# Patient Record
Sex: MALE | Race: WHITE | NOT HISPANIC OR LATINO | Employment: OTHER | URBAN - METROPOLITAN AREA
[De-identification: names, ages, dates, MRNs, and addresses within clinical notes are randomized per-mention and may not be internally consistent; named-entity substitution may affect disease eponyms.]

---

## 2018-05-14 ENCOUNTER — OFFICE VISIT (OUTPATIENT)
Dept: FAMILY MEDICINE | Facility: HOSPITAL | Age: 63
End: 2018-05-14
Attending: FAMILY MEDICINE
Payer: MEDICARE

## 2018-05-14 VITALS
BODY MASS INDEX: 27.34 KG/M2 | HEIGHT: 71 IN | WEIGHT: 195.31 LBS | HEART RATE: 80 BPM | DIASTOLIC BLOOD PRESSURE: 67 MMHG | SYSTOLIC BLOOD PRESSURE: 107 MMHG

## 2018-05-14 DIAGNOSIS — G47.33 OSA (OBSTRUCTIVE SLEEP APNEA): ICD-10-CM

## 2018-05-14 DIAGNOSIS — F32.A DEPRESSION, UNSPECIFIED DEPRESSION TYPE: Primary | Chronic | ICD-10-CM

## 2018-05-14 DIAGNOSIS — Z00.00 HEALTHCARE MAINTENANCE: ICD-10-CM

## 2018-05-14 PROCEDURE — 99214 OFFICE O/P EST MOD 30 MIN: CPT

## 2018-05-14 NOTE — PROGRESS NOTES
Subjective:       Patient ID: Nacho Gusman is a 62 y.o. male.    Chief Complaint: Snoring    HPI   61 yo M hx of HTN, depression, bipolar comes in for snoring. Reports seeing on TV that snoring can cause CVA and MI. Patient became concerned an came in for evaluation. Per charts, [facility]. Patient reports he snores by his mother, with episodes of stopping occasionally. Patient wants to have a sleep study for further evaluation. Also c/o having depression in the past and stopped taking lexapro on his own. He was scheduled to see Dr. Louise however never saw her. Would like to see our psychologist here in clinic.    Review of Systems   Constitutional: Negative for chills, fatigue, fever and unexpected weight change.   HENT: Negative for sore throat.    Eyes: Negative for visual disturbance.   Respiratory: Negative for cough, chest tightness and shortness of breath.    Cardiovascular: Negative for chest pain.   Gastrointestinal: Negative for abdominal pain, constipation, diarrhea, nausea and vomiting.   Endocrine: Negative for polyuria.   Genitourinary: Negative for dysuria and hematuria.   Neurological: Negative for headaches.       Objective:      Vitals:    05/14/18 1609   BP: 107/67   Pulse: 80     Physical Exam   Constitutional: He is oriented to person, place, and time. He appears well-developed and well-nourished. No distress.   HENT:   Head: Normocephalic and atraumatic.   Right Ear: External ear normal.   Left Ear: External ear normal.   Eyes: Conjunctivae and EOM are normal. Right eye exhibits no discharge. Left eye exhibits no discharge. No scleral icterus.   Neck: Normal range of motion.   Cardiovascular: Normal rate, regular rhythm and normal heart sounds.  Exam reveals no gallop and no friction rub.    No murmur heard.  Pulmonary/Chest: Effort normal and breath sounds normal. No respiratory distress.  He has no wheezes. He has no rales. He exhibits no tenderness.   Musculoskeletal: He exhibits no edema or tenderness.   Neurological: He is alert and oriented to person, place, and time.   Skin: Skin is warm. He is not diaphoretic.   Psychiatric: He has a normal mood and affect. His behavior is normal. Judgment and thought content normal.   Nursing note and vitals reviewed.      Assessment:       1. Depression, unspecified depression type    2. INDIA (obstructive sleep apnea)    3. Healthcare maintenance        Plan:       Depression, unspecified depression type  -     Ambulatory referral to Psychology    INDIA (obstructive sleep apnea)  -     Ambulatory consult to Sleep Disorders    Healthcare maintenance  -     CBC auto differential; Future; Expected date: 05/14/2018  -     Comprehensive metabolic panel; Future; Expected date: 05/14/2018  -     Vitamin D; Future; Expected date: 05/14/2018  -     Lipid panel; Future; Expected date: 05/14/2018  -     Urinalysis; Future; Expected date: 05/14/2018  -     TSH; Future; Expected date: 05/14/2018  -     HIV-1 and HIV-2 antibodies; Future; Expected date: 05/14/2018  -     RPR; Future; Expected date: 05/14/2018    - STOP BANG - high risk. Referral to Dr. Lundberg  - PHQ9 = 15. Denies SI/HI. GAD7 = 3. Does not want pharm tx at this point. Desires to see Dr. Bolanos for depression. [facility]  - Denies hx of seizures  - Hx of HTN previously on lisinopril however stopped on his own. BP controlled off all medications.  - [facility]  - Will need to discuss low dose chest CT, AAA screening, and colonoscopy next visit    Follow-up in about 1 month (around 6/14/2018), or if symptoms worsen or fail to improve.

## 2018-05-15 NOTE — PROGRESS NOTES
Case discussed with resident at time of visit.  Patient seen and evaluated by me.  I have reviewed and concur with the resident's history, physical exam, assessment, and plan.

## 2018-05-31 ENCOUNTER — OFFICE VISIT (OUTPATIENT)
Dept: FAMILY MEDICINE | Facility: HOSPITAL | Age: 63
End: 2018-05-31
Attending: FAMILY MEDICINE
Payer: MEDICARE

## 2018-05-31 DIAGNOSIS — F32.A DEPRESSION, UNSPECIFIED DEPRESSION TYPE: Chronic | ICD-10-CM

## 2018-05-31 PROCEDURE — 99211 OFF/OP EST MAY X REQ PHY/QHP: CPT | Performed by: PSYCHOLOGIST

## 2018-05-31 NOTE — PROGRESS NOTES
"[facility]   [address]  [place] [address]    [facility]   Initial Note    Provider introduced herself to Patient and explained her role in the clinic (e.g., behavioral health services).  Descriptions of the assessment process, the initial 50-55 minute diagnostic appointment, and 20-30 minute follow-up appointments were given. Patient was fully cooperative throughout the interview and was an adequate historian. Patient willingly signed a consent form for treatment and limits of confidentiality were explained in this context.    Note: All information described in this report has been directly reported by the patient in the appointment (unless specifically noted) except for Mental Status, Diagnosis, and Conclusions/Recommendations/Initial Treatment Goals.      Date of Service: 2018   Client Name:  Kyler Gusman  Preferred Name: KYLER  MRN: [id number]  : 1955  Age:  63 y.o.  CPT Code: 13476  Length of Session: 30 Minute  Present in Session: Patient  Provider: Hina Bolanos, PhD,     Referral Source:  Patient was referred by his PCP (Dwaine Varela MD) for an assessment of depression.    Chief Complaint(s): "I have emptiness."    Assessment of Chief Complaint(s):  Patient presented with concerns related to a depressed mood. He reported that he first noticed a change in his mood when his father  six years ago. Patient stated he was living with his father at that time, and he witnessed him have a heart attack. Patient reported he brothers moved him into a residential home after their father . Patient stated he was unsure as to why his brothers put him into a nursing home. Patient stated he is able to care for himself and live on his own. Patients endorsed depressive symptoms include the following: no interest in doing things, feeling depressed, feeling tired, feeling bad about self, and thoughts of death. Patient shared he often questions, "What's the " "point?" He denied having any current suicidal plans, means, or intent.     Past treatment for referral problem: He reported he has worked with a number of mental health providers (Angella Sood and [facility]) to address his mood in the past. He stated he did not find these providers to be helpful.    Psychotropic Medications: Denied.    Other Problems:  Patient also took time expressing his frustration with being single and lonely. He stated he is unable to find a woman to date because "all dating is done online now." He also stated he has had trouble approaching women in the past. For instance, he shared he was once "expelled" from a book store after talking to a woman. He didn't provide any additional details about this incident. In addition, at one point he asked if the clinic's referral coordinator (Danielle) was single.     Current Mental Health Symptoms:   Psychotic symptoms: Denied.   Suicidal Ideation: Patient denied any current suicidal ideation, plan, means, or intent.   Homicidal Ideation: Patient denied any current homicidal ideation, plan, means, or intent.    Self-harming behaviors: Client denied engaging in any current self-harming behaviors (e.g., cutting or burning).     Mental Health History:  Psychiatric History:  Psychiatric Hospitalizations: Endorsed.   Suicidal ideation/attempts: Endorsed. He reported he once thought about jumping off of a bridge in the 1980s. He reported he was taken to the hospital for one night.  Psychiatric Medications:Endorsed. Patient reported his mother experienced depression and anxiety.   Family Psychiatric History: Endorsed.    Functional Assessment/Typical Day:  Appetite: Patient denied changes in appetite.  Sleep: Patient endorsed averaging a total of 7-8 hours of sleep each night. Patient denied problems related to falling asleep, staying asleep, waking too early and sleeping too much.  Work:  Patient stated he is currently on disability for ulcer he had when he was 42 " "years old. Patient reported he was "forced" to retire at the age of 42 because this ulcer. He reported he worked as a  for a local [employer]. In addition, he reported he served in the [employer] for three years after the [place] war.   Physical Activity: Patient endorsed engaging in current physical activity.  He stated he works out at a gym five days a week.  Recreation Activities:  Patient endorsed engaging in enjoyable and meaningful activities. For instance, he stated he enjoys reading at the library.  Relationship    Status: Patient denied being in a partnered relationship.   Family: Patient denied changes in his family relationships. Patient denied having any children. He shared both of his parents are . He also shared he has one brother and one half brother.   Friendships: He shared he ended his friendship with a close friend because of "betrayal."  Sexual Health/Functioning: Patient endorsed concerns related to sexual health and functioning.  He reported difficulty gaining and maintaining erections.    Current Substance Use  Caffeine: Patient stated he currently drinks "four or five" cups of coffee a day.  Tobacco:  Patient denied any current tobacco use.   Alcohol:  Patient denied any current alcohol use.   Street Drugs:  Patient denied any current street drug use.     Intervention & Response:  Rapport building, assessment, and psychoeducation were utilized.  Patient was open to receiving psychoeducation about depression. Discussed psychotropic medications as a treatment option to talk to his PCP about at his next appointment. He reported he was not interested in any psychotropic medications at this time because he didn't want them to interact with his vitamins. Discussed behavioral activation and increasing enjoyable activities.     Handouts given:  Coping with Depression  Distract Yourself with Pleasurable Activities    Measures:  PHQ-9: 13: Moderate depression (10-14).  VILMA-7: 6: Mild anxiety " "(6-10).    Interactive Complexity:  None.    Mental Status Exam:  Appearance: Client wore dirty clothes and smelled like unwashed clothing.  Expressed Mood: Empty."  Affect: Flat Affect consistent with expressed mood.  Attitude during Interview: Candid and cooperative.  Movement and Behavior: No unusual movements or psychomotor changes.  Speech: Normal rate/tone/volume, without pressure  Eye Contact: Makes eye contact  Thought processes: Clear, coherent, and organized.  Thought Content: No indication of suicidal ideation, homicidal ideation, hallucinations, delusions, obsessions, ideas of reference, or symptoms of dissociation.  Orientation: Oriented x 4 (person, place, time, and situation)  Memory/concentration: WNL   Fair.    Diagnostic Impressions:  (296.31)(F33.0) Major Depressive Disorder, Recurrent, Mild      Treatment Recommendations:  On the basis of the above information, the following recommendations are made with respect to treatment:    1. Cognitive and behavioral interventions involving behavioral activation, cognitive disputation, and problem solving may be beneficial for addressing Patient's depressed mood.   2. Discuss psychotropic medication(s) with PCP in order to address symptoms of depression.    Need for Future Services:  Patient will return to the clinic in 4 weeks for an appointment with Provider.         Signature,     Hina Bolanos, PhD,   Licensed Psychologist          "

## 2018-06-18 ENCOUNTER — OFFICE VISIT (OUTPATIENT)
Dept: FAMILY MEDICINE | Facility: HOSPITAL | Age: 63
End: 2018-06-18
Attending: FAMILY MEDICINE
Payer: MEDICARE

## 2018-06-18 VITALS
SYSTOLIC BLOOD PRESSURE: 124 MMHG | WEIGHT: 195.56 LBS | DIASTOLIC BLOOD PRESSURE: 69 MMHG | HEART RATE: 86 BPM | BODY MASS INDEX: 27.38 KG/M2 | HEIGHT: 71 IN

## 2018-06-18 DIAGNOSIS — Z00.00 HEALTHCARE MAINTENANCE: ICD-10-CM

## 2018-06-18 DIAGNOSIS — G47.00 INSOMNIA, UNSPECIFIED TYPE: ICD-10-CM

## 2018-06-18 DIAGNOSIS — G47.33 OSA (OBSTRUCTIVE SLEEP APNEA): Primary | ICD-10-CM

## 2018-06-18 DIAGNOSIS — F32.A DEPRESSION, UNSPECIFIED DEPRESSION TYPE: ICD-10-CM

## 2018-06-18 PROCEDURE — 99213 OFFICE O/P EST LOW 20 MIN: CPT | Performed by: FAMILY MEDICINE

## 2018-06-18 NOTE — PROGRESS NOTES
"Subjective:       Patient ID: Nacho Gusman is a 63 y.o. male.    Chief Complaint: Snoring    HPI     64yo M presents with cc of snoring, stating "I heard that snoring increases your risk for having a stroke, and I've been snoring my whole life. Is that true?" Pt has had roommates who complain about his snoring. He also endorses being fatigued during the day. He denies feeling sleepy while driving or feeling as if he will spontaneously fall asleep throughout the day. He endorses that he wakes up feeling "well rested" and sleeps 7-8 hours. On further questioning, he describes getting into bed before sleep between 3-4am and waking up at noon, and experiencing "deep, restorative sleep around 9am."     Pt endorses insomnia, stating it takes 4-5 hours for him to fall asleep. His sleep routine includes eating a meal of oatmeal, a banana, and milk, taking vitamin C, melatonin, and thiamine supplements, and watching TV. Pt is interested in exploring "medicine that will cure" his snoring and insomnia.    Pt later stated "the real reason I'm here is for my depression." Pt denies SI/HI. Pt states "I have emptiness. Life feels trivial, unimportant, flat, boring. What is the point?" He explains his parents recently  and he does not know how to move on, that he feels like his "life is going no where," specifying that his "love life is going no where." Pt repeated these phrases several times. Pt stated that he is not sure if he wants to continue with Dr. Bolanos as his psychologist and may be interested in being referred to someone else. He says he will see her again to make a determination. Pt is not sure if he is interested in pharmacotherapy at this time because "I've been down that road before with Wellbutrin and Prozac."    Review of Systems   Constitutional: Positive for fatigue. Negative for activity change, appetite change, chills, diaphoresis, fever and unexpected weight change.   HENT: Negative for congestion, " rhinorrhea and sore throat.    Eyes: Negative for photophobia, pain and visual disturbance.   Respiratory: Negative for apnea, choking, chest tightness and shortness of breath.    Cardiovascular: Negative for chest pain, palpitations and leg swelling.   Gastrointestinal: Negative for abdominal pain, nausea and vomiting.   Genitourinary: Negative for dysuria, hematuria, penile pain, penile swelling, scrotal swelling and testicular pain.        Endorses erectile dysfunction   Musculoskeletal: Negative for arthralgias, back pain and myalgias.   Skin: Negative for color change, pallor, rash and wound.   Neurological: Negative for dizziness, tremors, seizures, syncope, facial asymmetry, speech difficulty, weakness, light-headedness, numbness and headaches.   Psychiatric/Behavioral: Positive for dysphoric mood and sleep disturbance (awakens several times to use the bathroom, or from feeling too hot or too cold). Negative for confusion, decreased concentration, hallucinations, self-injury and suicidal ideas.       Objective:      Vitals:    06/18/18 1535   BP: 124/69   Pulse: 86     Physical Exam   Constitutional: He is oriented to person, place, and time. He appears well-developed and well-nourished. No distress.   HENT:   Head: Normocephalic and atraumatic.   Eyes: Conjunctivae and EOM are normal. Pupils are equal, round, and reactive to light.   Neck: Normal range of motion. Neck supple.   Cardiovascular: Normal rate, regular rhythm and normal heart sounds.  Exam reveals no gallop and no friction rub.    No murmur heard.  Pulmonary/Chest: Effort normal and breath sounds normal. No respiratory distress.   Abdominal: Soft. Bowel sounds are normal. He exhibits no distension and no mass. There is no tenderness. There is no guarding.   Musculoskeletal: He exhibits no edema, tenderness or deformity.   Neurological: He is alert and oriented to person, place, and time. No cranial nerve deficit. Coordination normal.   Skin: Skin  is warm and dry. Capillary refill takes less than 2 seconds. No rash noted. He is not diaphoretic. No erythema. No pallor.   Psychiatric: His speech is normal and behavior is normal. Thought content normal. He is not actively hallucinating. Cognition and memory are normal. He is attentive.   Nursing note and vitals reviewed.      Assessment:       1. INDIA (obstructive sleep apnea)    2. Insomnia, unspecified type    3. Healthcare maintenance    4. Depression, unspecified depression type        Plan:       INDIA (obstructive sleep apnea)  -     Ambulatory consult to Sleep Disorders    Insomnia, unspecified type  -     Ambulatory consult to Sleep Disorders    Healthcare maintenance  -     CBC auto differential; Future; Expected date: 06/18/2018  -     Comprehensive metabolic panel; Future; Expected date: 06/18/2018        -     Hemaglobin A1c; Future; Expected date: 06/18/2018  -     Vitamin D; Future; Expected date: 06/18/2018  -     Lipid panel; Future; Expected date: 06/18/2018  -     Urinalysis; Future; Expected date: 06/18/2018  -     TSH; Future; Expected date: 06/18/2018  -     HIV-1 and HIV-2 antibodies; Future; Expected date: 06/18/2018  -     RPR; Future; Expected date: 06/18/2018    Depression, unspecified depression type        -     Continued psychology follow-up     - STOP BANG - moderate risk. Positive for snoring, daytime fatigue, age over 50yrs and male gender. Negative for witnessed choking/gasping by roommates, high blood pressure (PMH of HTN but now well controlled with diet and exercise), high BMI, or neck size of 17 in. Referral given to sleep specialist in [facility] network that accepts [employer] insurance.    As per last note, pt was asked about his interest in appropriate preventive screenings for AAA, low dose CT, and colonoscopy. Pt is interested in pursuing these. Upon resolution of snoring complaint, this should be revisited.    RV 1 month.

## 2018-06-28 ENCOUNTER — TELEPHONE (OUTPATIENT)
Dept: FAMILY MEDICINE | Facility: HOSPITAL | Age: 63
End: 2018-06-28

## 2018-07-19 ENCOUNTER — OFFICE VISIT (OUTPATIENT)
Dept: FAMILY MEDICINE | Facility: HOSPITAL | Age: 63
End: 2018-07-19
Attending: FAMILY MEDICINE
Payer: MEDICARE

## 2018-07-19 DIAGNOSIS — F32.A DEPRESSION, UNSPECIFIED DEPRESSION TYPE: Chronic | ICD-10-CM

## 2018-07-19 PROCEDURE — 99211 OFF/OP EST MAY X REQ PHY/QHP: CPT | Performed by: PSYCHOLOGIST

## 2018-07-19 NOTE — PROGRESS NOTES
"[facility]   [address]  [place] [address]     [facility]   Progress Note     Date of Service: 2018   Client Name:  Kyler Gusman  Preferred Name: KYLER  MRN: [id number]  : 1955  Age:  63 y.o.  CPT Code: 71815  Length of Session: 30 Minute  Present in Session: Patient  Provider: Hina Bolanos, PhD,      Referral Source/Chief Complaint:   Patient's PCP (Dwaine Varela MD) referred him for an assessment of depresson.      Subjective: "I have nothing in my life."     Objective:   Patient arrived on time to appointment. Patient was open to Providers feedback and interventions. In addition, Patient was engaged during the session. Patient's affect was flat. Patient denied any SI/HI. Patient again wore dirty clothes and smelled like unwashed clothing.     PHQ-9: 13 (item #9: 0) Patient left one item blank.  VILMA-7: 4     Handouts:  Thought Log  Thinking Errors    Interactive Complexity included:  The need to manage maladaptive communication (e.g., high anxiety, high reactivity, repeated questions, or disagreement) among participants that complicated the delivery of care.    Psychotropic Medication(s):   None.     Assessment:  Depression continuing. Patient started to increase his understanding of how thoughts, feeling, and behaviors relate to depression. Cognitive behavioral, psychoeducation, and supportive strategies were utilized.    Patient's mood was discussed. He continued to report feelings of emptiness and anhedonia. Patient's tendency to engage in negative thinking was acknowledged. Discussed benefits of cognitive disputation. Reviewed handouts on thinking errors and a thought log.     Discussed receiving [facility] mental health (e.g., [facility] group for depression[facility] Given Patient's  status, [facility] [place] [facility]. In addition, discussed increasing his social and peer support " "network. Patient expressed frustration with feeling [facility] Encouraged Patient to review a website for meeting new people (e.g., [employer])    Patient expressed dissatisfaction with his previous encounter with Provider. He stated he thought Provider was "inappropratie" when she asked about suicidal ideation. Provider explained standards of care and assessment of past as well as current suicidal ideation. Patient stated he understood why Provider assessed for suicide. Patient also stated he felt as though Provider was treating him like a "rapist" when he ask if the clinic's referral coordinator (Danielle) was single. In addition, he reported he felt Provider had "rushed" him out of the appointment. Explored and validated Patient's feelings. Patient reported he appreciated discussing his concerns, and he was interested in continuing to work with Provider.     Plan/Goals  1. [facility] about [facility]  2. Complete thought log  3. Research [employer]     Diagnoses:  (311) (F32.9) Unspecified Depressive Disorder    Future Services:  Return to clinic in 4 week(s).    Lucía,     Hina Bolanos, PhD,   Licensed Psychologist    "

## 2018-07-30 ENCOUNTER — OFFICE VISIT (OUTPATIENT)
Dept: FAMILY MEDICINE | Facility: HOSPITAL | Age: 63
End: 2018-07-30
Attending: FAMILY MEDICINE
Payer: MEDICARE

## 2018-07-30 VITALS
BODY MASS INDEX: 27.28 KG/M2 | SYSTOLIC BLOOD PRESSURE: 119 MMHG | WEIGHT: 194.88 LBS | DIASTOLIC BLOOD PRESSURE: 78 MMHG | HEIGHT: 71 IN | HEART RATE: 76 BPM

## 2018-07-30 DIAGNOSIS — I10 ESSENTIAL HYPERTENSION: Primary | ICD-10-CM

## 2018-07-30 PROCEDURE — 99213 OFFICE O/P EST LOW 20 MIN: CPT | Performed by: FAMILY MEDICINE

## 2018-08-02 ENCOUNTER — LAB VISIT (OUTPATIENT)
Dept: LAB | Facility: HOSPITAL | Age: 63
End: 2018-08-02
Payer: MEDICARE

## 2018-08-02 DIAGNOSIS — Z00.00 HEALTHCARE MAINTENANCE: ICD-10-CM

## 2018-08-02 LAB
25(OH)D3+25(OH)D2 SERPL-MCNC: 42 NG/ML
ALBUMIN SERPL BCP-MCNC: 3.6 G/DL
ALP SERPL-CCNC: 118 U/L
ALT SERPL W/O P-5'-P-CCNC: 13 U/L
ANION GAP SERPL CALC-SCNC: 6 MMOL/L
ANISOCYTOSIS BLD QL SMEAR: SLIGHT
AST SERPL-CCNC: 15 U/L
BASOPHILS # BLD AUTO: 0.02 K/UL
BASOPHILS NFR BLD: 0.3 %
BILIRUB SERPL-MCNC: 0.4 MG/DL
BUN SERPL-MCNC: 12 MG/DL
CALCIUM SERPL-MCNC: 9 MG/DL
CHLORIDE SERPL-SCNC: 107 MMOL/L
CHOLEST SERPL-MCNC: 142 MG/DL
CHOLEST/HDLC SERPL: 3 {RATIO}
CO2 SERPL-SCNC: 23 MMOL/L
CREAT SERPL-MCNC: 1.3 MG/DL
DIFFERENTIAL METHOD: ABNORMAL
EOSINOPHIL # BLD AUTO: 0.1 K/UL
EOSINOPHIL NFR BLD: 1.6 %
ERYTHROCYTE [DISTWIDTH] IN BLOOD BY AUTOMATED COUNT: 22 %
EST. GFR  (AFRICAN AMERICAN): >60 ML/MIN/1.73 M^2
EST. GFR  (NON AFRICAN AMERICAN): 59 ML/MIN/1.73 M^2
GLUCOSE SERPL-MCNC: 108 MG/DL
HCT VFR BLD AUTO: 34.5 %
HDLC SERPL-MCNC: 48 MG/DL
HDLC SERPL: 33.8 %
HGB BLD-MCNC: 10.2 G/DL
HYPOCHROMIA BLD QL SMEAR: ABNORMAL
LDLC SERPL CALC-MCNC: 72.2 MG/DL
LYMPHOCYTES # BLD AUTO: 1 K/UL
LYMPHOCYTES NFR BLD: 13.9 %
MCH RBC QN AUTO: 20.1 PG
MCHC RBC AUTO-ENTMCNC: 29.6 G/DL
MCV RBC AUTO: 68 FL
MONOCYTES # BLD AUTO: 0.8 K/UL
MONOCYTES NFR BLD: 11.4 %
NEUTROPHILS # BLD AUTO: 5 K/UL
NEUTROPHILS NFR BLD: 72.8 %
NONHDLC SERPL-MCNC: 94 MG/DL
OVALOCYTES BLD QL SMEAR: ABNORMAL
PLATELET # BLD AUTO: 303 K/UL
PLATELET BLD QL SMEAR: ABNORMAL
PMV BLD AUTO: 9.7 FL
POIKILOCYTOSIS BLD QL SMEAR: SLIGHT
POLYCHROMASIA BLD QL SMEAR: ABNORMAL
POTASSIUM SERPL-SCNC: 4.2 MMOL/L
PROT SERPL-MCNC: 6.4 G/DL
RBC # BLD AUTO: 5.07 M/UL
SODIUM SERPL-SCNC: 136 MMOL/L
TARGETS BLD QL SMEAR: ABNORMAL
TRIGL SERPL-MCNC: 109 MG/DL
TSH SERPL DL<=0.005 MIU/L-ACNC: 1.86 UIU/ML
WBC # BLD AUTO: 6.85 K/UL

## 2018-08-02 PROCEDURE — 80061 LIPID PANEL: CPT

## 2018-08-02 PROCEDURE — 36415 COLL VENOUS BLD VENIPUNCTURE: CPT

## 2018-08-02 PROCEDURE — 86592 SYPHILIS TEST NON-TREP QUAL: CPT

## 2018-08-02 PROCEDURE — 82306 VITAMIN D 25 HYDROXY: CPT

## 2018-08-02 PROCEDURE — 84443 ASSAY THYROID STIM HORMONE: CPT

## 2018-08-02 PROCEDURE — 85025 COMPLETE CBC W/AUTO DIFF WBC: CPT

## 2018-08-02 PROCEDURE — 86703 HIV-1/HIV-2 1 RESULT ANTBDY: CPT

## 2018-08-02 PROCEDURE — 80053 COMPREHEN METABOLIC PANEL: CPT

## 2018-08-03 LAB
HIV 1+2 AB+HIV1 P24 AG SERPL QL IA: NEGATIVE
RPR SER QL: NORMAL

## 2018-08-10 ENCOUNTER — OFFICE VISIT (OUTPATIENT)
Dept: SLEEP MEDICINE | Facility: CLINIC | Age: 63
End: 2018-08-10
Payer: MEDICARE

## 2018-08-10 VITALS
HEART RATE: 76 BPM | BODY MASS INDEX: 27.16 KG/M2 | DIASTOLIC BLOOD PRESSURE: 63 MMHG | HEIGHT: 71 IN | SYSTOLIC BLOOD PRESSURE: 115 MMHG | WEIGHT: 194 LBS

## 2018-08-10 DIAGNOSIS — G47.30 SLEEP APNEA, UNSPECIFIED TYPE: Primary | ICD-10-CM

## 2018-08-10 DIAGNOSIS — F51.09 SITUATIONAL INSOMNIA: ICD-10-CM

## 2018-08-10 PROCEDURE — 3078F DIAST BP <80 MM HG: CPT | Mod: CPTII,S$GLB,, | Performed by: NURSE PRACTITIONER

## 2018-08-10 PROCEDURE — 3074F SYST BP LT 130 MM HG: CPT | Mod: CPTII,S$GLB,, | Performed by: NURSE PRACTITIONER

## 2018-08-10 PROCEDURE — 99999 PR PBB SHADOW E&M-EST. PATIENT-LVL III: CPT | Mod: PBBFAC,,, | Performed by: NURSE PRACTITIONER

## 2018-08-10 PROCEDURE — 3008F BODY MASS INDEX DOCD: CPT | Mod: CPTII,S$GLB,, | Performed by: NURSE PRACTITIONER

## 2018-08-10 PROCEDURE — 99204 OFFICE O/P NEW MOD 45 MIN: CPT | Mod: S$GLB,,, | Performed by: NURSE PRACTITIONER

## 2018-08-10 RX ORDER — ZOLPIDEM TARTRATE 5 MG/1
5 TABLET ORAL NIGHTLY PRN
Qty: 2 TABLET | Refills: 0 | Status: SHIPPED | OUTPATIENT
Start: 2018-08-10 | End: 2023-05-15

## 2018-08-10 NOTE — PATIENT INSTRUCTIONS
Obstructive Sleep Apnea  Obstructive sleep apnea is a condition that causes your air passages to become narrowed or blocked during sleep. As a result, breathing stops for short periods. Your body wakes up enough for breathing to begin again, though you don't remember it. The cycle of stopped breathing and brief awakenings can repeat dozens of times a night. This prevents the body from getting to the deeper stages of sleep that are needed for good rest and may cause your body's oxygen level to fall.  Signs of sleep apnea include loud snoring, noisy breathing, and gasping sounds during sleep. Daytime symptoms include waking up tired after a full night's sleep, waking up with headaches, feeling very sleepy or falling asleep during the day, and having problems with memory or concentration.  Risk factors for sleep apnea include:  · Being overweight  · Being a man, or a woman in menopause  · Smoking  · Using alcohol or sedating medicines  · Having enlarged structures in the nose or throat  Home care  Lifestyle changes that can help treat snoring and sleep apnea include the following:  · If you are overweight, lose weight. Talk to your healthcare provider about a weight-loss plan for you.  · Avoid alcohol for 3 to 4 hours before bedtime. Avoid sedating medications. Ask your healthcare provider about the medicines you take.  · If you smoke, talk to your healthcare provider about ways to quit.  · Sleep on your side. This can help prevent gravity from pulling relaxed throat tissues into your breathing passages.  · If you have allergies or sinus problems that block your nose, ask your healthcare provider for help.  Follow-up care  Follow up with your healthcare provider, or as advised. A diagnosis of sleep apnea is made with a sleep study. Your healthcare provider can tell you more about this test.  When to seek medical advice  Sleep apnea can make you more likely to have certain health problems. These include high blood  pressure, heart attack, stroke, and sexual dysfunction. If you have sleep apnea, talk to your healthcare provider about the best treatments for you.  Date Last Reviewed: 4/1/2017  © 8543-3271 [email or web address]. [address] [place] [address], [place] [address]. All rights reserved. This information is not intended as a substitute for professional medical care. Always follow your healthcare professional's instructions.      Elaina or Manuel will contact you to schedule your sleep study. Their number is [phone] (ext 2). [facility] is located on 7th floor of the [facility].    We will call you when the sleep study results are ready - if you have not heard from us by 2 weeks from the date of the study, please call [phone] (ext 1).    You are advised to abstain from driving should you feel sleepy or drowsy.

## 2018-08-10 NOTE — PROGRESS NOTES
Nacho Gusman  was seen as a new patient, referred by Dr. Booth, for the evaluation of obstructive sleep apnea.     CHIEF COMPLAINT: Snoring    HISTORY OF PRESENT ILLNESS: Nacho Gusman a 63 y.o. male presents for the evaluation of obstructive sleep apnea. Remote witnessed apneic pauses. His mom is now . +snoring. Disrupted sleep 4-5x, easily returns to sleep. Denies am headaches or sinus congestion. Side sleeper mostly. Denies oral drying.     Denies symptoms of restless legs or kicking during sleep.   Melatonin 3mg     EPWORTH SLEEPINESS SCALE 8/10/2018   Sitting and reading 1   Watching TV 2   Sitting, inactive in a public place (e.g. a theatre or a meeting) 0   As a passenger in a car for an hour without a break 2   Lying down to rest in the afternoon when circumstances permit 3   Sitting and talking to someone 1   Sitting quietly after a lunch without alcohol 3   In a car, while stopped for a few minutes in traffic 0   Total score 12       Sleep Clinic New Patient 8/10/2018   What time do you go to bed on a week day? (Give a range) 1:30 to 4 am   What time do you go to bed on a day off? (Give a range) 1:30 to 4 am   How long does it take you to fall asleep? (Give a range) 30 minutes   On average, how many times per night do you wake up? 5   How long does it take you to fall back into sleep? (Give a range) A few minutes   What time do you wake up to start your day on a week day? (Give a range) 11:30 am   What time do you wake up to start your day on a day off? (Give a range) 11:30 am   What time do you get out of bed? (Give a range) 11:30 am   On average, how many hours do you sleep? 7   On average, how many naps do you take per day? 1 to 2   Rate your sleep quality from 0 to 5 (0-poor, 5-great). 4   Have you experienced:  N/a   How much weight have you lost or gained (in lbs.) in the last year? 0   On average, how many times per night do you go to the bathroom?  4 to 5   Have you ever had a sleep  "study/CPAP machine/surgery for sleep apnea? No   Have you ever had a CPAP machine for sleep apnea? No   Have you ever had surgery for sleep apnea? No     FAMILY HISTORY: No known sleep disorders.     SOCIAL HISTORY: Single. No tobacco or ETOH, retired    REVIEW OF SYSTEMS:  Sleep related symptoms as per HPI; + overweight  Sleep Clinic ROS  8/10/2018   Difficulty breathing through the nose?  No   Sore throat or dry mouth in the morning? No   Irregular or very fast heart beat?  No   Shortness of breath?  No   Acid reflux? No   Body aches and pains?  No   Morning headaches? No   Dizziness? No   Mood changes?  No   Do you exercise?  Yes   Do you feel like moving your legs a lot?  No     PHYSICAL EXAM:   /63   Pulse 76   Ht 5' 11" (1.803 m)   Wt 88 kg (194 lb)   BMI 27.06 kg/m²   GENERAL: W/D, W/N  body habitus, well groomed   HEENT: Conjunctivae are non-erythematous; Pupils equal, round, and reactive to light; Nose is symmetrical; Nasal mucosa is normal; Septum is midline; Inferior turbinates are normal; Nasal airflow is normal; Posterior pharynx is pink; Modified Mallampati: IV; Posterior palate is low; Tonsils not seen; Uvula is short;Tongue is high, broad; Dentition is fair; No TMJ tenderness; Jaw opening and protrusion without click and without discomfort.   NECK: Supple. Neck circumference is 16 inches. No thyromegaly. No palpable nodes.   SKIN: On face and neck: No abrasions, no rashes, no lesions. No subcutaneous nodules are palpable.   RESPIRATORY: Chest is clear to auscultation. Normal chest expansion and non-labored breathing at rest.   CARDIOVASCULAR: Normal S1, S2. No murmurs, gallops or rubs. No carotid bruits bilaterally.   EXTREMITIES: No edema. No clubbing. No cyanosis. Station normal. Gait normal.   NEURO/PSYCH: Oriented to time, place and person. Normal attention span and concentration. Affect is full. Mood is normal.       ASSESSMENT:     Unspecified Sleep Apnea, with symptoms of snoring, " remote witnessed apneic pauses, disrupted sleep and daytime sleepiness, with exam findings of a crowded oral airway.  Warrants further investigation for untreated sleep apnea.     PLAN:   1. Polysomnogram, discussed plan of care    2. Discussed etiology of INDIA and potential ramifications of untreated INDIA, including stroke, heart disease, HTN.  We discussed potential treatment options, which could include weight loss (10-15%), body positioning, continuous positive airway pressure (CPAP-definitive), mandibular advancement splint by dentist, or referral for surgical consideration.   3. The patient was advised to abstain from driving should he feel sleepy or drowsy.   4. I will see the patient back after the study has been completed to review test results and plan of care.     Thank you for allowing me the opportunity to participate in the care of your patient

## 2018-08-10 NOTE — LETTER
August 10, 2018      Henry Noble MD  [address]  [address]  [place] [address]           [facility]  [address] [address]  Phone: [phone]  Fax: [phone]          Patient: Nacho Gusman   MR Number: [id number]   YOB: 1955   Date of Visit: 8/10/2018       Dear Dr. Henry Noble:    Thank you for referring Nacho Gusman to me for evaluation. Attached you will find relevant portions of my assessment and plan of care.    If you have questions, please do not hesitate to call me. I look forward to following Nacho Gusman along with you.    Sincerely,    Sienna Dai, NP    Enclosure  CC:  No Recipients    If you would like to receive this communication electronically, please contact [email or web address] or [phone] to request more information on [employer] access.    For providers and/or their staff who would like to refer a patient to [facility], please contact us through our one-stop-shop provider referral line, [place] [place], at [phone].    If you feel you have received this communication in error or would no longer like to receive these types of communications, please e-mail [email or web address]

## 2018-08-16 ENCOUNTER — OFFICE VISIT (OUTPATIENT)
Dept: FAMILY MEDICINE | Facility: HOSPITAL | Age: 63
End: 2018-08-16
Attending: FAMILY MEDICINE
Payer: MEDICARE

## 2018-08-16 DIAGNOSIS — F32.A DEPRESSION, UNSPECIFIED DEPRESSION TYPE: Chronic | ICD-10-CM

## 2018-08-16 PROCEDURE — 99211 OFF/OP EST MAY X REQ PHY/QHP: CPT | Performed by: PSYCHOLOGIST

## 2018-08-16 NOTE — PROGRESS NOTES
"[facility]   [address]  [place] [address]     [facility]   Progress Note     Date of Service: 2018   Client Name:  Kyler Gusman  Preferred Name: KYLER  MRN: [id number]  : 1955  Age:  63 y.o.  CPT Code: 02140  Length of Session: 30 Minute  Present in Session: Patient  Provider: Hina Bolanos, PhD, LP     Referral Source/Chief Complaint:   Patient's PCP (Dwaine Varela MD) referred him for an assessment of depression.      Subjective: "I still have nothing to do."     Objective:   Patient arrived on time to appointment. Patient was open to ProviderS feedback and interventions. In addition, Patient was engaged during the session. Patient's affect was full range. Patient denied any SI/HI. Patient again wore dirty clothes and smelled like unwashed clothing.     PH-9: 10 (item #9: 0)  VILMA-7: 1     Handouts:  Disputing or Challenging Thoughts and Beliefs    Interactive Complexity included:  None.    Psychotropic Medication(S):   None.     Assessment:  Depression continuing. Patient worked to increase his understanding of how thoughts, feeling, and behaviors relate to depression. Cognitive behavioral, psycho education, and supportive strategies were utilized.    Patient's mood was discussed. He continued to report feeling he has nothing to look forward to in his life. Patient's tendency to engage in negative thinking was acknowledged again. Patient did not return with his homework of completing a thought log. Discussed benefits of cognitive disputation again. Reviewed a handout on disputing or challenging thoughts and beliefs.    Provider addressed Patient's lack of personal hygiene. Patient shared he showers once every two weeks, and he will wear his clothes two or three days in a row before changing them. Provided education on personal hygiene. Patient stated he didn't know he should shower and change his clothes more often (e.G., daily). Also discussed the " relationship between having good hygiene and increasing his social network.    Discussed increasing his engagement in enjoyable or meaningful activities. Encouraged Patient to look up local events and volunteering opportunities.     Plan/Goals  1. Take daily showers  2. Change clothing daily  3. Wash clothing more frequently  4. Complete thought log  5. Look into volunteering and going to free events     Diagnoses:  (311) (F32.9) Unspecified Depressive Disorder    Future Services:  Return to clinic in 4 week(S).    Signature,     Hina Bolanos, PhD, LP  Licensed Psychologist

## 2018-09-06 ENCOUNTER — OFFICE VISIT (OUTPATIENT)
Dept: FAMILY MEDICINE | Facility: HOSPITAL | Age: 63
End: 2018-09-06
Attending: FAMILY MEDICINE
Payer: MEDICARE

## 2018-09-06 DIAGNOSIS — F32.A DEPRESSION, UNSPECIFIED DEPRESSION TYPE: Chronic | ICD-10-CM

## 2018-09-06 PROCEDURE — 99211 OFF/OP EST MAY X REQ PHY/QHP: CPT | Performed by: PSYCHOLOGIST

## 2018-09-06 NOTE — PROGRESS NOTES
"[facility]   [address]  [place] [address]     [facility]   Progress Note     Date of Service: 2018   Client Name:  Kyler Gusman  Preferred Name: KYLER  MRN: [id number]  : 1955  Age:  63 y.o.  CPT Code: 33821  Length of Session: 30 Minute  Present in Session: Patient  Provider: Hina Bolanos, PhD, LP     Referral Source/Chief Complaint:   Patient's PCP (Dwaine Varela MD) referred him for an assessment of depression.      Subjective: "The world is passing me by."     Objective:   Patient arrived on time to appointment. Patient was open to Providers feedback and interventions. In addition, Patient was engaged during the session. Patient's affect was full range. Patient denied any SI/HI. Patient again wore dirty clothes and smelled like unwashed clothing.     PH-9: 7 (item #9: 0)  VILMA-7: 2     Handouts:  None.    Interactive Complexity included:  None.    Psychotropic Medication(S):   None.     Assessment:  Depression continuing. Patient worked to increase his understanding of how thoughts, feeling, and behaviors relate to depression. Cognitive behavioral, psycho education, and supportive strategies were utilized.    Patient's mood was discussed. He continued to report feeling he is "stuck" and has nothing in his life. He expressed frustration with not having a partner and online dating. Discussed focusing on himself before dating. Challenged Patient's negative thinking. Discussed his writing negative thoughts and feelings in a journal. Encouraged Patient to bring his journal to next appointment.     Discussed the possibility of beginning an antidepressant to manage Patient's mood. Patient was encouraged to further discuss this option with his PCP.     Provider addressed Patient's lack of personal hygiene again. Patient reported he has been making an effort to change and wash his clothes for often. Patient, however, arrived to his appointment with dirty clothes " and an odor.     Discussed increasing his engagement in enjoyable or meaningful activities again. Patient reported he would like to visit the [employer] in [place]. He expressed concern about missing a meal of his snf home. Discussed problem solving and asking to have a meal set a side for him.    Plan/Goals  1. Continue to take daily showers  2. Continue to change clothing daily  3. Continue to wash clothing more frequently  4. Journal and bring journal to next session  5. Schedule an appointment with PCP to discuss antidepressant medications  6. Continue to look for activities to participate in (e.g., [employer])     Diagnoses:  (311) (F32.9) Unspecified Depressive Disorder    Future Services:  Return to clinic in 4 week(S).    Lucía,     Hina Bolanos, PhD,   Licensed Psychologist

## 2018-09-14 ENCOUNTER — TELEPHONE (OUTPATIENT)
Dept: SLEEP MEDICINE | Facility: OTHER | Age: 63
End: 2018-09-14

## 2018-09-17 ENCOUNTER — OFFICE VISIT (OUTPATIENT)
Dept: FAMILY MEDICINE | Facility: HOSPITAL | Age: 63
End: 2018-09-17
Attending: FAMILY MEDICINE
Payer: MEDICARE

## 2018-09-17 VITALS
HEART RATE: 77 BPM | DIASTOLIC BLOOD PRESSURE: 74 MMHG | BODY MASS INDEX: 27.19 KG/M2 | WEIGHT: 194.25 LBS | HEIGHT: 71 IN | SYSTOLIC BLOOD PRESSURE: 121 MMHG

## 2018-09-17 DIAGNOSIS — F33.0 MILD EPISODE OF RECURRENT MAJOR DEPRESSIVE DISORDER: ICD-10-CM

## 2018-09-17 DIAGNOSIS — Z23 NEED FOR PROPHYLACTIC VACCINATION AND INOCULATION AGAINST INFLUENZA: Primary | ICD-10-CM

## 2018-09-17 PROCEDURE — 90686 IIV4 VACC NO PRSV 0.5 ML IM: CPT

## 2018-09-17 PROCEDURE — 99213 OFFICE O/P EST LOW 20 MIN: CPT | Mod: 25 | Performed by: FAMILY MEDICINE

## 2018-09-17 RX ORDER — SERTRALINE HYDROCHLORIDE 100 MG/1
TABLET, FILM COATED ORAL
Qty: 15 TABLET | Refills: 11 | Status: SHIPPED | OUTPATIENT
Start: 2018-09-17 | End: 2023-05-15

## 2018-09-17 NOTE — MEDICAL/APP STUDENT
CC: 'I want to talk about starting an antidepressant'    HPI: Mr. Gusman is a 64 YO male with a PMH remarkable for lifestyle controlled HTN who wants to discuss starting an antidepressant as per his psychologist.     Pt reports feeling depressed for 6 years since he witnessed his father's death from an MI. Pt has been seeing a psychologist routinely and was diagnosed with recurrent MDD, previously controlled with lifestyle changes, but would like to try a trial of pharmacotherapy. Pt also complains of decreased libido. The timing of this coincides with witnessing his father's death and the development of depression.      PMH: MDD    PSx: Pt had a major surgery to repair a perforated ulcer in  which prompted ETOH and smoking cessation.    Med/Alg: NKDA, Pt does not currently take any pharmacotherapy but takes many vitaminal supplements including: Vitamin C, Garlic, Inositol, Iron, Vit E, B12, Magnesium citrate, Fish Oil, CoQ10.     Fam hx: Father  of MI at 87, Mother  of unspecified cancer in late 80s.    Soc: Pt does 5 days of resistance training at a gym weekly, pt denies ELMIRA use but smoked 1-2 PPD for 23 years (34.5 pack yr hx).    ROS: Pt reports some difficulty sleeping but attributes this to 'an intense urge to drink V8 juice' Pt reports waking up 1x night to drink this, and then awakening to urinate later on. Pt denies polydypsia or polyurea during the day.    Pt also denies: fever, headache, chills, visual changes, auditory changes, suicidal ideation, homicidal ideation, dysphagia, shortness of breath, chest pain, abdominal pain, dysuria, difficulty defecating, numbness or tingling in extremities, heat or cold intolerance.    VS: /74 P 77    PE: Gen: AAOx4, pt was slightly unkempt with crumbs on his shirt and stains on his pants. NAD  HEENT: NCAT, EOMI, PERRLA, mmm  CV: NRR, no murmurs, rubs or gallops  Pulm: CTAB  Abd: +BS, NT, ND  Extr: nonedematous, nontender, +2 pulses x4, cap refill  <2.  Psych: Pt reports feeling down and willing to start antidepressant as per psychologist, mood congruent with affect but poor personal hygiene although after speaking with psychologist pt has been taking steps to improve personal care.    Lab/IMG: no new labs or images to report.    Assessment: Mr. Gusman is a 62 YO M who's MDD warrants a trial of pharmacotherapy per suggestion of his psychologist. His bizarre sleep habits are going to be investigated via sleep study scheduled in October.     P: 1: Depression: Will start course of 50 mg sertraline QD, f/u in clinic in 2 weeks.  2:Low libido: Likely 2/2 depression but will f/u when depression is under control to remove confounding factor.   3: HTN: previous note has HTN on problem list but pt reports being normotensive, continue lifestyle control and BP monitoring.   4: Difficulty sleeping: Sleep study scheduled for October at [facility] f/u in clinic afterwards.

## 2018-10-08 NOTE — TELEPHONE ENCOUNTER
Dr. Varela won't be in clinic this afternoon. Contacted patient to reschedule appointment he has scheduled with Dr. Varela. Appt. Rescheduled for 10/22/18. Patient also requesting refill of sertraline 100mg tablets. Informed him he has 11 refills left on that rx and he can call his pharmacy when he needs another refill. Verbalized understanding.

## 2018-10-25 ENCOUNTER — OFFICE VISIT (OUTPATIENT)
Dept: FAMILY MEDICINE | Facility: HOSPITAL | Age: 63
End: 2018-10-25
Attending: FAMILY MEDICINE
Payer: MEDICARE

## 2018-10-25 DIAGNOSIS — F32.A DEPRESSION, UNSPECIFIED DEPRESSION TYPE: Chronic | ICD-10-CM

## 2018-10-25 PROCEDURE — 99211 OFF/OP EST MAY X REQ PHY/QHP: CPT | Performed by: PSYCHOLOGIST

## 2018-10-25 NOTE — PROGRESS NOTES
"[facility]   [address]  [place] [address]     [facility]   Progress Note     Date of Service: 10/25/2018   Client Name:  Kyler Gusman  Preferred Name: KYLER  MRN: [id number]  : 1955  Age:  63 y.o.  CPT Code: 70244  Length of Session: 30 Minute  Present in Session: Patient  Provider: Hina Bolanos, PhD, LP     Referral Source/Chief Complaint:   Patient's PCP (Dwaine Varela MD) referred him for an assessment of depression.      Subjective: "I feel less empty."     Objective:   Patient arrived on time to appointment. Patient was open to Providers feedback and interventions. In addition, Patient was engaged during the session. Patient's affect was full range. Patient did not endorse any SI/HI. Patient again wore dirty clothes and smelled like unwashed clothing.     PHQ-9: 0 Patient accidentally took this form with him.   VILMA-7: 0 Patient accidentally took this form with him.      Handouts:  None.    Interactive Complexity included:  None.    Psychotropic Medication(s):  Patient reported his PCP prescribed him Lexapro on 18.      Assessment:  Depression improving. Cognitive behavioral, psycho education, and supportive strategies were utilized.    Patient's mood was assessed. He reported that, since he started taking the Lexapro the previous month, he has noticed an improvement in his mood, and he no longer feels empty. Patient, however, expressed concern about "moving onto a new life." He further explained that he will be moving into a condo by himself. Patient stated he's moving because the facility that he's currently living in is raising the rent. Patient was unable to answer questions about when he will be moving or how much his rent will be in his new location. Patient reported his half-brother (Dangelo) is his guardian and takes care of his finances. Patient arrived to appointment with a journal entry of his thoughts. Encouraged Patient to continue to " journal and challenge unhelpful thinking. Reviewed other behavioral health techniques to address depressed mood (e.g., physical activity).He shared he did not meet his goal of going to the [employer]. Again encouraged Patient to increase meaningful and enjoyable activities.     Patient again arrived to his appointment with soiled clothes and an odor. Provider addressed Patient's lack of personal hygiene again. Patient reported he showers daily and washes his own clothes.     Plan/Goals  1. Continue to take daily showers  2. Continue to change clothing daily  3. Continue to wash clothing more frequently  4. Continue to journal   6. Continue to look for activities to participate in (e.g., [employer])     Diagnoses:  (311) (F32.9) Unspecified Depressive Disorder    Future Services:  Return to clinic in 4 week(S).    Hina Castrejon, PhD,   Licensed Psychologist

## 2018-10-29 ENCOUNTER — OFFICE VISIT (OUTPATIENT)
Dept: FAMILY MEDICINE | Facility: HOSPITAL | Age: 63
End: 2018-10-29
Attending: FAMILY MEDICINE
Payer: MEDICARE

## 2018-10-29 VITALS
WEIGHT: 190.94 LBS | DIASTOLIC BLOOD PRESSURE: 76 MMHG | SYSTOLIC BLOOD PRESSURE: 120 MMHG | HEART RATE: 72 BPM | BODY MASS INDEX: 26.73 KG/M2 | HEIGHT: 71 IN

## 2018-10-29 DIAGNOSIS — I10 ESSENTIAL HYPERTENSION: Primary | ICD-10-CM

## 2018-10-29 PROCEDURE — 99213 OFFICE O/P EST LOW 20 MIN: CPT | Performed by: FAMILY MEDICINE

## 2018-10-29 NOTE — MEDICAL/APP STUDENT
"Subjective:       Patient ID: Nacho Gusman is a 63 y.o. male.    Chief Complaint: depression follow-up  Nacho Gusman is a 64 yo male with past medical history of depression who presents to the clinic for follow up of depression. He was seen in clinic 3 weeks ago and complained of "feeling empty" and depressed. He was prescribed sertraline 100 mg. He states he feels less anxious and less irritable with the medicine. Endorses sleeping better and having a good appetite. States that he still feels "empty" but contributes it to his current social situation. Denies crying spells, SI, and HI.       Depression Patient is not experiencing: nervousness/anxiety, palpitations, shortness of breath and suicidal ideas.      Review of Systems   Constitutional: Negative for appetite change, chills and fever.   HENT: Negative for congestion, rhinorrhea and sore throat.    Eyes: Negative for visual disturbance.   Respiratory: Negative for cough and shortness of breath.    Cardiovascular: Negative for chest pain, palpitations and leg swelling.   Gastrointestinal: Negative for abdominal distention, constipation, diarrhea and vomiting.   Genitourinary: Negative for difficulty urinating, dysuria and hematuria.   Musculoskeletal: Negative for back pain.   Skin: Negative for color change.   Neurological: Negative for dizziness and light-headedness.   Psychiatric/Behavioral: Positive for depression. Negative for dysphoric mood, hallucinations, self-injury, sleep disturbance and suicidal ideas. The patient is not nervous/anxious.        Objective:     /76   Pulse 72   Ht 5' 11" (1.803 m)   Wt 86.6 kg (190 lb 14.7 oz)   BMI 26.63 kg/m²     Physical Exam   Constitutional: He is oriented to person, place, and time. He appears well-developed and well-nourished. No distress.   HENT:   Head: Normocephalic and atraumatic.   Eyes: Conjunctivae and EOM are normal. Pupils are equal, round, and reactive to light.   Neck: Normal range of " motion. Neck supple. No thyromegaly present.   Cardiovascular: Normal rate, regular rhythm, normal heart sounds and intact distal pulses.   No murmur heard.  Pulmonary/Chest: Effort normal and breath sounds normal. No respiratory distress. He has no wheezes. He has no rales. He exhibits no tenderness.   Abdominal: Soft. Bowel sounds are normal. He exhibits no distension. There is no tenderness. There is no guarding.   Musculoskeletal: Normal range of motion. He exhibits no edema, tenderness or deformity.   Neurological: He is alert and oriented to person, place, and time.   Skin: Skin is warm and dry. He is not diaphoretic.   Psychiatric: He has a normal mood and affect. His speech is normal and behavior is normal. He expresses no homicidal and no suicidal ideation.   Nursing note and vitals reviewed.      Assessment:       No diagnosis found.    Plan:       Depression, recurrent   - continue sertraline 100 mg, states he takes half a pill per day. Continue current regimen.   - return to clinic in 3 weeks for reevaluation

## 2021-10-21 ENCOUNTER — TELEPHONE (OUTPATIENT)
Dept: OPTOMETRY | Facility: CLINIC | Age: 66
End: 2021-10-21

## 2022-04-24 ENCOUNTER — HOSPITAL ENCOUNTER (EMERGENCY)
Facility: HOSPITAL | Age: 67
Discharge: HOME | End: 2022-04-24
Attending: EMERGENCY MEDICINE
Payer: MEDICARE

## 2022-04-24 VITALS
RESPIRATION RATE: 18 BRPM | OXYGEN SATURATION: 100 % | DIASTOLIC BLOOD PRESSURE: 66 MMHG | TEMPERATURE: 99 F | SYSTOLIC BLOOD PRESSURE: 142 MMHG | HEART RATE: 62 BPM

## 2022-04-24 DIAGNOSIS — Z71.9 ENCOUNTER FOR EDUCATION: Primary | ICD-10-CM

## 2022-04-24 PROCEDURE — 99282 EMERGENCY DEPT VISIT SF MDM: CPT

## 2022-04-24 NOTE — ED PROVIDER NOTES
Encounter Date: 2022    SCRIBE #1 NOTE: I, Sergio Locke, am scribing for, and in the presence of, Patience Arriaga NP.       History     Chief Complaint   Patient presents with    Eye Problem     Pt states he needs assistance with putting drop in eyes for sx on eye on the , pt states he lives alone and needs someone to place eye drops in eyes several times a day      Patient is a 66-year-old male who has a past medical history of Depression, Gastric ulcer (), Hypertension, and Seizure disorder, presents to the ER with an evaluation of an eye problem. Patient states he has eye surgery at [facility] on Thursday (22), however the eye drops of ciprofloxacin and prolensa should be taken the day before surgery. He reports his other medications need to be taken after surgery. He states he needs assistance on how to put the eye drops in his eyes, so that he may do so on Wednesday (22). Patient denies any fever, rash, neck stiffness, chest pain, or shortness of breath. No other concerning symptoms at this time.       The history is provided by the patient and medical records.     Review of patient's allergies indicates:  No Known Allergies  Past Medical History:   Diagnosis Date    [facility]    Gastric ulcer     surgery in     Hypertension     Seizure disorder      Past Surgical History:   Procedure Laterality Date    stomach surgery      for perforated gastric ulcer     Family History   Problem Relation Age of Onset    Hypertension Father     Hypertension Brother      Social History     Tobacco Use    Smoking status: Former Smoker     Packs/day: 1.50     Years: 20.00     Pack years: 30.00     Quit date: 3/2/1997     Years since quittin.1   Substance Use Topics    Alcohol use: No    Drug use: No     Review of Systems   Constitutional: Negative for fever.   HENT: Negative for sore throat.    Respiratory: Negative for shortness of  breath.    Cardiovascular: Negative for chest pain.   Gastrointestinal: Negative for nausea.   Genitourinary: Negative for dysuria.   Musculoskeletal: Negative for back pain and neck stiffness.   Skin: Negative for rash.   Neurological: Negative for weakness.   Hematological: Does not bruise/bleed easily.   All other systems reviewed and are negative.      Physical Exam     Initial Vitals [04/24/22 1406]   BP Pulse Resp Temp SpO2   (!) 142/66 62 18 98.6 °F (37 °C) 100 %      MAP       --         Physical Exam    Nursing note and vitals reviewed.  Constitutional: He appears well-developed and well-nourished. He is not diaphoretic. No distress.   HENT:   Head: Normocephalic and atraumatic.   Mouth/Throat: Oropharynx is clear and moist.   Eyes: Conjunctivae are normal.   Neck: Neck supple.   Normal range of motion.  Cardiovascular: Normal rate and regular rhythm.   Pulmonary/Chest: No respiratory distress.   Musculoskeletal:      Cervical back: Normal range of motion and neck supple. No edema, erythema or rigidity. Normal range of motion.     Neurological: He is alert and oriented to person, place, and time.   Skin: Skin is warm and dry. Capillary refill takes less than 2 seconds. No rash noted. No pallor.   Psychiatric: He has a normal mood and affect.         ED Course   Procedures  Labs Reviewed - No data to display       Imaging Results    None          Medications - No data to display  Medical Decision Making:   Differential Diagnosis:   Acute glaucoma, open globe, ocular foreign body, retinal detachment, vitreous hemorrhage, endophthalmitis, traumatic injury, orbital fracture, corneal abrasion/ulcer, retinal vascular occlusion, optic neuritis, periorbital/orbital cellulitis, hyphema, hypopion, iritis, UV keratitis, subconjunctival hemorrhage, conjunctivitis, blepharitis, chalazion/hordeolum, benign vitreous floaters.            Scribe Attestation:   Scribe #1: I performed the above scribed service and the  documentation accurately describes the services I performed. I attest to the accuracy of the note.        ED Course as of 04/24/22 1453   Sun Apr 24, 2022   3508 Pt was educations per myself and nurse on how to instill eye drops. Pt verbalized understanding and is stable at this time for dc.  [DT]      ED Course User Index  [DT] Patience Arriaga NP           I, Patience Arriaga NP, personally performed the services described in this documentation.All medical record entries made by the scribe were at my direction and in my presence.I have reviewed the chart and agree that the record reflects my personal performance and is accurate and complete.   Clinical Impression:   Final diagnoses:  [Z71.9] Encounter for education - Concerning eye drops and how to use them. (Primary)          ED Disposition Condition    Discharge Stable        ED Prescriptions     None        Follow-up Information     Follow up With Specialties Details Why Contact Info        [facility]. Call the number on your paperwork if needed.           Patience Arriaga NP  04/24/22 1754

## 2022-04-24 NOTE — DISCHARGE INSTRUCTIONS

## 2022-04-24 NOTE — ED NOTES
Pt came in for issues regarding application of eye drops prior to scheduled procedure later this week. Per Pt eye clinic did not educate pt appropriately. RN at bedside with saline did demonstration and had pt preform return demonstration. All questions were answered to pt satisfaction and pt expressed understanding for all teaching that took place.

## 2023-05-15 ENCOUNTER — HOSPITAL ENCOUNTER (INPATIENT)
Facility: HOSPITAL | Age: 68
LOS: 3 days | Discharge: HOME | DRG: 812 | End: 2023-05-19
Attending: EMERGENCY MEDICINE | Admitting: INTERNAL MEDICINE
Payer: MEDICARE

## 2023-05-15 DIAGNOSIS — R19.5 OCCULT GI BLEEDING: ICD-10-CM

## 2023-05-15 DIAGNOSIS — D64.9 SYMPTOMATIC ANEMIA: Primary | ICD-10-CM

## 2023-05-15 DIAGNOSIS — R06.02 SHORTNESS OF BREATH: ICD-10-CM

## 2023-05-15 DIAGNOSIS — I50.9 CHF (CONGESTIVE HEART FAILURE): ICD-10-CM

## 2023-05-15 DIAGNOSIS — D64.9 ANEMIA: ICD-10-CM

## 2023-05-15 DIAGNOSIS — R07.9 CHEST PAIN: ICD-10-CM

## 2023-05-15 LAB
ABO + RH BLD: NORMAL
ALBUMIN SERPL BCP-MCNC: 3.4 G/DL (ref 3.5–5.2)
ALP SERPL-CCNC: 115 U/L (ref 55–135)
ALT SERPL W/O P-5'-P-CCNC: 9 U/L (ref 10–44)
ANION GAP SERPL CALC-SCNC: 10 MMOL/L (ref 8–16)
ANISOCYTOSIS BLD QL SMEAR: SLIGHT
AST SERPL-CCNC: 16 U/L (ref 10–40)
BASOPHILS # BLD AUTO: 0 K/UL (ref 0–0.2)
BASOPHILS NFR BLD: 0 % (ref 0–1.9)
BILIRUB SERPL-MCNC: 0.4 MG/DL (ref 0.1–1)
BILIRUB UR QL STRIP: NEGATIVE
BLD GP AB SCN CELLS X3 SERPL QL: NORMAL
BUN SERPL-MCNC: 18 MG/DL (ref 8–23)
CALCIUM SERPL-MCNC: 8.4 MG/DL (ref 8.7–10.5)
CHLORIDE SERPL-SCNC: 107 MMOL/L (ref 95–110)
CLARITY UR: CLEAR
CO2 SERPL-SCNC: 17 MMOL/L (ref 23–29)
COLOR UR: YELLOW
CREAT SERPL-MCNC: 1.2 MG/DL (ref 0.5–1.4)
DIFFERENTIAL METHOD: ABNORMAL
EOSINOPHIL # BLD AUTO: 0.3 K/UL (ref 0–0.5)
EOSINOPHIL NFR BLD: 5.4 % (ref 0–8)
ERYTHROCYTE [DISTWIDTH] IN BLOOD BY AUTOMATED COUNT: 22.4 % (ref 11.5–14.5)
EST. GFR  (NO RACE VARIABLE): >60 ML/MIN/1.73 M^2
GLUCOSE SERPL-MCNC: 92 MG/DL (ref 70–110)
GLUCOSE UR QL STRIP: NEGATIVE
HCT VFR BLD AUTO: 12.3 % (ref 40–54)
HGB BLD-MCNC: 3.2 G/DL (ref 14–18)
HGB UR QL STRIP: NEGATIVE
HYPOCHROMIA BLD QL SMEAR: ABNORMAL
IMM GRANULOCYTES # BLD AUTO: 0.02 K/UL (ref 0–0.04)
IMM GRANULOCYTES NFR BLD AUTO: 0.4 % (ref 0–0.5)
KETONES UR QL STRIP: NEGATIVE
LEUKOCYTE ESTERASE UR QL STRIP: NEGATIVE
LYMPHOCYTES # BLD AUTO: 0.9 K/UL (ref 1–4.8)
LYMPHOCYTES NFR BLD: 16.8 % (ref 18–48)
MCH RBC QN AUTO: 16.7 PG (ref 27–31)
MCHC RBC AUTO-ENTMCNC: 26 G/DL (ref 32–36)
MCV RBC AUTO: 64 FL (ref 82–98)
MONOCYTES # BLD AUTO: 0.6 K/UL (ref 0.3–1)
MONOCYTES NFR BLD: 10.9 % (ref 4–15)
NEUTROPHILS # BLD AUTO: 3.6 K/UL (ref 1.8–7.7)
NEUTROPHILS NFR BLD: 66.5 % (ref 38–73)
NITRITE UR QL STRIP: NEGATIVE
NRBC BLD-RTO: 0 /100 WBC
OVALOCYTES BLD QL SMEAR: ABNORMAL
PH UR STRIP: 6 [PH] (ref 5–8)
PLATELET # BLD AUTO: 188 K/UL (ref 150–450)
PLATELET BLD QL SMEAR: ABNORMAL
PMV BLD AUTO: 10.7 FL (ref 9.2–12.9)
POCT GLUCOSE: 117 MG/DL (ref 70–110)
POIKILOCYTOSIS BLD QL SMEAR: ABNORMAL
POTASSIUM SERPL-SCNC: 4.4 MMOL/L (ref 3.5–5.1)
PROT SERPL-MCNC: 5.9 G/DL (ref 6–8.4)
PROT UR QL STRIP: NEGATIVE
RBC # BLD AUTO: 1.92 M/UL (ref 4.6–6.2)
SODIUM SERPL-SCNC: 134 MMOL/L (ref 136–145)
SP GR UR STRIP: 1.01 (ref 1–1.03)
SPECIMEN OUTDATE: NORMAL
TARGETS BLD QL SMEAR: ABNORMAL
TROPONIN I SERPL DL<=0.01 NG/ML-MCNC: <0.006 NG/ML (ref 0–0.03)
URN SPEC COLLECT METH UR: NORMAL
UROBILINOGEN UR STRIP-ACNC: NEGATIVE EU/DL
WBC # BLD AUTO: 5.41 K/UL (ref 3.9–12.7)

## 2023-05-15 PROCEDURE — 84484 ASSAY OF TROPONIN QUANT: CPT | Performed by: EMERGENCY MEDICINE

## 2023-05-15 PROCEDURE — 81003 URINALYSIS AUTO W/O SCOPE: CPT | Performed by: NURSE PRACTITIONER

## 2023-05-15 PROCEDURE — 80053 COMPREHEN METABOLIC PANEL: CPT | Performed by: NURSE PRACTITIONER

## 2023-05-15 PROCEDURE — 82962 GLUCOSE BLOOD TEST: CPT

## 2023-05-15 PROCEDURE — P9016 RBC LEUKOCYTES REDUCED: HCPCS | Performed by: EMERGENCY MEDICINE

## 2023-05-15 PROCEDURE — 93010 ELECTROCARDIOGRAM REPORT: CPT | Mod: ,,, | Performed by: INTERNAL MEDICINE

## 2023-05-15 PROCEDURE — 85025 COMPLETE CBC W/AUTO DIFF WBC: CPT | Performed by: NURSE PRACTITIONER

## 2023-05-15 PROCEDURE — 36430 TRANSFUSION BLD/BLD COMPNT: CPT

## 2023-05-15 PROCEDURE — 86900 BLOOD TYPING SEROLOGIC ABO: CPT | Performed by: EMERGENCY MEDICINE

## 2023-05-15 PROCEDURE — 86920 COMPATIBILITY TEST SPIN: CPT | Performed by: EMERGENCY MEDICINE

## 2023-05-15 PROCEDURE — 99285 EMERGENCY DEPT VISIT HI MDM: CPT | Mod: 25

## 2023-05-15 PROCEDURE — 93005 ELECTROCARDIOGRAM TRACING: CPT

## 2023-05-15 PROCEDURE — 93010 EKG 12-LEAD: ICD-10-PCS | Mod: ,,, | Performed by: INTERNAL MEDICINE

## 2023-05-15 RX ORDER — VITAMIN E 268 MG
400 CAPSULE ORAL DAILY
COMMUNITY

## 2023-05-15 RX ORDER — UBIDECARENONE 30 MG
30 CAPSULE ORAL DAILY
COMMUNITY

## 2023-05-15 RX ORDER — ASCORBIC ACID 500 MG
500 TABLET ORAL DAILY
COMMUNITY

## 2023-05-15 RX ORDER — FOLIC ACID 0.4 MG
400 TABLET ORAL DAILY
COMMUNITY

## 2023-05-15 RX ORDER — LANOLIN ALCOHOL/MO/W.PET/CERES
400 CREAM (GRAM) TOPICAL DAILY
COMMUNITY

## 2023-05-15 RX ORDER — HYDROCODONE BITARTRATE AND ACETAMINOPHEN 500; 5 MG/1; MG/1
TABLET ORAL
Status: DISCONTINUED | OUTPATIENT
Start: 2023-05-15 | End: 2023-05-19 | Stop reason: HOSPADM

## 2023-05-15 NOTE — ED PROVIDER NOTES
Emergency Department Encounter  Provider Note  Encounter Date: 5/15/2023    Patient Name: Nacho Gusman  MRN: [id number]    History of Present Illness   67-year-old male presenting from eye Clinic for evaluation of uncontrolled diabetes.  Patient states that he has never been told he has diabetes before, went to see the eye doctor today who did an exam and told him that he had signs and symptoms of his blood sugar being out of control from his eye exam and was told to show up to the emergency department.  Patient states that he has been tired and short of breath recently, but attributed that to working out 4 days a week.      History of Present Illness:    Chief Complaint:   Chief Complaint   Patient presents with    General Illness     Pt referred to ED after went to eye MD and states he had DM damage to bilateral eyes, pt states he  does not  was dx with DM, CBG in triage- 100   Pt appears pale in color, denies any black stools, abd pain           The following PMH/PSH/SocHx/FamHx has been reviewed by myself:    Past Medical History:   Diagnosis Date    Depression     [facility]    Gastric ulcer     surgery in     Hypertension     Seizure disorder      Past Surgical History:   Procedure Laterality Date    stomach surgery      for perforated gastric ulcer     Social History     Tobacco Use    Smoking status: Former     Packs/day: 1.50     Years: 20.00     Pack years: 30.00     Types: Cigarettes     Quit date: 3/2/1997     Years since quittin.2   Substance Use Topics    Alcohol use: No    Drug use: No     Family History   Problem Relation Age of Onset    Hypertension Father     Hypertension Brother        Allergies reviewed:  Review of patient's allergies indicates:  No Known Allergies    Medications reviewed:  Medication List with Changes/Refills   Current Medications    ASCORBIC ACID, VITAMIN C, (VITAMIN C) 500 MG TABLET    Take 500 mg by mouth once daily.    CO-ENZYME Q-10 30 MG CAPSULE     Take 30 mg by mouth once daily.    FOLIC ACID (FOLVITE) 400 MCG TABLET    Take 400 mcg by mouth once daily.    MAGNESIUM OXIDE (MAG-OX) 400 MG (241.3 MG MAGNESIUM) TABLET    Take 400 mg by mouth once daily.    OMEGA 3-DHA-EPA-FISH OIL 1,200 (144-216) MG CAP    Take 1,200 mg by mouth 2 (two) times daily.    VITAMIN E 400 UNIT CAPSULE    Take 400 Units by mouth once daily.   Discontinued Medications    SERTRALINE (ZOLOFT) 100 MG TABLET    Take one half tablet po daily    ZOLPIDEM (AMBIEN) 5 MG TAB    Take 1 tablet (5 mg total) by mouth nightly as needed. Night of sleep study prn       ROS  Review of Systems:    Constitutional:  Negative for fever.   HENT:  Negative for sore throat.    Eyes:  Negative for redness.   Respiratory:  Positive for shortness of breath.    Cardiovascular:  Negative for chest pain.   Gastrointestinal:  Negative for nausea.   Genitourinary:  Negative for dysuria.   Musculoskeletal:  Negative for back pain.   Skin:  Negative for rash.   Neurological:  Negative for weakness.   Hematological:  Does not bruise/bleed easily.   Psychiatric/Behavioral:  The patient is not nervous/anxious.      Physical Exam   Physical Exam    Initial Vitals [05/15/23 1657]   BP Pulse Resp Temp SpO2   128/60 66 18 98.3 °F (36.8 °C) 100 %      MAP       --           Triage vital signs reviewed.    Constitutional: Well-nourished, well-developed. Not in acute distress.  Very pale.  HENT: Normocephalic, atraumatic. Moist mucous membranes.  Eyes: No conjunctival injection.  Very pale conjunctiva.  Resp: Normal respiratory effort, breathing unlabored.  Cardio: Regular rate and rhythm.  GI: Abdomen non-distended.   MSK: Extremities without any deformities noted. No lower extremity edema.  Skin: Warm and dry. No rashes or lesions noted.  Neuro: Awake and alert. Moves all extremities.    ED Course   Procedures    Medical Decision Making    History Acquisition     The history is provided by the patient.     Review of prior  external/non ED notes:  Family medicine note 2018 for depression.    Differential Diagnoses   Based on available information and initial assessment, the working Differential Diagnosis includes, but is not limited to:  PE, MI/ACS, pneumothorax, pericardial effusion/tamonade, pneumonia, lung abscess, pericarditis/myocarditis, pleural effusion, lung mass, CHF exacerbation, asthma exacerbation, COPD exacerbation, aspirated/ingested foreign body, airway obstruction, CO poisoning, anemia, metabolic derangement, allergy/atopy, influenza, viral URI, viral syndrome.  .    EKG   EKG ordered and independently reviewed by me:   Sinus bradycardia, rate 52, first-degree AV block, normal axis, no STEMI    Labs   Lab tests ordered and independently reviewed by me:    Labs Reviewed   CBC W/ AUTO DIFFERENTIAL - Abnormal; Notable for the following components:       Result Value    RBC 1.92 (*)     Hemoglobin 3.2 (*)     Hematocrit 12.3 (*)     MCV 64 (*)     MCH 16.7 (*)     MCHC 26.0 (*)     RDW 22.4 (*)     Lymph # 0.9 (*)     Lymph % 16.8 (*)     All other components within normal limits    Narrative:     H & H   critical result(s) called and verbal readback obtained from   Barb Bonilla RN by RAFY 05/15/2023 17:41   COMPREHENSIVE METABOLIC PANEL - Abnormal; Notable for the following components:    Sodium 134 (*)     CO2 17 (*)     Calcium 8.4 (*)     Total Protein 5.9 (*)     Albumin 3.4 (*)     ALT 9 (*)     All other components within normal limits   POCT GLUCOSE - Abnormal; Notable for the following components:    POCT Glucose 117 (*)     All other components within normal limits   URINALYSIS, REFLEX TO URINE CULTURE    Narrative:     Specimen Source->Urine   TROPONIN I   TYPE & SCREEN   POCT GLUCOSE MONITORING CONTINUOUS   PREPARE RBC SOFT         Imaging   Imaging ordered and independently reviewed by me:   Imaging Results              X-Ray Chest AP Portable (Final result)  Result time 05/15/23 19:20:04      Final result  by Gallo Gill MD (05/15/23 19:20:04)                   Impression:      No acute process.      Electronically signed by: Gallo Gill MD  Date:    05/15/2023  Time:    19:20               Narrative:    EXAMINATION:  XR CHEST AP PORTABLE    CLINICAL HISTORY:  Shortness of breath    TECHNIQUE:  Single frontal view of the chest was performed.    COMPARISON:  None    FINDINGS:  Monitoring EKG leads are present.  The trachea is unremarkable.  The cardiomediastinal silhouette is within normal limits.  The hemidiaphragms are unremarkable.  There is no evidence of free air beneath the hemidiaphragms.  There are no pleural effusions.  There is no evidence of a pneumothorax.  There is no evidence of pneumomediastinum.  No airspace opacity is present.  There are degenerative changes in the osseous structures.                                           Additional Consideration   Nacho Gusman  presents to the emergency Department today with elevation for diabetes.  Patient states that he is short of breath and fatigued easily.  Plan for labs, EKG.    Additional testing considered but not indicated during the course of this workup: further imaging and labwork, not indicated  Co-morbidities/chronic illness/exacerbation of chronic illness considered which impacted clinical decision making:  None  Procedures done in the ED or plan for the OR: No  Social determinants of care considered during development of treatment plan include: Decreased medical literacy  Discussion of management or test interpretation with external provider: Yes, describe:  Admitting hospitalist  DNR discussion: No    The patient's list of active medications and allergies as documented per RN staff has been reviewed.  Medications given in the ED and/or prescribed:   Medications   0.9%  NaCl infusion (for blood administration) (has no administration in time range)             ED Course as of 05/15/23 2027   Mon May 15, 2023   2004 CBC auto  differential(!!)  Marked anemia [CS]   2004 Troponin I [CS]   2004 Comprehensive metabolic panel(!) [CS]   2004 Urinalysis, Reflex to Urine Culture Urine, Clean Catch [CS]   2004 X-Ray Chest AP Portable  Patient with marked anemia, which could explain his shortness of breath, pallor, you.  Patient does not have any evidence of diabetes.  Consented for 4 units of blood.  Is not tachycardic nor hypotensive, suspect that this anemia is chronic.  Denies any dark stools.  Has never received a blood transfusion before.  Will admit the patient for symptomatic anemia. [CS]      ED Course User Index  [CS] Radha Henry MD       Explanation of disposition:  Admission  Critical Care:    I have personally provided 20 minutes of critical care time exclusive of time spent on separately billable procedures. Time includes review of laboratory data, radiology results, discussion with consultants, and monitoring for potential decompensation. Interventions were performed as documented above.      Clinical Impression:     1. Symptomatic anemia    2. Anemia    3. Shortness of breath       ED Disposition Condition    Observation Stable               Radha Henry MD  05/15/23 2005       Radha Henry MD  05/15/23 2027

## 2023-05-15 NOTE — FIRST PROVIDER EVALUATION
Emergency Department TeleTriage Encounter Note      CHIEF COMPLAINT    Chief Complaint   Patient presents with    General Illness     Pt referred to ED after went to eye MD and states he had DM damage to bilateral eyes, pt states he  does not  was dx with DM, CBG in triage- 100   Pt appears pale in color, denies any black stools, abd pain     VITAL SIGNS   Initial Vitals [05/15/23 1657]   BP Pulse Resp Temp SpO2   128/60 66 18 98.3 °F (36.8 °C) 100 %      MAP       --          ALLERGIES    Review of patient's allergies indicates:  No Known Allergies    PROVIDER TRIAGE NOTE  This is a teletriage evaluation of a 67 y.o. male presenting to the ED with c/o being sent to the ED by Optometry for Diabetic retinopathy.  Patient has no DM diagnosis and is taking no meds for diabetes.  Denies any symptoms.  Limited physical exam via telehealth: The patient is awake, alert, answering questions appropriately and is not in respiratory distress.  As the Teletriage provider, I performed an initial assessment and ordered appropriate labs and imaging studies, if any, to facilitate the patient's care once placed in the ED. Once a room is available, care and a full evaluation will be completed by an alternate ED provider.  Any additional orders and the final disposition will be determined by that provider.  All imaging and labs will not be followed-up by the Teletriage Team, including myself.      Patient pale appearing in triage.  Basic labs ordered.    ORDERS  Labs Reviewed - No data to display    ED Orders (720h ago, onward)      None              Virtual Visit Note: The provider triage portion of this emergency department evaluation and documentation was performed via [employer], a HIPAA-compliant telemedicine application, in concert with a tele-presenter in the room. A face to face patient evaluation with one of my colleagues will occur once the patient is placed in an emergency department room.      DISCLAIMER: This note was  prepared with [employer] voice recognition transcription software. Garbled syntax, mangled pronouns, and other bizarre constructions may be attributed to that software system.

## 2023-05-16 LAB
ALBUMIN SERPL BCP-MCNC: 3 G/DL (ref 3.5–5.2)
ALP SERPL-CCNC: 110 U/L (ref 55–135)
ALT SERPL W/O P-5'-P-CCNC: 10 U/L (ref 10–44)
ANION GAP SERPL CALC-SCNC: 7 MMOL/L (ref 8–16)
AORTIC ROOT ANNULUS: 2.59 CM
AORTIC VALVE CUSP SEPERATION: 2.02 CM
AST SERPL-CCNC: 25 U/L (ref 10–40)
AV INDEX (PROSTH): 0.89
AV MEAN GRADIENT: 5 MMHG
AV PEAK GRADIENT: 10 MMHG
AV VALVE AREA: 2.78 CM2
AV VELOCITY RATIO: 0.87
BASOPHILS # BLD AUTO: 0.02 K/UL (ref 0–0.2)
BASOPHILS # BLD AUTO: 0.05 K/UL (ref 0–0.2)
BASOPHILS NFR BLD: 0.6 % (ref 0–1.9)
BASOPHILS NFR BLD: 0.8 % (ref 0–1.9)
BILIRUB SERPL-MCNC: 1.1 MG/DL (ref 0.1–1)
BLD PROD TYP BPU: NORMAL
BLOOD UNIT EXPIRATION DATE: NORMAL
BLOOD UNIT TYPE CODE: 5100
BLOOD UNIT TYPE: NORMAL
BNP SERPL-MCNC: 1636 PG/ML (ref 0–99)
BUN SERPL-MCNC: 15 MG/DL (ref 8–23)
CALCIUM SERPL-MCNC: 8.1 MG/DL (ref 8.7–10.5)
CHLORIDE SERPL-SCNC: 112 MMOL/L (ref 95–110)
CO2 SERPL-SCNC: 18 MMOL/L (ref 23–29)
CODING SYSTEM: NORMAL
CREAT SERPL-MCNC: 1.1 MG/DL (ref 0.5–1.4)
CROSSMATCH INTERPRETATION: NORMAL
CV ECHO LV RWT: 0.33 CM
DIFFERENTIAL METHOD: ABNORMAL
DIFFERENTIAL METHOD: ABNORMAL
DISPENSE STATUS: NORMAL
DOP CALC AO PEAK VEL: 1.62 M/S
DOP CALC AO VTI: 40.9 CM
DOP CALC LVOT AREA: 3.1 CM2
DOP CALC LVOT DIAMETER: 2 CM
DOP CALC LVOT PEAK VEL: 1.41 M/S
DOP CALC LVOT STROKE VOLUME: 113.67 CM3
DOP CALC MV VTI: 51.8 CM
DOP CALCLVOT PEAK VEL VTI: 36.2 CM
E WAVE DECELERATION TIME: 208.83 MSEC
E/A RATIO: 1.47
E/E' RATIO: 7.83 M/S
ECHO LV POSTERIOR WALL: 0.87 CM (ref 0.6–1.1)
EJECTION FRACTION: 65 %
EOSINOPHIL # BLD AUTO: 0.3 K/UL (ref 0–0.5)
EOSINOPHIL # BLD AUTO: 0.5 K/UL (ref 0–0.5)
EOSINOPHIL NFR BLD: 8.6 % (ref 0–8)
EOSINOPHIL NFR BLD: 9.9 % (ref 0–8)
ERYTHROCYTE [DISTWIDTH] IN BLOOD BY AUTOMATED COUNT: 23.4 % (ref 11.5–14.5)
ERYTHROCYTE [DISTWIDTH] IN BLOOD BY AUTOMATED COUNT: 23.7 % (ref 11.5–14.5)
EST. GFR  (NO RACE VARIABLE): >60 ML/MIN/1.73 M^2
FERRITIN SERPL-MCNC: 5 NG/ML (ref 20–300)
FRACTIONAL SHORTENING: 36 % (ref 28–44)
GLUCOSE SERPL-MCNC: 98 MG/DL (ref 70–110)
HCT VFR BLD AUTO: 16.6 % (ref 40–54)
HCT VFR BLD AUTO: 19.8 % (ref 40–54)
HCT VFR BLD AUTO: 29.5 % (ref 40–54)
HGB BLD-MCNC: 4.6 G/DL (ref 14–18)
HGB BLD-MCNC: 5.9 G/DL (ref 14–18)
HGB BLD-MCNC: 8.8 G/DL (ref 14–18)
IMM GRANULOCYTES # BLD AUTO: 0.02 K/UL (ref 0–0.04)
IMM GRANULOCYTES # BLD AUTO: 0.02 K/UL (ref 0–0.04)
IMM GRANULOCYTES NFR BLD AUTO: 0.3 % (ref 0–0.5)
IMM GRANULOCYTES NFR BLD AUTO: 0.6 % (ref 0–0.5)
INTERVENTRICULAR SEPTUM: 0.9 CM (ref 0.6–1.1)
IRON SERPL-MCNC: 116 UG/DL (ref 45–160)
IVRT: 79.92 MSEC
LA MAJOR: 5.89 CM
LA MINOR: 6.51 CM
LA WIDTH: 4.2 CM
LEFT ATRIUM SIZE: 4.15 CM
LEFT ATRIUM VOLUME MOD: 70.31 CM3
LEFT ATRIUM VOLUME: 91.63 CM3
LEFT INTERNAL DIMENSION IN SYSTOLE: 3.41 CM (ref 2.1–4)
LEFT VENTRICLE DIASTOLIC VOLUME: 138.07 ML
LEFT VENTRICLE SYSTOLIC VOLUME: 47.67 ML
LEFT VENTRICULAR INTERNAL DIMENSION IN DIASTOLE: 5.35 CM (ref 3.5–6)
LEFT VENTRICULAR MASS: 173.5 G
LV LATERAL E/E' RATIO: 7.83 M/S
LV SEPTAL E/E' RATIO: 7.83 M/S
LVOT MG: 3.9 MMHG
LVOT MV: 0.91 CM/S
LYMPHOCYTES # BLD AUTO: 0.5 K/UL (ref 1–4.8)
LYMPHOCYTES # BLD AUTO: 1 K/UL (ref 1–4.8)
LYMPHOCYTES NFR BLD: 14.2 % (ref 18–48)
LYMPHOCYTES NFR BLD: 15.9 % (ref 18–48)
MCH RBC QN AUTO: 21.5 PG (ref 27–31)
MCH RBC QN AUTO: 22 PG (ref 27–31)
MCHC RBC AUTO-ENTMCNC: 29.8 G/DL (ref 32–36)
MCHC RBC AUTO-ENTMCNC: 29.8 G/DL (ref 32–36)
MCV RBC AUTO: 72 FL (ref 82–98)
MCV RBC AUTO: 74 FL (ref 82–98)
MONOCYTES # BLD AUTO: 0.3 K/UL (ref 0.3–1)
MONOCYTES # BLD AUTO: 0.6 K/UL (ref 0.3–1)
MONOCYTES NFR BLD: 10.6 % (ref 4–15)
MONOCYTES NFR BLD: 8.4 % (ref 4–15)
MV MEAN GRADIENT: 1 MMHG
MV PEAK A VEL: 0.64 M/S
MV PEAK E VEL: 0.94 M/S
MV PEAK GRADIENT: 4 MMHG
MV STENOSIS PRESSURE HALF TIME: 60.56 MS
MV VALVE AREA BY CONTINUITY EQUATION: 2.19 CM2
MV VALVE AREA P 1/2 METHOD: 3.63 CM2
NEUTROPHILS # BLD AUTO: 2.3 K/UL (ref 1.8–7.7)
NEUTROPHILS # BLD AUTO: 3.8 K/UL (ref 1.8–7.7)
NEUTROPHILS NFR BLD: 63.8 % (ref 38–73)
NEUTROPHILS NFR BLD: 66.3 % (ref 38–73)
NRBC BLD-RTO: 0 /100 WBC
NRBC BLD-RTO: 1 /100 WBC
NUM UNITS TRANS PACKED RBC: NORMAL
OB PNL STL: NEGATIVE
OHS LV EJECTION FRACTION SIMPSONS BIPLANE MOD: 6 %
PISA TR MAX VEL: 3.22 M/S
PLATELET # BLD AUTO: 136 K/UL (ref 150–450)
PLATELET # BLD AUTO: 163 K/UL (ref 150–450)
PMV BLD AUTO: 9.7 FL (ref 9.2–12.9)
PMV BLD AUTO: ABNORMAL FL (ref 9.2–12.9)
POTASSIUM SERPL-SCNC: 4.6 MMOL/L (ref 3.5–5.1)
PROT SERPL-MCNC: 5.4 G/DL (ref 6–8.4)
PULM VEIN S/D RATIO: 1.29
PV MV: 0.69 M/S
PV PEAK D VEL: 0.51 M/S
PV PEAK S VEL: 0.66 M/S
PV PEAK VELOCITY: 1.04 CM/S
RA MAJOR: 6.4 CM
RA PRESSURE: 3 MMHG
RBC # BLD AUTO: 2.74 M/UL (ref 4.6–6.2)
RBC # BLD AUTO: 4 M/UL (ref 4.6–6.2)
RIGHT VENTRICULAR END-DIASTOLIC DIMENSION: 2.81 CM
SATURATED IRON: 24 % (ref 20–50)
SODIUM SERPL-SCNC: 137 MMOL/L (ref 136–145)
TDI LATERAL: 0.12 M/S
TDI SEPTAL: 0.12 M/S
TDI: 0.12 M/S
TOTAL IRON BINDING CAPACITY: 485 UG/DL (ref 250–450)
TR MAX PG: 41 MMHG
TRANSFERRIN SERPL-MCNC: 328 MG/DL (ref 200–375)
TV REST PULMONARY ARTERY PRESSURE: 44 MMHG
WBC # BLD AUTO: 3.45 K/UL (ref 3.9–12.7)
WBC # BLD AUTO: 5.96 K/UL (ref 3.9–12.7)

## 2023-05-16 PROCEDURE — 25000003 PHARM REV CODE 250: Performed by: INTERNAL MEDICINE

## 2023-05-16 PROCEDURE — C9113 INJ PANTOPRAZOLE SODIUM, VIA: HCPCS | Performed by: HOSPITALIST

## 2023-05-16 PROCEDURE — 36415 COLL VENOUS BLD VENIPUNCTURE: CPT | Performed by: HOSPITALIST

## 2023-05-16 PROCEDURE — 83880 ASSAY OF NATRIURETIC PEPTIDE: CPT | Performed by: HOSPITALIST

## 2023-05-16 PROCEDURE — 63600175 PHARM REV CODE 636 W HCPCS: Performed by: HOSPITALIST

## 2023-05-16 PROCEDURE — 85025 COMPLETE CBC W/AUTO DIFF WBC: CPT | Performed by: HOSPITALIST

## 2023-05-16 PROCEDURE — 25000003 PHARM REV CODE 250: Performed by: HOSPITALIST

## 2023-05-16 PROCEDURE — 82272 OCCULT BLD FECES 1-3 TESTS: CPT | Performed by: INTERNAL MEDICINE

## 2023-05-16 PROCEDURE — 85018 HEMOGLOBIN: CPT | Performed by: INTERNAL MEDICINE

## 2023-05-16 PROCEDURE — 84466 ASSAY OF TRANSFERRIN: CPT | Performed by: INTERNAL MEDICINE

## 2023-05-16 PROCEDURE — 85014 HEMATOCRIT: CPT | Performed by: INTERNAL MEDICINE

## 2023-05-16 PROCEDURE — 80053 COMPREHEN METABOLIC PANEL: CPT | Performed by: INTERNAL MEDICINE

## 2023-05-16 PROCEDURE — 99223 1ST HOSP IP/OBS HIGH 75: CPT | Mod: GC,,, | Performed by: INTERNAL MEDICINE

## 2023-05-16 PROCEDURE — 99223 PR INITIAL HOSPITAL CARE,LEVL III: ICD-10-PCS | Mod: GC,,, | Performed by: INTERNAL MEDICINE

## 2023-05-16 PROCEDURE — 11000001 HC ACUTE MED/SURG PRIVATE ROOM

## 2023-05-16 PROCEDURE — P9016 RBC LEUKOCYTES REDUCED: HCPCS | Performed by: EMERGENCY MEDICINE

## 2023-05-16 PROCEDURE — 82728 ASSAY OF FERRITIN: CPT | Performed by: INTERNAL MEDICINE

## 2023-05-16 PROCEDURE — 25000003 PHARM REV CODE 250: Performed by: STUDENT IN AN ORGANIZED HEALTH CARE EDUCATION/TRAINING PROGRAM

## 2023-05-16 RX ORDER — SODIUM CHLORIDE 0.9 % (FLUSH) 0.9 %
10 SYRINGE (ML) INJECTION EVERY 12 HOURS PRN
Status: DISCONTINUED | OUTPATIENT
Start: 2023-05-16 | End: 2023-05-19 | Stop reason: HOSPADM

## 2023-05-16 RX ORDER — NALOXONE HCL 0.4 MG/ML
0.02 VIAL (ML) INJECTION
Status: DISCONTINUED | OUTPATIENT
Start: 2023-05-16 | End: 2023-05-19 | Stop reason: HOSPADM

## 2023-05-16 RX ORDER — POLYETHYLENE GLYCOL 3350, SODIUM SULFATE ANHYDROUS, SODIUM BICARBONATE, SODIUM CHLORIDE, POTASSIUM CHLORIDE 236; 22.74; 6.74; 5.86; 2.97 G/4L; G/4L; G/4L; G/4L; G/4L
4000 POWDER, FOR SOLUTION ORAL ONCE
Status: COMPLETED | OUTPATIENT
Start: 2023-05-16 | End: 2023-05-16

## 2023-05-16 RX ORDER — FUROSEMIDE 10 MG/ML
40 INJECTION INTRAMUSCULAR; INTRAVENOUS ONCE
Status: DISCONTINUED | OUTPATIENT
Start: 2023-05-16 | End: 2023-05-16

## 2023-05-16 RX ORDER — GLUCAGON 1 MG
1 KIT INJECTION
Status: DISCONTINUED | OUTPATIENT
Start: 2023-05-16 | End: 2023-05-19 | Stop reason: HOSPADM

## 2023-05-16 RX ORDER — DEXTROSE 40 %
15 GEL (GRAM) ORAL
Status: DISCONTINUED | OUTPATIENT
Start: 2023-05-16 | End: 2023-05-19 | Stop reason: HOSPADM

## 2023-05-16 RX ORDER — PANTOPRAZOLE SODIUM 40 MG/10ML
40 INJECTION, POWDER, LYOPHILIZED, FOR SOLUTION INTRAVENOUS 2 TIMES DAILY
Status: DISCONTINUED | OUTPATIENT
Start: 2023-05-16 | End: 2023-05-19 | Stop reason: HOSPADM

## 2023-05-16 RX ORDER — GUAIFENESIN/DEXTROMETHORPHAN 100-10MG/5
5 SYRUP ORAL EVERY 4 HOURS PRN
Status: DISCONTINUED | OUTPATIENT
Start: 2023-05-16 | End: 2023-05-19 | Stop reason: HOSPADM

## 2023-05-16 RX ORDER — DEXTROSE 40 %
30 GEL (GRAM) ORAL
Status: DISCONTINUED | OUTPATIENT
Start: 2023-05-16 | End: 2023-05-19 | Stop reason: HOSPADM

## 2023-05-16 RX ORDER — FUROSEMIDE 10 MG/ML
40 INJECTION INTRAMUSCULAR; INTRAVENOUS ONCE
Status: COMPLETED | OUTPATIENT
Start: 2023-05-16 | End: 2023-05-16

## 2023-05-16 RX ADMIN — FUROSEMIDE 40 MG: 10 INJECTION, SOLUTION INTRAMUSCULAR; INTRAVENOUS at 05:05

## 2023-05-16 RX ADMIN — PANTOPRAZOLE SODIUM 40 MG: 40 INJECTION, POWDER, LYOPHILIZED, FOR SOLUTION INTRAVENOUS at 12:05

## 2023-05-16 RX ADMIN — GUAIFENESIN AND DEXTROMETHORPHAN 5 ML: 100; 10 SYRUP ORAL at 04:05

## 2023-05-16 RX ADMIN — IRON SUCROSE 200 MG: 20 INJECTION, SOLUTION INTRAVENOUS at 09:05

## 2023-05-16 RX ADMIN — PANTOPRAZOLE SODIUM 40 MG: 40 INJECTION, POWDER, LYOPHILIZED, FOR SOLUTION INTRAVENOUS at 08:05

## 2023-05-16 RX ADMIN — POLYETHYLENE GLYCOL 3350, SODIUM SULFATE ANHYDROUS, SODIUM BICARBONATE, SODIUM CHLORIDE, POTASSIUM CHLORIDE 4000 ML: 236; 22.74; 6.74; 5.86; 2.97 POWDER, FOR SOLUTION ORAL at 08:05

## 2023-05-16 NOTE — PLAN OF CARE
[facility] - Emergency Dept  Initial Discharge Assessment       Primary Care Provider: Esmer Licona MD    Admission Diagnosis: Symptomatic anemia [D64.9]    Admission Date: 5/15/2023  Expected Discharge Date: SW spoke with pt at bedside. Pt states he lives by himself his brother is the emergency contact now. SW changed info in chart. Pt drove himself here, prefers to drive himself home at discharge if possible. Pt has a new establish care PCP appointment. SW expressed importance of keeping the appointment with pt, he is agreeable.Pt lives independently, performing all ADLs with ease. No DME or HH needs noted. CM provided name and contact info on dry erase board and encouraged him to reach out for any needs.      Transition of Care Barriers: (P) None    Payor: [employer] MEDICARE / Plan: [employer] SECURE COMPLETE / Product Type: Medicare Advantage /     Extended Emergency Contact Information  Primary Emergency Contact: Dangelo Elder  Address: [address] [place], [place] [address]           [place] [address] [facility]  Home Phone: [phone]  Mobile Phone: [phone]  Relation: Brother    Discharge Plan A: (P) Home  Discharge Plan B: (P) Home with family      [facility]  [place] - [address]  [address]  [place] [address]  Phone: [phone] Fax: [phone]      Initial Assessment (most recent)       Adult Discharge Assessment - 05/16/23 1508          Discharge Assessment    Assessment Type Discharge Planning Assessment     Confirmed/corrected address, phone number and insurance Yes     Source of Information patient     People in Home alone (P)      Do you expect to return to your current living situation? Yes (P)      Do you have help at home or someone to help you manage your care at home? No (P)      Prior to hospitilization cognitive status: Alert/Oriented (P)      Current cognitive status: Alert/Oriented (P)      Home Layout Able to live on 1st floor (P)       Equipment Currently Used at Home none (P)      Patient currently being followed by outpatient case management? Unable to determine (comments) (P)      Do you currently have service(s) that help you manage your care at home? No (P)      Do you take prescription medications? Yes (P)      Do you have prescription coverage? Yes (P)      Coverage [employer] Medicare (P)      Do you have any problems affording any of your prescribed medications? TBD (P)      Is the patient taking medications as prescribed? yes (P)      Who is going to help you get home at discharge? Pt drives self (P)      How do you get to doctors appointments? car, drives self (P)      Discharge Plan A Home (P)      Discharge Plan B Home with family (P)      DME Needed Upon Discharge  none (P)      Discharge Plan discussed with: Patient (P)      Transition of Care Barriers None (P)                         Future Appointments   Date Time Provider Department Center   6/5/2023  1:30 PM Campos Saha DO [facility]  [place]  [phone]

## 2023-05-16 NOTE — H&P
[facility] Emergency [facility] Medicine  History & Physical    Patient Name: Nacho Gusman  MRN: [id number]  Patient Class: IP- Inpatient  Admission Date: 5/15/2023  Attending Physician: Mj Shields, *   Primary Care Provider: Esmer Licona MD         Patient information was obtained from patient and ER records.     Subjective:     Principal Problem:Microcytic anemia    Chief Complaint:   Chief Complaint   Patient presents with    General Illness     Pt referred to ED after went to eye MD and states he had DM damage to bilateral eyes, pt states he  does not  was dx with DM, CBG in triage- 100   Pt appears pale in color, denies any black stools, abd pain        HPI: Very pleasant 66 y/o M with no significant past medical hx, not on prescription medication presents due to lethargy , pallor, dyspnea and light headedness for 2 days. HB 3 mg/dl in ER. Patient denied chest pain bleeding from any orifice, not on anticoagulation no GI work up previously or hx of hematologic disorder      Past Medical History:   Diagnosis Date    [facility]    Gastric ulcer 1997    surgery in 1997    Hypertension     Seizure disorder        Past Surgical History:   Procedure Laterality Date    stomach surgery  1997    for perforated gastric ulcer       Review of patient's allergies indicates:  No Known Allergies    No current facility-administered medications on file prior to encounter.     Current Outpatient Medications on File Prior to Encounter   Medication Sig    ascorbic acid, vitamin C, (VITAMIN C) 500 MG tablet Take 500 mg by mouth once daily.    co-enzyme Q-10 30 mg capsule Take 30 mg by mouth once daily.    folic acid (FOLVITE) 400 MCG tablet Take 400 mcg by mouth once daily.    magnesium oxide (MAG-OX) 400 mg (241.3 mg magnesium) tablet Take 400 mg by mouth once daily.    omega 3-dha-epa-fish oil 1,200 (144-216) mg Cap Take 1,200 mg by mouth 2 (two) times daily.    vitamin E 400  UNIT capsule Take 400 Units by mouth once daily.     Family History       Problem Relation (Age of Onset)    Hypertension Father, Brother          Tobacco Use    Smoking status: Former     Packs/day: 1.50     Years: 20.00     Pack years: 30.00     Types: Cigarettes     Quit date: 3/2/1997     Years since quittin.2    Smokeless tobacco: Not on file   Substance and Sexual Activity    Alcohol use: No    Drug use: No    Sexual activity: Not on file     Review of Systems   Constitutional:  Positive for activity change, appetite change, chills, fatigue and unexpected weight change.   HENT: Negative.     Eyes: Negative.    Respiratory:  Positive for shortness of breath.    Cardiovascular: Negative.    Genitourinary: Negative.    Musculoskeletal: Negative.    Neurological: Negative.    Hematological: Negative.    Psychiatric/Behavioral: Negative.     Objective:     Vital Signs (Most Recent):  Temp: 98 °F (36.7 °C) (23 0010)  Pulse: (!) 48 (23 0010)  Resp: (!) 24 (23 0010)  BP: 121/60 (23 0010)  SpO2: 100 % (23 0010) Vital Signs (24h Range):  Temp:  [97.7 °F (36.5 °C)-98.4 °F (36.9 °C)] 98 °F (36.7 °C)  Pulse:  [48-66] 48  Resp:  [18-24] 24  SpO2:  [100 %] 100 %  BP: (121-151)/(55-65) 121/60        Body mass index is 27.39 kg/m².     Physical Exam  Constitutional:       Appearance: He is ill-appearing.   HENT:      Head: Normocephalic and atraumatic.      Nose: Nose normal.      Mouth/Throat:      Mouth: Mucous membranes are moist.   Eyes:      Pupils: Pupils are equal, round, and reactive to light.   Cardiovascular:      Rate and Rhythm: Normal rate.   Pulmonary:      Effort: Pulmonary effort is normal.   Abdominal:      General: Abdomen is flat. Bowel sounds are normal.   Musculoskeletal:         General: Normal range of motion.      Cervical back: Normal range of motion.   Skin:     Capillary Refill: Capillary refill takes less than 2 seconds.      Coloration: Skin is pale.    Neurological:      General: No focal deficit present.      Mental Status: He is alert and oriented to person, place, and time.            CRANIAL NERVES     CN III, IV, VI   Pupils are equal, round, and reactive to light.     Significant Labs: All pertinent labs within the past 24 hours have been reviewed.    Significant Imaging: I have reviewed all pertinent imaging results/findings within the past 24 hours.    Assessment/Plan:     * Microcytic anemia    Acute anemia severe  Inpatient admit meeting benchmarks  Fe studies  CT ADB P Chest if negative - consider hematologic malignancy , cause     Occult GI bleeding  Has a hx, however patient denied bleeding from any orifice   Stool occult      Bipolar disorder  Not on antipsychotics      Hypertension  Controlled presently off medications        VTE Risk Mitigation (From admission, onward)         Ordered     IP VTE LOW RISK PATIENT  Once         05/16/23 0123     Place sequential compression device  Until discontinued         05/16/23 0123                           Mandy Fritz MD  Department of Hospital Medicine  [place] - Emergency Dept

## 2023-05-16 NOTE — HPI
Very pleasant 68 y/o M with no significant past medical hx, not on prescription medication presents due to lethargy , pallor, dyspnea and light headedness for 2 days. HB 3 mg/dl in ER. Patient denied chest pain bleeding from any orifice, not on anticoagulation no GI work up previously or hx of hematologic disorder

## 2023-05-16 NOTE — CONSULTS
[place] - Emergency Dept  Gastroenterology  Consult Note    Patient Name: Nacho Gusman  MRN: [id number]  Admission Date: 5/15/2023  Hospital Length of Stay: 0 days  Code Status: Full Code   Attending Provider: Mj Shields, *   Consulting Provider: Mansi Dominguez MD  Primary Care Physician: Esmer Licona MD  Principal Problem:Microcytic anemia    Inpatient consult to Gastroenterology-[facility]  Consult performed by: Mansi Dominguez MD  Consult ordered by: Mj Shields MD        Subjective:     HPI:  Mr. Nacho Gusman is a 67 year old male who presented with chronic, symptomatic, iron deficient, transfusion dependent anemia with no overt blood loss. 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 He reports prior EGD/Colonoscopy/VCE with no findings (). Currently denies any nausea, emesis, abdominal pain, hematochezia, melena, or changes in bowel habits. Denies any use of anticoagulants or NSAIDs. No known family history of gastric or colon cancer.       Past Medical History:   Diagnosis Date    [facility]    Gastric ulcer     surgery in     Hypertension     Seizure disorder        Past Surgical History:   Procedure Laterality Date    stomach surgery      for perforated gastric ulcer       Review of patient's allergies indicates:  No Known Allergies  Family History       Problem Relation (Age of Onset)    Hypertension Father, Brother          Tobacco Use    Smoking status: Former     Packs/day: 1.50     Years: 20.00     Pack years: 30.00     Types: Cigarettes     Quit date: 3/2/1997     Years since quittin.2    Smokeless tobacco: Not on file   Substance and Sexual Activity    Alcohol use: No    Drug use: No    Sexual activity: Not on file     Review of Systems   Constitutional:  Positive for fatigue. Negative for appetite change, chills and unexpected weight change.   HENT:  Negative for sore throat and trouble swallowing.     Respiratory:  Negative for chest tightness and shortness of breath.    Cardiovascular:  Negative for chest pain.   Gastrointestinal:  Negative for abdominal distention, abdominal pain, blood in stool, constipation, diarrhea, nausea and vomiting.   Musculoskeletal:  Positive for arthralgias and back pain.   Skin:  Positive for pallor. Negative for rash.   Neurological:  Positive for weakness. Negative for syncope and light-headedness.   Psychiatric/Behavioral:  Negative for behavioral problems and confusion.    Objective:     Vital Signs (Most Recent):  Temp: 98.2 °F (36.8 °C) (05/16/23 0800)  Pulse: (!) 54 (05/16/23 0800)  Resp: 18 (05/16/23 0800)  BP: (!) 117/59 (05/16/23 0800)  SpO2: 100 % (05/16/23 0800) Vital Signs (24h Range):  Temp:  [97.7 °F (36.5 °C)-98.4 °F (36.9 °C)] 98.2 °F (36.8 °C)  Pulse:  [48-66] 54  Resp:  [15-24] 18  SpO2:  [99 %-100 %] 100 %  BP: (117-155)/(55-80) 117/59        Body mass index is 27.39 kg/m².      Intake/Output Summary (Last 24 hours) at 5/16/2023 0838  Last data filed at 5/16/2023 0230  Gross per 24 hour   Intake 677.5 ml   Output --   Net 677.5 ml       Lines/Drains/Airways       Peripheral Intravenous Line  Duration                  Peripheral IV - Single Lumen 05/15/23 1716 20 G Left Antecubital <1 day         Peripheral IV - Single Lumen 05/15/23 1955 18 G Right Antecubital <1 day                     Physical Exam  Constitutional:       Appearance: Normal appearance.   HENT:      Head: Normocephalic and atraumatic.      Mouth/Throat:      Mouth: Mucous membranes are moist.      Pharynx: Oropharynx is clear.   Eyes:      General: No scleral icterus.     Extraocular Movements: Extraocular movements intact.   Cardiovascular:      Rate and Rhythm: Normal rate.      Pulses: Normal pulses.   Pulmonary:      Effort: Pulmonary effort is normal. No respiratory distress.   Abdominal:      General: There is no distension.      Palpations: Abdomen is soft.      Tenderness: There is no  abdominal tenderness.   Skin:     Coloration: Skin is not jaundiced.      Findings: No bruising.   Neurological:      General: No focal deficit present.      Mental Status: He is alert. Mental status is at baseline.   Psychiatric:         Mood and Affect: Mood normal.         Thought Content: Thought content normal.        Significant Labs:  CBC:   Recent Labs   Lab 05/15/23  1717 05/16/23  0529   WBC 5.41  --    HGB 3.2* 4.6*   HCT 12.3* 16.6*     --      CMP:   Recent Labs   Lab 05/16/23  0529   GLU 98   CALCIUM 8.1*   ALBUMIN 3.0*   PROT 5.4*      K 4.6   CO2 18*   *   BUN 15   CREATININE 1.1   ALKPHOS 110   ALT 10   AST 25   BILITOT 1.1*       Significant Imaging:  Imaging results within the past 24 hours have been reviewed.    Assessment/Plan:     Oncology  * Microcytic anemia  Mr. Nacho Gusman is a 67 year old male with history of Billroth II and chronic, severe, iron deficient, transfusion dependent, symptomatic anemia without overt GI blood loss. Prior workup with EGD/Colonoscopy/VCE in 2015 was unrevealing.     - EGD/Colonoscopy tomorrow  - CLD today and NPO at midnight  - Bowel prep ordered  - Consider inpatient iron infusion      Thank you for your consult. I will follow-up with patient. Please contact us if you have any additional questions.    Mansi Dominguez MD  Gastroenterology  [place] - Emergency Dept

## 2023-05-16 NOTE — PHARMACY MED REC
"  Admission Medication History     The home medication history was taken by Aruna Suh CPhT.    Medication history obtained from, Patient Verified    You may go to "Admission" then "Reconcile Home Medications" tabs to review and/or act upon these items.     The home medication list has been updated by the Pharmacy department.   Please read ALL comments highlighted in yellow.   Please address this information as you see fit.    Feel free to contact us if you have any questions or require assistance.      The medications listed below were removed from the home medication list.  Please reorder if appropriate:  Patient reports no longer taking the following medication(s):  Sertraline 100 mg  Zolpidem 5 mg        Aruna Suh CPhT.  Ext [phone]             .        "

## 2023-05-16 NOTE — PLAN OF CARE
VN cued into pt's room for introduction with pt's permission.  VN role explained and informed pt that VN would be working with bedside nurse and the rest of the care team.  Fall risk and bed alarm protocol education provided.  Instructed pt to call for assistance and agreeable.  Allowed time for questions. NAD noted.  Will cont to be available as needed.      05/16/23 1659   Admission   Initial VN Admission Questions Complete   Communication Issues? None   Shift   Virtual Nurse - Patient Verbalized Approval Of Camera Use;VN Rounding   Safety/Activity   Patient Rounds call light in patient/parent reach;clutter free environment maintained;visualized patient   Safety Promotion/Fall Prevention Fall Risk reviewed with patient/family;instructed to call staff for mobility   Pain/Comfort/Sleep   Preferred Pain Scale number (Numeric Rating Pain Scale)   Pain Rating (0-10): Rest 0

## 2023-05-16 NOTE — ED NOTES
Per Dr. Thacekr, draw repeat CBC 2 hours following completion of 2nd unit of blood. It Hgb below 7, inform physician and administer 3rd unit.

## 2023-05-16 NOTE — ASSESSMENT & PLAN NOTE
Acute anemia severe  Inpatient admit meeting benchmarks  Fe studies  CT ADB P Chest if negative - consider hematologic malignancy , cause

## 2023-05-16 NOTE — SUBJECTIVE & OBJECTIVE
Past Medical History:   Diagnosis Date    Depression     [facility]    Gastric ulcer 1997    surgery in     Hypertension     Seizure disorder        Past Surgical History:   Procedure Laterality Date    stomach surgery      for perforated gastric ulcer       Review of patient's allergies indicates:  No Known Allergies    No current facility-administered medications on file prior to encounter.     Current Outpatient Medications on File Prior to Encounter   Medication Sig    ascorbic acid, vitamin C, (VITAMIN C) 500 MG tablet Take 500 mg by mouth once daily.    co-enzyme Q-10 30 mg capsule Take 30 mg by mouth once daily.    folic acid (FOLVITE) 400 MCG tablet Take 400 mcg by mouth once daily.    magnesium oxide (MAG-OX) 400 mg (241.3 mg magnesium) tablet Take 400 mg by mouth once daily.    omega 3-dha-epa-fish oil 1,200 (144-216) mg Cap Take 1,200 mg by mouth 2 (two) times daily.    vitamin E 400 UNIT capsule Take 400 Units by mouth once daily.     Family History       Problem Relation (Age of Onset)    Hypertension Father, Brother          Tobacco Use    Smoking status: Former     Packs/day: 1.50     Years: 20.00     Pack years: 30.00     Types: Cigarettes     Quit date: 3/2/1997     Years since quittin.2    Smokeless tobacco: Not on file   Substance and Sexual Activity    Alcohol use: No    Drug use: No    Sexual activity: Not on file     Review of Systems   Constitutional:  Positive for activity change, appetite change, chills, fatigue and unexpected weight change.   HENT: Negative.     Eyes: Negative.    Respiratory:  Positive for shortness of breath.    Cardiovascular: Negative.    Genitourinary: Negative.    Musculoskeletal: Negative.    Neurological: Negative.    Hematological: Negative.    Psychiatric/Behavioral: Negative.     Objective:     Vital Signs (Most Recent):  Temp: 98 °F (36.7 °C) (23)  Pulse: (!) 48 (23)  Resp: (!) 24 (23)  BP: 121/60  (05/16/23 0010)  SpO2: 100 % (05/16/23 0010) Vital Signs (24h Range):  Temp:  [97.7 °F (36.5 °C)-98.4 °F (36.9 °C)] 98 °F (36.7 °C)  Pulse:  [48-66] 48  Resp:  [18-24] 24  SpO2:  [100 %] 100 %  BP: (121-151)/(55-65) 121/60        Body mass index is 27.39 kg/m².     Physical Exam  Constitutional:       Appearance: He is ill-appearing.   HENT:      Head: Normocephalic and atraumatic.      Nose: Nose normal.      Mouth/Throat:      Mouth: Mucous membranes are moist.   Eyes:      Pupils: Pupils are equal, round, and reactive to light.   Cardiovascular:      Rate and Rhythm: Normal rate.   Pulmonary:      Effort: Pulmonary effort is normal.   Abdominal:      General: Abdomen is flat. Bowel sounds are normal.   Musculoskeletal:         General: Normal range of motion.      Cervical back: Normal range of motion.   Skin:     Capillary Refill: Capillary refill takes less than 2 seconds.      Coloration: Skin is pale.   Neurological:      General: No focal deficit present.      Mental Status: He is alert and oriented to person, place, and time.            CRANIAL NERVES     CN III, IV, VI   Pupils are equal, round, and reactive to light.     Significant Labs: All pertinent labs within the past 24 hours have been reviewed.    Significant Imaging: I have reviewed all pertinent imaging results/findings within the past 24 hours.

## 2023-05-16 NOTE — ED NOTES
MD notified of critical value, and son heart rate. Message sent to provider asking if 4th unit to be infused. pleural effusions were noted on CT and also he is still son at 40-48. Bilateral lower extremities are edematous and patient could benefit from an echo. Recommendations noted to Dr. Shields

## 2023-05-16 NOTE — SUBJECTIVE & OBJECTIVE
Past Medical History:   Diagnosis Date    Depression     [facility]    Gastric ulcer 1997    surgery in     Hypertension     Seizure disorder        Past Surgical History:   Procedure Laterality Date    stomach surgery      for perforated gastric ulcer       Review of patient's allergies indicates:  No Known Allergies  Family History       Problem Relation (Age of Onset)    Hypertension Father, Brother          Tobacco Use    Smoking status: Former     Packs/day: 1.50     Years: 20.00     Pack years: 30.00     Types: Cigarettes     Quit date: 3/2/1997     Years since quittin.2    Smokeless tobacco: Not on file   Substance and Sexual Activity    Alcohol use: No    Drug use: No    Sexual activity: Not on file     Review of Systems   Constitutional:  Positive for fatigue. Negative for appetite change, chills and unexpected weight change.   HENT:  Negative for sore throat and trouble swallowing.    Respiratory:  Negative for chest tightness and shortness of breath.    Cardiovascular:  Negative for chest pain.   Gastrointestinal:  Negative for abdominal distention, abdominal pain, blood in stool, constipation, diarrhea, nausea and vomiting.   Musculoskeletal:  Positive for arthralgias and back pain.   Skin:  Positive for pallor. Negative for rash.   Neurological:  Positive for weakness. Negative for syncope and light-headedness.   Psychiatric/Behavioral:  Negative for behavioral problems and confusion.    Objective:     Vital Signs (Most Recent):  Temp: 98.2 °F (36.8 °C) (23 0800)  Pulse: (!) 54 (23 0800)  Resp: 18 (23 0800)  BP: (!) 117/59 (23 0800)  SpO2: 100 % (23 0800) Vital Signs (24h Range):  Temp:  [97.7 °F (36.5 °C)-98.4 °F (36.9 °C)] 98.2 °F (36.8 °C)  Pulse:  [48-66] 54  Resp:  [15-24] 18  SpO2:  [99 %-100 %] 100 %  BP: (117-155)/(55-80) 117/59        Body mass index is 27.39 kg/m².      Intake/Output Summary (Last 24 hours) at 2023 0850  Last data  filed at 5/16/2023 0230  Gross per 24 hour   Intake 677.5 ml   Output --   Net 677.5 ml       Lines/Drains/Airways       Peripheral Intravenous Line  Duration                  Peripheral IV - Single Lumen 05/15/23 1716 20 G Left Antecubital <1 day         Peripheral IV - Single Lumen 05/15/23 1955 18 G Right Antecubital <1 day                     Physical Exam  Constitutional:       Appearance: Normal appearance.   HENT:      Head: Normocephalic and atraumatic.      Mouth/Throat:      Mouth: Mucous membranes are moist.      Pharynx: Oropharynx is clear.   Eyes:      General: No scleral icterus.     Extraocular Movements: Extraocular movements intact.   Cardiovascular:      Rate and Rhythm: Normal rate.      Pulses: Normal pulses.   Pulmonary:      Effort: Pulmonary effort is normal. No respiratory distress.   Abdominal:      General: There is no distension.      Palpations: Abdomen is soft.      Tenderness: There is no abdominal tenderness.   Skin:     Coloration: Skin is not jaundiced.      Findings: No bruising.   Neurological:      General: No focal deficit present.      Mental Status: He is alert. Mental status is at baseline.   Psychiatric:         Mood and Affect: Mood normal.         Thought Content: Thought content normal.        Significant Labs:  CBC:   Recent Labs   Lab 05/15/23  1717 05/16/23  0529   WBC 5.41  --    HGB 3.2* 4.6*   HCT 12.3* 16.6*     --      CMP:   Recent Labs   Lab 05/16/23  0529   GLU 98   CALCIUM 8.1*   ALBUMIN 3.0*   PROT 5.4*      K 4.6   CO2 18*   *   BUN 15   CREATININE 1.1   ALKPHOS 110   ALT 10   AST 25   BILITOT 1.1*       Significant Imaging:  Imaging results within the past 24 hours have been reviewed.

## 2023-05-16 NOTE — TREATMENT PLAN
Mr. Gusman presents with severe symptomatic anemia with hemoglobin 3.2 on arrival.  No overt GI blood loss but given significant history of prior peptic ulcer disease with perforation and iron deficiency, would suspect GI source strongly as etiology.  Will start on IV Protonix and give IV iron infusion.  Multiple blood transfusion have been ordered; will follow-up on repeat CBC.  GI consulted and recommending EGD/colonoscopy after patient has blood repleted and is able to undergo prep.  Clear liquid diet today and NPO at midnight.      Mj Shields MD  [facility]

## 2023-05-16 NOTE — HPI
Mr. Nacho Gusman is a 67 year old male who presented with chronic, symptomatic, iron deficient, transfusion dependent anemia with no overt blood loss. 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 He reports prior EGD/Colonoscopy/VCE with no findings (2015). Currently denies any nausea, emesis, abdominal pain, hematochezia, melena, or changes in bowel habits. Denies any use of anticoagulants or NSAIDs. No known family history of gastric or colon cancer.

## 2023-05-16 NOTE — ASSESSMENT & PLAN NOTE
Mr. Nacho Gusman is a 67 year old male with history of Billroth II and chronic, severe, iron deficient, transfusion dependent, symptomatic anemia without overt GI blood loss. Prior workup with EGD/Colonoscopy/VCE in 2015 was unrevealing.     - EGD/Colonoscopy tomorrow  - CLD today and NPO at midnight  - Bowel prep ordered  - Consider inpatient iron infusion

## 2023-05-17 ENCOUNTER — ANESTHESIA EVENT (OUTPATIENT)
Dept: ENDOSCOPY | Facility: HOSPITAL | Age: 68
DRG: 812 | End: 2023-05-17
Payer: MEDICARE

## 2023-05-17 ENCOUNTER — ANESTHESIA (OUTPATIENT)
Dept: ENDOSCOPY | Facility: HOSPITAL | Age: 68
DRG: 812 | End: 2023-05-17
Payer: MEDICARE

## 2023-05-17 PROBLEM — Z87.11 HISTORY OF PEPTIC ULCER DISEASE: Status: ACTIVE | Noted: 2023-05-17

## 2023-05-17 PROBLEM — E88.09 HYPOALBUMINEMIA: Status: ACTIVE | Noted: 2023-05-17

## 2023-05-17 PROBLEM — J90 PLEURAL EFFUSION: Status: ACTIVE | Noted: 2023-05-17

## 2023-05-17 LAB
ALBUMIN SERPL BCP-MCNC: 3 G/DL (ref 3.5–5.2)
ALP SERPL-CCNC: 114 U/L (ref 55–135)
ALT SERPL W/O P-5'-P-CCNC: 10 U/L (ref 10–44)
ANION GAP SERPL CALC-SCNC: 10 MMOL/L (ref 8–16)
AST SERPL-CCNC: 13 U/L (ref 10–40)
BASOPHILS # BLD AUTO: 0.03 K/UL (ref 0–0.2)
BASOPHILS # BLD AUTO: 0.03 K/UL (ref 0–0.2)
BASOPHILS NFR BLD: 0.7 % (ref 0–1.9)
BASOPHILS NFR BLD: 0.7 % (ref 0–1.9)
BILIRUB SERPL-MCNC: 1.5 MG/DL (ref 0.1–1)
BUN SERPL-MCNC: 11 MG/DL (ref 8–23)
CALCIUM SERPL-MCNC: 8.5 MG/DL (ref 8.7–10.5)
CHLORIDE SERPL-SCNC: 107 MMOL/L (ref 95–110)
CO2 SERPL-SCNC: 21 MMOL/L (ref 23–29)
CREAT SERPL-MCNC: 1 MG/DL (ref 0.5–1.4)
DIFFERENTIAL METHOD: ABNORMAL
DIFFERENTIAL METHOD: ABNORMAL
EOSINOPHIL # BLD AUTO: 0.5 K/UL (ref 0–0.5)
EOSINOPHIL # BLD AUTO: 0.5 K/UL (ref 0–0.5)
EOSINOPHIL NFR BLD: 10.2 % (ref 0–8)
EOSINOPHIL NFR BLD: 10.7 % (ref 0–8)
ERYTHROCYTE [DISTWIDTH] IN BLOOD BY AUTOMATED COUNT: 23.3 % (ref 11.5–14.5)
ERYTHROCYTE [DISTWIDTH] IN BLOOD BY AUTOMATED COUNT: 24.4 % (ref 11.5–14.5)
EST. GFR  (NO RACE VARIABLE): >60 ML/MIN/1.73 M^2
GLUCOSE SERPL-MCNC: 90 MG/DL (ref 70–110)
HCT VFR BLD AUTO: 24 % (ref 40–54)
HCT VFR BLD AUTO: 25.9 % (ref 40–54)
HGB BLD-MCNC: 7.2 G/DL (ref 14–18)
HGB BLD-MCNC: 7.6 G/DL (ref 14–18)
IMM GRANULOCYTES # BLD AUTO: 0.01 K/UL (ref 0–0.04)
IMM GRANULOCYTES # BLD AUTO: 0.03 K/UL (ref 0–0.04)
IMM GRANULOCYTES NFR BLD AUTO: 0.2 % (ref 0–0.5)
IMM GRANULOCYTES NFR BLD AUTO: 0.7 % (ref 0–0.5)
LYMPHOCYTES # BLD AUTO: 0.7 K/UL (ref 1–4.8)
LYMPHOCYTES # BLD AUTO: 0.7 K/UL (ref 1–4.8)
LYMPHOCYTES NFR BLD: 15.2 % (ref 18–48)
LYMPHOCYTES NFR BLD: 17 % (ref 18–48)
MCH RBC QN AUTO: 21.7 PG (ref 27–31)
MCH RBC QN AUTO: 22 PG (ref 27–31)
MCHC RBC AUTO-ENTMCNC: 29.3 G/DL (ref 32–36)
MCHC RBC AUTO-ENTMCNC: 30 G/DL (ref 32–36)
MCV RBC AUTO: 73 FL (ref 82–98)
MCV RBC AUTO: 74 FL (ref 82–98)
MONOCYTES # BLD AUTO: 0.5 K/UL (ref 0.3–1)
MONOCYTES # BLD AUTO: 0.5 K/UL (ref 0.3–1)
MONOCYTES NFR BLD: 11.6 % (ref 4–15)
MONOCYTES NFR BLD: 12.1 % (ref 4–15)
NEUTROPHILS # BLD AUTO: 2.5 K/UL (ref 1.8–7.7)
NEUTROPHILS # BLD AUTO: 2.7 K/UL (ref 1.8–7.7)
NEUTROPHILS NFR BLD: 58.8 % (ref 38–73)
NEUTROPHILS NFR BLD: 62.1 % (ref 38–73)
NRBC BLD-RTO: 1 /100 WBC
NRBC BLD-RTO: 1 /100 WBC
PLATELET # BLD AUTO: 147 K/UL (ref 150–450)
PLATELET # BLD AUTO: 150 K/UL (ref 150–450)
PMV BLD AUTO: 10.2 FL (ref 9.2–12.9)
PMV BLD AUTO: 10.3 FL (ref 9.2–12.9)
POCT GLUCOSE: 87 MG/DL (ref 70–110)
POCT GLUCOSE: 99 MG/DL (ref 70–110)
POTASSIUM SERPL-SCNC: 3.7 MMOL/L (ref 3.5–5.1)
PROT SERPL-MCNC: 5.3 G/DL (ref 6–8.4)
RBC # BLD AUTO: 3.28 M/UL (ref 4.6–6.2)
RBC # BLD AUTO: 3.5 M/UL (ref 4.6–6.2)
SODIUM SERPL-SCNC: 138 MMOL/L (ref 136–145)
WBC # BLD AUTO: 4.29 K/UL (ref 3.9–12.7)
WBC # BLD AUTO: 4.41 K/UL (ref 3.9–12.7)

## 2023-05-17 PROCEDURE — 36415 COLL VENOUS BLD VENIPUNCTURE: CPT | Performed by: HOSPITALIST

## 2023-05-17 PROCEDURE — 37000008 HC ANESTHESIA 1ST 15 MINUTES: Performed by: INTERNAL MEDICINE

## 2023-05-17 PROCEDURE — D9220A PRA ANESTHESIA: ICD-10-PCS | Mod: ANES,,, | Performed by: ANESTHESIOLOGY

## 2023-05-17 PROCEDURE — 88305 TISSUE EXAM BY PATHOLOGIST: ICD-10-PCS | Mod: 26,,, | Performed by: STUDENT IN AN ORGANIZED HEALTH CARE EDUCATION/TRAINING PROGRAM

## 2023-05-17 PROCEDURE — 36415 COLL VENOUS BLD VENIPUNCTURE: CPT | Performed by: INTERNAL MEDICINE

## 2023-05-17 PROCEDURE — 88305 TISSUE EXAM BY PATHOLOGIST: CPT | Mod: 26,,, | Performed by: STUDENT IN AN ORGANIZED HEALTH CARE EDUCATION/TRAINING PROGRAM

## 2023-05-17 PROCEDURE — 43239 PR EGD, FLEX, W/BIOPSY, SGL/MULTI: ICD-10-PCS | Mod: 51,,, | Performed by: INTERNAL MEDICINE

## 2023-05-17 PROCEDURE — C9113 INJ PANTOPRAZOLE SODIUM, VIA: HCPCS | Performed by: HOSPITALIST

## 2023-05-17 PROCEDURE — 37000009 HC ANESTHESIA EA ADD 15 MINS: Performed by: INTERNAL MEDICINE

## 2023-05-17 PROCEDURE — 25000003 PHARM REV CODE 250: Performed by: NURSE ANESTHETIST, CERTIFIED REGISTERED

## 2023-05-17 PROCEDURE — 85025 COMPLETE CBC W/AUTO DIFF WBC: CPT | Performed by: HOSPITALIST

## 2023-05-17 PROCEDURE — 80053 COMPREHEN METABOLIC PANEL: CPT | Performed by: INTERNAL MEDICINE

## 2023-05-17 PROCEDURE — 11000001 HC ACUTE MED/SURG PRIVATE ROOM

## 2023-05-17 PROCEDURE — 43239 EGD BIOPSY SINGLE/MULTIPLE: CPT | Performed by: INTERNAL MEDICINE

## 2023-05-17 PROCEDURE — 25000003 PHARM REV CODE 250: Performed by: HOSPITALIST

## 2023-05-17 PROCEDURE — D9220A PRA ANESTHESIA: Mod: ANES,,, | Performed by: ANESTHESIOLOGY

## 2023-05-17 PROCEDURE — 45378 DIAGNOSTIC COLONOSCOPY: CPT | Performed by: INTERNAL MEDICINE

## 2023-05-17 PROCEDURE — 63600175 PHARM REV CODE 636 W HCPCS: Performed by: HOSPITALIST

## 2023-05-17 PROCEDURE — 27201012 HC FORCEPS, HOT/COLD, DISP: Performed by: INTERNAL MEDICINE

## 2023-05-17 PROCEDURE — D9220A PRA ANESTHESIA: Mod: CRNA,,, | Performed by: NURSE ANESTHETIST, CERTIFIED REGISTERED

## 2023-05-17 PROCEDURE — 45378 DIAGNOSTIC COLONOSCOPY: CPT | Mod: ,,, | Performed by: INTERNAL MEDICINE

## 2023-05-17 PROCEDURE — D9220A PRA ANESTHESIA: ICD-10-PCS | Mod: CRNA,,, | Performed by: NURSE ANESTHETIST, CERTIFIED REGISTERED

## 2023-05-17 PROCEDURE — 63600175 PHARM REV CODE 636 W HCPCS: Performed by: NURSE ANESTHETIST, CERTIFIED REGISTERED

## 2023-05-17 PROCEDURE — 88305 TISSUE EXAM BY PATHOLOGIST: CPT | Performed by: STUDENT IN AN ORGANIZED HEALTH CARE EDUCATION/TRAINING PROGRAM

## 2023-05-17 PROCEDURE — 43239 EGD BIOPSY SINGLE/MULTIPLE: CPT | Mod: 51,,, | Performed by: INTERNAL MEDICINE

## 2023-05-17 PROCEDURE — 45378 PR COLONOSCOPY,DIAGNOSTIC: ICD-10-PCS | Mod: ,,, | Performed by: INTERNAL MEDICINE

## 2023-05-17 PROCEDURE — 25000003 PHARM REV CODE 250: Performed by: INTERNAL MEDICINE

## 2023-05-17 RX ORDER — FERROUS SULFATE 325(65) MG
325 TABLET ORAL
Qty: 30 TABLET | Refills: 11 | OUTPATIENT
Start: 2023-05-17 | End: 2024-05-16

## 2023-05-17 RX ORDER — PROPOFOL 10 MG/ML
VIAL (ML) INTRAVENOUS
Status: DISCONTINUED | OUTPATIENT
Start: 2023-05-17 | End: 2023-05-17

## 2023-05-17 RX ORDER — LIDOCAINE HYDROCHLORIDE 20 MG/ML
INJECTION INTRAVENOUS
Status: DISCONTINUED | OUTPATIENT
Start: 2023-05-17 | End: 2023-05-17

## 2023-05-17 RX ORDER — SUCRALFATE 1 G/1
1 TABLET ORAL 2 TIMES DAILY
Qty: 60 TABLET | Refills: 11 | OUTPATIENT
Start: 2023-05-17 | End: 2024-05-16

## 2023-05-17 RX ORDER — SUCRALFATE 1 G/1
1 TABLET ORAL 2 TIMES DAILY
Status: DISCONTINUED | OUTPATIENT
Start: 2023-05-17 | End: 2023-05-19 | Stop reason: HOSPADM

## 2023-05-17 RX ORDER — PANTOPRAZOLE SODIUM 40 MG/1
40 TABLET, DELAYED RELEASE ORAL DAILY
Qty: 30 TABLET | Refills: 11 | OUTPATIENT
Start: 2023-05-17 | End: 2024-05-16

## 2023-05-17 RX ORDER — PROPOFOL 10 MG/ML
VIAL (ML) INTRAVENOUS CONTINUOUS PRN
Status: DISCONTINUED | OUTPATIENT
Start: 2023-05-17 | End: 2023-05-17

## 2023-05-17 RX ADMIN — SUCRALFATE 1 G: 1 TABLET ORAL at 08:05

## 2023-05-17 RX ADMIN — PROPOFOL 150 MCG/KG/MIN: 10 INJECTION, EMULSION INTRAVENOUS at 03:05

## 2023-05-17 RX ADMIN — PANTOPRAZOLE SODIUM 40 MG: 40 INJECTION, POWDER, LYOPHILIZED, FOR SOLUTION INTRAVENOUS at 08:05

## 2023-05-17 RX ADMIN — SODIUM CHLORIDE: 0.9 INJECTION, SOLUTION INTRAVENOUS at 03:05

## 2023-05-17 RX ADMIN — GLYCOPYRROLATE 0.2 MG: 0.2 INJECTION, SOLUTION INTRAMUSCULAR; INTRAVITREAL at 03:05

## 2023-05-17 RX ADMIN — LIDOCAINE HYDROCHLORIDE 100 MG: 20 INJECTION, SOLUTION INTRAVENOUS at 03:05

## 2023-05-17 RX ADMIN — PROPOFOL 50 MG: 10 INJECTION, EMULSION INTRAVENOUS at 03:05

## 2023-05-17 RX ADMIN — IRON SUCROSE 200 MG: 20 INJECTION, SOLUTION INTRAVENOUS at 06:05

## 2023-05-17 RX ADMIN — TOPICAL ANESTHETIC 1 EACH: 200 SPRAY DENTAL; PERIODONTAL at 03:05

## 2023-05-17 RX ADMIN — GUAIFENESIN AND DEXTROMETHORPHAN 5 ML: 100; 10 SYRUP ORAL at 11:05

## 2023-05-17 NOTE — PLAN OF CARE
Problem: Adult Inpatient Plan of Care  Goal: Plan of Care Review  Outcome: Ongoing, Progressing     Vital signs, labs, progress notes and care plan reviewed.

## 2023-05-17 NOTE — OR NURSING
"Patient scheduled for EGD/Colon, chart reviewed and report taken from nurse. NPO since MN, IVs in place and bowel prep completed, results reported as "brown".  Total of 4u PRBC's transfused. Ready for procedures.  "

## 2023-05-17 NOTE — ASSESSMENT & PLAN NOTE
Acute anemia severe  Inpatient admit meeting benchmarks  Fe studies consistent with deficiency; given IV iron  -GI consulted with plan for EGD/colonoscopy today  -history of perforated peptic ulcer disease; on IV pantoprazole and should be on PPI chronically

## 2023-05-17 NOTE — TRANSFER OF CARE
"Anesthesia Transfer of Care Note    Patient: Nacho Gusman    Procedure(s) Performed: Procedure(s) (LRB):  EGD (ESOPHAGOGASTRODUODENOSCOPY) (N/A)  COLONOSCOPY (N/A)    Patient location: GI    Anesthesia Type: general    Transport from OR: Transported from OR on room air with adequate spontaneous ventilation    Post pain: adequate analgesia    Post assessment: no apparent anesthetic complications and tolerated procedure well    Post vital signs: stable    Level of consciousness: sedated    Nausea/Vomiting: no nausea/vomiting    Complications: none    Transfer of care protocol was followed      Last vitals:   Visit Vitals  BP (!) 115/56 (BP Location: Left arm, Patient Position: Lying)   Pulse 60   Temp 36.2 °C (97.2 °F) (Oral)   Resp 18   Ht (P) 5' 10" (1.778 m)   Wt 68.7 kg (151 lb 7.3 oz)   SpO2 99%   BMI (P) 21.73 kg/m²     "

## 2023-05-17 NOTE — PROVATION PATIENT INSTRUCTIONS
Discharge Summary/Instructions after an Endoscopic Procedure  Patient Name: Nacho Gusman  Patient MRN: [id number]  Patient YOB: 1955  Wednesday, May 17, 2023  Alberto Alfonso MD  Dear patient,  As a result of recent federal legislation (The Federal Cures Act), you may   receive lab or pathology results from your procedure in your [facility]   account before your physician is able to contact you. Your physician or   their representative will relay the results to you with their   recommendations at their soonest availability.  Thank you,  Your health is very important to us during the Covid Crisis. Following your   procedure today, you will receive a daily text for 2 weeks asking about   signs or symptoms of Covid 19.  Please respond to this text when you   receive it so we can follow up and keep you as safe as possible.   RESTRICTIONS:  During your procedure today, you received medications for sedation.  These   medications may affect your judgment, balance and coordination.  Therefore,   for 24 hours, you have the following restrictions:   - DO NOT drive a car, operate machinery, make legal/financial decisions,   sign important papers or drink alcohol.    ACTIVITY:  Today: no heavy lifting, straining or running due to procedural   sedation/anesthesia.  The following day: return to full activity including work.  DIET:  Eat and drink normally unless instructed otherwise.     TREATMENT FOR COMMON SIDE EFFECTS:  - Mild abdominal pain, nausea, belching, bloating or excessive gas:  rest,   eat lightly and use a heating pad.  - Sore Throat: treat with throat lozenges and/or gargle with warm salt   water.  - Because air was used during the procedure, expelling large amounts of air   from your rectum or belching is normal.  - If a bowel prep was taken, you may not have a bowel movement for 1-3 days.    This is normal.  SYMPTOMS TO WATCH FOR AND REPORT TO YOUR PHYSICIAN:  1. Abdominal pain or bloating, other than  gas cramps.  2. Chest pain.  3. Back pain.  4. Signs of infection such as: chills or fever occurring within 24 hours   after the procedure.  5. Rectal bleeding, which would show as bright red, maroon, or black stools.   (A tablespoon of blood from the rectum is not serious, especially if   hemorrhoids are present.)  6. Vomiting.  7. Weakness or dizziness.  GO DIRECTLY TO THE NEAREST EMERGENCY ROOM IF YOU HAVE ANY OF THE FOLLOWING:      Difficulty breathing              Chills and/or fever over 101 F   Persistent vomiting and/or vomiting blood   Severe abdominal pain   Severe chest pain   Black, tarry stools   Bleeding- more than one tablespoon   Any other symptom or condition that you feel may need urgent attention  Your doctor recommends these additional instructions:  If any biopsies were taken, your doctors clinic will contact you in 1 to 2   weeks with any results.  - [facility] for [facility].   - Resume previous diet.   - Continue present medications.   - use protonix daily ; use carafate twice a day ; suggest setting up IV iron   intermittently   - Await pathology results.   - Perform a colonoscopy today.   - See the other procedure note for documentation of additional   recommendations.  For questions, problems or results please call your physician - Alberto Alfonso MD.  EMERGENCY PHONE NUMBER: [phone],  LAB RESULTS: [phone]  IF A COMPLICATION OR EMERGENCY SITUATION ARISES AND YOU ARE UNABLE TO REACH   YOUR PHYSICIAN - GO DIRECTLY TO THE EMERGENCY ROOM.  Alberto Alfonso MD  5/17/2023 4:09:35 PM  This report has been verified and signed electronically.  Dear patient,  As a result of recent federal legislation (The Federal Cures Act), you may   receive lab or pathology results from your procedure in your [facility]   account before your physician is able to contact you. Your physician or   their representative will relay the results to you with their   recommendations at  their soonest availability.  Thank you,  PROVATION

## 2023-05-17 NOTE — ANESTHESIA POSTPROCEDURE EVALUATION
Anesthesia Post Evaluation    Patient: Nacho Gusman    Procedure(s) Performed: Procedure(s) (LRB):  EGD (ESOPHAGOGASTRODUODENOSCOPY) (N/A)  COLONOSCOPY (N/A)    Final Anesthesia Type: general      Patient location during evaluation: PACU  Patient participation: Yes- Able to Participate  Level of consciousness: awake and alert  Post-procedure vital signs: reviewed and stable  Pain management: adequate  Airway patency: patent    PONV status at discharge: No PONV  Anesthetic complications: no      Cardiovascular status: blood pressure returned to baseline  Respiratory status: unassisted  Hydration status: euvolemic  Follow-up not needed.          Vitals Value Taken Time   /48 05/17/23 1630   Temp 36.9 05/17/23 1639   Pulse 66 05/17/23 1630   Resp 14 05/17/23 1630   SpO2 100 % 05/17/23 1630         No case tracking events are documented in the log.      Pain/Carlos Score: Carlos Score: 9 (5/17/2023  4:30 PM)

## 2023-05-17 NOTE — ASSESSMENT & PLAN NOTE
-noted on admission CT chest abdomen pelvis  -given dose of IV Lasix at admission; may benefit from further workup of this and lung changes in outpatient setting

## 2023-05-17 NOTE — PROVATION PATIENT INSTRUCTIONS
Discharge Summary/Instructions after an Endoscopic Procedure  Patient Name: Nacho Gusman  Patient MRN: [id number]  Patient YOB: 1955  Wednesday, May 17, 2023  Alberto Alfonso MD  Dear patient,  As a result of recent federal legislation (The Federal Cures Act), you may   receive lab or pathology results from your procedure in your [facility]   account before your physician is able to contact you. Your physician or   their representative will relay the results to you with their   recommendations at their soonest availability.  Thank you,  Your health is very important to us during the Covid Crisis. Following your   procedure today, you will receive a daily text for 2 weeks asking about   signs or symptoms of Covid 19.  Please respond to this text when you   receive it so we can follow up and keep you as safe as possible.   RESTRICTIONS:  During your procedure today, you received medications for sedation.  These   medications may affect your judgment, balance and coordination.  Therefore,   for 24 hours, you have the following restrictions:   - DO NOT drive a car, operate machinery, make legal/financial decisions,   sign important papers or drink alcohol.    ACTIVITY:  Today: no heavy lifting, straining or running due to procedural   sedation/anesthesia.  The following day: return to full activity including work.  DIET:  Eat and drink normally unless instructed otherwise.     TREATMENT FOR COMMON SIDE EFFECTS:  - Mild abdominal pain, nausea, belching, bloating or excessive gas:  rest,   eat lightly and use a heating pad.  - Sore Throat: treat with throat lozenges and/or gargle with warm salt   water.  - Because air was used during the procedure, expelling large amounts of air   from your rectum or belching is normal.  - If a bowel prep was taken, you may not have a bowel movement for 1-3 days.    This is normal.  SYMPTOMS TO WATCH FOR AND REPORT TO YOUR PHYSICIAN:  1. Abdominal pain or bloating, other than  gas cramps.  2. Chest pain.  3. Back pain.  4. Signs of infection such as: chills or fever occurring within 24 hours   after the procedure.  5. Rectal bleeding, which would show as bright red, maroon, or black stools.   (A tablespoon of blood from the rectum is not serious, especially if   hemorrhoids are present.)  6. Vomiting.  7. Weakness or dizziness.  GO DIRECTLY TO THE NEAREST EMERGENCY ROOM IF YOU HAVE ANY OF THE FOLLOWING:      Difficulty breathing              Chills and/or fever over 101 F   Persistent vomiting and/or vomiting blood   Severe abdominal pain   Severe chest pain   Black, tarry stools   Bleeding- more than one tablespoon   Any other symptom or condition that you feel may need urgent attention  Your doctor recommends these additional instructions:  If any biopsies were taken, your doctors clinic will contact you in 1 to 2   weeks with any results.  - Return patient to [facility] for possible discharge same day.   - Advance diet as tolerated.   - Continue present medications.   - Repeat colonoscopy in 6 months because the bowel preparation was poor.  For questions, problems or results please call your physician - Alberto Alfonso MD.  EMERGENCY PHONE NUMBER: [phone],  LAB RESULTS: [phone]  IF A COMPLICATION OR EMERGENCY SITUATION ARISES AND YOU ARE UNABLE TO REACH   YOUR PHYSICIAN - GO DIRECTLY TO THE EMERGENCY ROOM.  Alberto Alfonso MD  5/17/2023 4:25:41 PM  This report has been verified and signed electronically.  Dear patient,  As a result of recent federal legislation (The Federal Cures Act), you may   receive lab or pathology results from your procedure in your [facility]   account before your physician is able to contact you. Your physician or   their representative will relay the results to you with their   recommendations at their soonest availability.  Thank you,  PROVATION

## 2023-05-17 NOTE — ANESTHESIA PREPROCEDURE EVALUATION
05/17/2023  Nacho Gusman is a 67 y.o., male.      Pre-op Assessment    I have reviewed the NPO Status.   I have reviewed the Medications.     Review of Systems  Anesthesia Hx:  No problems with previous Anesthesia  Denies Family Hx of Anesthesia complications.   Denies Personal Hx of Anesthesia complications.   Hematology/Oncology:     Oncology Normal    -- Anemia:   EENT/Dental:EENT/Dental Normal   Cardiovascular:   Hypertension    Pulmonary:  Pulmonary Normal    Hepatic/GI:   PUD, GI Bleed    H/o Gastric Ulcer   Musculoskeletal:  Musculoskeletal Normal    Neurological:   Seizures    Endocrine:  Endocrine Normal    Dermatological:  Skin Normal    Psych:   depression          Physical Exam  General: Alert and Oriented    Airway:  Mallampati: II   Mouth Opening: Normal  TM Distance: Normal  Tongue: Normal  Neck ROM: Normal ROM    Chest/Lungs:  Clear to auscultation, Normal Respiratory Rate    Heart:  Rate: Normal  Rhythm: Regular Rhythm  Sounds: Normal        Anesthesia Plan  Type of Anesthesia, risks & benefits discussed:    Anesthesia Type: MAC  Intra-op Monitoring Plan: Standard ASA Monitors  Post Op Pain Control Plan: multimodal analgesia and IV/PO Opioids PRN  Induction:  IV  Informed Consent: Informed consent signed with the Patient and all parties understand the risks and agree with anesthesia plan.  All questions answered.   ASA Score: 2  Day of Surgery Review of History & Physical: H&P Update referred to the surgeon/provider.    Ready For Surgery From Anesthesia Perspective.     .

## 2023-05-17 NOTE — PROGRESS NOTES
[facility] Medicine  Progress Note    Patient Name: Nacho Gusman  MRN: [id number]  Patient Class: IP- Inpatient   Admission Date: 5/15/2023  Length of Stay: 1 days  Attending Physician: Mj Shields, *  Primary Care Provider: Esmer Licona MD        Subjective:     Principal Problem:Microcytic anemia        HPI:  Very pleasant 66 y/o M with no significant past medical hx, not on prescription medication presents due to lethargy , pallor, dyspnea and light headedness for 2 days. HB 3 mg/dl in ER. Patient denied chest pain bleeding from any orifice, not on anticoagulation no GI work up previously or hx of hematologic disorder      Overview/Hospital Course:  No notes on file    Interval History:  Feeling better today after receiving transfusions yesterday.  Denies any further bleeding.  We are awaiting EGD/colonoscopy today    Review of Systems   Constitutional:  Positive for fatigue.   Objective:     Vital Signs (Most Recent):  Temp: 98.4 °F (36.9 °C) (05/17/23 0711)  Pulse: (!) 50 (05/17/23 0711)  Resp: 18 (05/17/23 0711)  BP: (!) 143/60 (05/17/23 0711)  SpO2: 96 % (05/17/23 0711) Vital Signs (24h Range):  Temp:  [96 °F (35.6 °C)-98.4 °F (36.9 °C)] 98.4 °F (36.9 °C)  Pulse:  [40-67] 50  Resp:  [14-20] 18  SpO2:  [95 %-100 %] 96 %  BP: (106-145)/(51-68) 143/60     Weight: 68.7 kg (151 lb 7.3 oz)  Body mass index is 21.73 kg/m² (pended).    Intake/Output Summary (Last 24 hours) at 5/17/2023 1032  Last data filed at 5/17/2023 0400  Gross per 24 hour   Intake 3932.25 ml   Output 800 ml   Net 3132.25 ml         Physical Exam  Constitutional:       Appearance: Normal appearance.   HENT:      Head: Normocephalic and atraumatic.      Mouth/Throat:      Mouth: Mucous membranes are moist.      Pharynx: Oropharynx is clear.   Eyes:      General: No scleral icterus.     Extraocular Movements: Extraocular movements intact.   Cardiovascular:      Rate and Rhythm: Normal rate.      Pulses: Normal pulses.    Pulmonary:      Effort: Pulmonary effort is normal. No respiratory distress.   Abdominal:      General: There is no distension.      Palpations: Abdomen is soft.      Tenderness: There is no abdominal tenderness.   Skin:     Coloration: Skin is not jaundiced.      Findings: No bruising.   Neurological:      General: No focal deficit present.      Mental Status: He is alert. Mental status is at baseline.   Psychiatric:         Mood and Affect: Mood normal.         Thought Content: Thought content normal.           Significant Labs: All pertinent labs within the past 24 hours have been reviewed.    Significant Imaging: I have reviewed all pertinent imaging results/findings within the past 24 hours.      Assessment/Plan:      * Microcytic anemia  Acute anemia severe  Inpatient admit meeting benchmarks  Fe studies consistent with deficiency; given IV iron  -GI consulted with plan for EGD/colonoscopy today  -history of perforated peptic ulcer disease; on IV pantoprazole and should be on PPI chronically    Pleural effusion  -noted on admission CT chest abdomen pelvis  -given dose of IV Lasix at admission; may benefit from further workup of this and lung changes in outpatient setting      Hypoalbuminemia  -mild and noted on admission      History of peptic ulcer disease  -with perforation   -patient should be on chronic PPI      Occult GI bleeding  Has a hx, however patient denied bleeding from any orifice   -GI consulted      Bipolar disorder  Not on antipsychotics      Hypertension  Controlled presently off medications        VTE Risk Mitigation (From admission, onward)         Ordered     IP VTE LOW RISK PATIENT  Once         05/16/23 0123     Place sequential compression device  Until discontinued         05/16/23 0123                Discharge Planning   LAUREN:      Code Status: Full Code   Is the patient medically ready for discharge?:     Reason for patient still in hospital (select all that apply): Treatment and  Consult recommendations  Discharge Plan A: Home                  Mj Shields MD  Department of Hospital Medicine   [facility]

## 2023-05-17 NOTE — PLAN OF CARE
Report called to keiry   patient alert and oriented  md at bedside   Recovery complete. Patient recovered to baseline. Discharge instructions reviewed with patient. VSS.

## 2023-05-17 NOTE — PLAN OF CARE
"  Problem: Adult Inpatient Plan of Care  Goal: Plan of Care Review  Outcome: Ongoing, Progressing  Goal: Patient-Specific Goal (Individualized)  Outcome: Ongoing, Progressing  Goal: Absence of Hospital-Acquired Illness or Injury  Outcome: Ongoing, Progressing  Goal: Optimal Comfort and Wellbeing  Outcome: Ongoing, Progressing     Problem: Anemia  Goal: Anemia Symptom Improvement  Outcome: Ongoing, Progressing     Problem: Fall Injury Risk  Goal: Absence of Fall and Fall-Related Injury  Outcome: Ongoing, Progressing     Patient became agitated when educating on the reasoning for GoLytely, Pt stated " I am only concerned about having coffee and food". Explained to pt that I would provide coffee and food after education, pt agreeable to start drinking. Pt tolerating well, denies nausea/vomiting, multiple bowel movements. Pt verbalized understanding of NPO status. Instructed pt to call if assistance is needed.   "

## 2023-05-17 NOTE — PLAN OF CARE
SW met with pt at bedside during rounding with MD. No dc today. SW will continue to follow pt throughout his transitions of care and assist with any dc needs. Pt reports that his car is outside in parking lot. Pt reports upon dc he will transport himself home.     Future Appointments   Date Time Provider Department Center   6/5/2023  1:30 PM Campos Saha DO [facility]        05/17/23 0948   Post-Acute Status   Post-Acute Authorization Other

## 2023-05-17 NOTE — SUBJECTIVE & OBJECTIVE
Interval History:  Feeling better today after receiving transfusions yesterday.  Denies any further bleeding.  We are awaiting EGD/colonoscopy today    Review of Systems   Constitutional:  Positive for fatigue.   Objective:     Vital Signs (Most Recent):  Temp: 98.4 °F (36.9 °C) (05/17/23 0711)  Pulse: (!) 50 (05/17/23 0711)  Resp: 18 (05/17/23 0711)  BP: (!) 143/60 (05/17/23 0711)  SpO2: 96 % (05/17/23 0711) Vital Signs (24h Range):  Temp:  [96 °F (35.6 °C)-98.4 °F (36.9 °C)] 98.4 °F (36.9 °C)  Pulse:  [40-67] 50  Resp:  [14-20] 18  SpO2:  [95 %-100 %] 96 %  BP: (106-145)/(51-68) 143/60     Weight: 68.7 kg (151 lb 7.3 oz)  Body mass index is 21.73 kg/m² (pended).    Intake/Output Summary (Last 24 hours) at 5/17/2023 1032  Last data filed at 5/17/2023 0400  Gross per 24 hour   Intake 3932.25 ml   Output 800 ml   Net 3132.25 ml         Physical Exam  Constitutional:       Appearance: Normal appearance.   HENT:      Head: Normocephalic and atraumatic.      Mouth/Throat:      Mouth: Mucous membranes are moist.      Pharynx: Oropharynx is clear.   Eyes:      General: No scleral icterus.     Extraocular Movements: Extraocular movements intact.   Cardiovascular:      Rate and Rhythm: Normal rate.      Pulses: Normal pulses.   Pulmonary:      Effort: Pulmonary effort is normal. No respiratory distress.   Abdominal:      General: There is no distension.      Palpations: Abdomen is soft.      Tenderness: There is no abdominal tenderness.   Skin:     Coloration: Skin is not jaundiced.      Findings: No bruising.   Neurological:      General: No focal deficit present.      Mental Status: He is alert. Mental status is at baseline.   Psychiatric:         Mood and Affect: Mood normal.         Thought Content: Thought content normal.           Significant Labs: All pertinent labs within the past 24 hours have been reviewed.    Significant Imaging: I have reviewed all pertinent imaging results/findings within the past 24 hours.

## 2023-05-18 LAB
ALBUMIN SERPL BCP-MCNC: 2.9 G/DL (ref 3.5–5.2)
ALP SERPL-CCNC: 111 U/L (ref 55–135)
ALT SERPL W/O P-5'-P-CCNC: 11 U/L (ref 10–44)
ANION GAP SERPL CALC-SCNC: 9 MMOL/L (ref 8–16)
AST SERPL-CCNC: 16 U/L (ref 10–40)
BASOPHILS # BLD AUTO: 0.05 K/UL (ref 0–0.2)
BASOPHILS NFR BLD: 1 % (ref 0–1.9)
BILIRUB SERPL-MCNC: 0.7 MG/DL (ref 0.1–1)
BUN SERPL-MCNC: 9 MG/DL (ref 8–23)
CALCIUM SERPL-MCNC: 8.3 MG/DL (ref 8.7–10.5)
CHLORIDE SERPL-SCNC: 111 MMOL/L (ref 95–110)
CO2 SERPL-SCNC: 19 MMOL/L (ref 23–29)
CREAT SERPL-MCNC: 1 MG/DL (ref 0.5–1.4)
DIFFERENTIAL METHOD: ABNORMAL
EOSINOPHIL # BLD AUTO: 0.5 K/UL (ref 0–0.5)
EOSINOPHIL NFR BLD: 10.1 % (ref 0–8)
ERYTHROCYTE [DISTWIDTH] IN BLOOD BY AUTOMATED COUNT: 24.8 % (ref 11.5–14.5)
EST. GFR  (NO RACE VARIABLE): >60 ML/MIN/1.73 M^2
GLUCOSE SERPL-MCNC: 80 MG/DL (ref 70–110)
HCT VFR BLD AUTO: 23.9 % (ref 40–54)
HGB BLD-MCNC: 7 G/DL (ref 14–18)
IMM GRANULOCYTES # BLD AUTO: 0.01 K/UL (ref 0–0.04)
IMM GRANULOCYTES NFR BLD AUTO: 0.2 % (ref 0–0.5)
LYMPHOCYTES # BLD AUTO: 0.8 K/UL (ref 1–4.8)
LYMPHOCYTES NFR BLD: 16.5 % (ref 18–48)
MCH RBC QN AUTO: 21.6 PG (ref 27–31)
MCHC RBC AUTO-ENTMCNC: 29.3 G/DL (ref 32–36)
MCV RBC AUTO: 74 FL (ref 82–98)
MONOCYTES # BLD AUTO: 0.5 K/UL (ref 0.3–1)
MONOCYTES NFR BLD: 9.7 % (ref 4–15)
NEUTROPHILS # BLD AUTO: 3 K/UL (ref 1.8–7.7)
NEUTROPHILS NFR BLD: 62.5 % (ref 38–73)
NRBC BLD-RTO: 1 /100 WBC
PLATELET # BLD AUTO: 147 K/UL (ref 150–450)
PMV BLD AUTO: 10.5 FL (ref 9.2–12.9)
POCT GLUCOSE: 120 MG/DL (ref 70–110)
POTASSIUM SERPL-SCNC: 4 MMOL/L (ref 3.5–5.1)
PROT SERPL-MCNC: 5.1 G/DL (ref 6–8.4)
RBC # BLD AUTO: 3.24 M/UL (ref 4.6–6.2)
SODIUM SERPL-SCNC: 139 MMOL/L (ref 136–145)
WBC # BLD AUTO: 4.85 K/UL (ref 3.9–12.7)

## 2023-05-18 PROCEDURE — 11000001 HC ACUTE MED/SURG PRIVATE ROOM

## 2023-05-18 PROCEDURE — 63600175 PHARM REV CODE 636 W HCPCS: Performed by: STUDENT IN AN ORGANIZED HEALTH CARE EDUCATION/TRAINING PROGRAM

## 2023-05-18 PROCEDURE — 36415 COLL VENOUS BLD VENIPUNCTURE: CPT | Performed by: INTERNAL MEDICINE

## 2023-05-18 PROCEDURE — 25000003 PHARM REV CODE 250: Performed by: HOSPITALIST

## 2023-05-18 PROCEDURE — 63600175 PHARM REV CODE 636 W HCPCS: Performed by: HOSPITALIST

## 2023-05-18 PROCEDURE — 25000003 PHARM REV CODE 250: Performed by: STUDENT IN AN ORGANIZED HEALTH CARE EDUCATION/TRAINING PROGRAM

## 2023-05-18 PROCEDURE — 85025 COMPLETE CBC W/AUTO DIFF WBC: CPT | Performed by: HOSPITALIST

## 2023-05-18 PROCEDURE — C9113 INJ PANTOPRAZOLE SODIUM, VIA: HCPCS | Performed by: HOSPITALIST

## 2023-05-18 PROCEDURE — 80053 COMPREHEN METABOLIC PANEL: CPT | Performed by: INTERNAL MEDICINE

## 2023-05-18 RX ADMIN — SUCRALFATE 1 G: 1 TABLET ORAL at 08:05

## 2023-05-18 RX ADMIN — PANTOPRAZOLE SODIUM 40 MG: 40 INJECTION, POWDER, LYOPHILIZED, FOR SOLUTION INTRAVENOUS at 08:05

## 2023-05-18 RX ADMIN — SUCRALFATE 1 G: 1 TABLET ORAL at 09:05

## 2023-05-18 RX ADMIN — IRON SUCROSE 100 MG: 20 INJECTION, SOLUTION INTRAVENOUS at 03:05

## 2023-05-18 RX ADMIN — PANTOPRAZOLE SODIUM 40 MG: 40 INJECTION, POWDER, LYOPHILIZED, FOR SOLUTION INTRAVENOUS at 09:05

## 2023-05-18 NOTE — SUBJECTIVE & OBJECTIVE
Interval History:  He did not want to speak to anybody this morning and requested to see him after 12 PM.  Attempted to see patient in when he was more agreeable to talk.  Denies melena.  Overall feels well.  Hemoglobin slowly downtrending.    Review of Systems   Constitutional:  Positive for fatigue.   Objective:     Vital Signs (Most Recent):  Temp: 96.6 °F (35.9 °C) (05/18/23 1200)  Pulse: (!) 58 (05/18/23 1200)  Resp: 18 (05/18/23 1200)  BP: (!) 125/58 (05/18/23 1200)  SpO2: 95 % (05/18/23 1200) Vital Signs (24h Range):  Temp:  [96.2 °F (35.7 °C)-97.5 °F (36.4 °C)] 96.6 °F (35.9 °C)  Pulse:  [44-67] 58  Resp:  [14-21] 18  SpO2:  [94 %-100 %] 95 %  BP: (107-134)/(48-98) 125/58     Weight: 68.7 kg (151 lb 7.3 oz)  Body mass index is 21.73 kg/m² (pended).    Intake/Output Summary (Last 24 hours) at 5/18/2023 1456  Last data filed at 5/18/2023 0902  Gross per 24 hour   Intake 1341.88 ml   Output --   Net 1341.88 ml           Physical Exam  HENT:      Mouth/Throat:      Mouth: Mucous membranes are moist.      Pharynx: Oropharynx is clear.   Eyes:      General: No scleral icterus.  Cardiovascular:      Rate and Rhythm: Normal rate.      Pulses: Normal pulses.   Pulmonary:      Effort: Pulmonary effort is normal. No respiratory distress.   Abdominal:      General: There is no distension.      Palpations: Abdomen is soft.      Tenderness: There is no abdominal tenderness.   Skin:     Coloration: Skin is not jaundiced.      Findings: No bruising.   Neurological:      General: No focal deficit present.      Mental Status: He is alert. Mental status is at baseline.           Significant Labs: All pertinent labs within the past 24 hours have been reviewed.    Significant Imaging: I have reviewed all pertinent imaging results/findings within the past 24 hours.

## 2023-05-18 NOTE — PLAN OF CARE
Problem: Adult Inpatient Plan of Care  Goal: Plan of Care Review  Outcome: Ongoing, Progressing  Goal: Patient-Specific Goal (Individualized)  Outcome: Ongoing, Progressing  Goal: Optimal Comfort and Wellbeing  Outcome: Ongoing, Progressing  Goal: Readiness for Transition of Care  Outcome: Ongoing, Progressing     Problem: Anemia  Goal: Anemia Symptom Improvement  Outcome: Ongoing, Progressing     Problem: Fall Injury Risk  Goal: Absence of Fall and Fall-Related Injury  Outcome: Ongoing, Progressing     Problem: Skin Injury Risk Increased  Goal: Skin Health and Integrity  Outcome: Ongoing, Progressing

## 2023-05-18 NOTE — ASSESSMENT & PLAN NOTE
Acute anemia severe  Inpatient admit meeting benchmarks  Fe studies consistent with deficiency; given IV iron  -GI consulted - EGD 5/17 : A Billroth I anastomosis was found, characterized by friable mucosa and erythema without bleeding, but some oozing on contact. Biopsied, path pending. Colonoscopy 5/17: poor prep, no obvious bleeding. Repeat recommended in 6 months     IV iron scheduled. Hemoglobin with slow down trend though patient denies any melena    -history of perforated peptic ulcer disease; on IV pantoprazole and should be on PPI chronically. Sucralfate    Will need close OP GI follow up

## 2023-05-18 NOTE — PROGRESS NOTES
[facility] Medicine  Progress Note    Patient Name: Nacho Gusman  MRN: [id number]  Patient Class: IP- Inpatient   Admission Date: 5/15/2023  Length of Stay: 2 days  Attending Physician: Arlene Tavares MD  Primary Care Provider: Esmer Licona MD        Subjective:     Principal Problem:Microcytic anemia    HPI:  Very pleasant 66 y/o M with no significant past medical hx, not on prescription medication presents due to lethargy , pallor, dyspnea and light headedness for 2 days. HB 3 mg/dl in ER. Patient denied chest pain bleeding from any orifice, not on anticoagulation no GI work up previously or hx of hematologic disorder      Overview/Hospital Course:  No notes on file    Interval History:  He did not want to speak to anybody this morning and requested to see him after 12 PM.  Attempted to see patient in when he was more agreeable to talk.  Denies melena.  Overall feels well.  Hemoglobin slowly downtrending.    Review of Systems   Constitutional:  Positive for fatigue.   Objective:     Vital Signs (Most Recent):  Temp: 96.6 °F (35.9 °C) (05/18/23 1200)  Pulse: (!) 58 (05/18/23 1200)  Resp: 18 (05/18/23 1200)  BP: (!) 125/58 (05/18/23 1200)  SpO2: 95 % (05/18/23 1200) Vital Signs (24h Range):  Temp:  [96.2 °F (35.7 °C)-97.5 °F (36.4 °C)] 96.6 °F (35.9 °C)  Pulse:  [44-67] 58  Resp:  [14-21] 18  SpO2:  [94 %-100 %] 95 %  BP: (107-134)/(48-98) 125/58     Weight: 68.7 kg (151 lb 7.3 oz)  Body mass index is 21.73 kg/m² (pended).    Intake/Output Summary (Last 24 hours) at 5/18/2023 1456  Last data filed at 5/18/2023 0902  Gross per 24 hour   Intake 1341.88 ml   Output --   Net 1341.88 ml           Physical Exam  HENT:      Mouth/Throat:      Mouth: Mucous membranes are moist.      Pharynx: Oropharynx is clear.   Eyes:      General: No scleral icterus.  Cardiovascular:      Rate and Rhythm: Normal rate.      Pulses: Normal pulses.   Pulmonary:      Effort: Pulmonary effort is normal. No respiratory  distress.   Abdominal:      General: There is no distension.      Palpations: Abdomen is soft.      Tenderness: There is no abdominal tenderness.   Skin:     Coloration: Skin is not jaundiced.      Findings: No bruising.   Neurological:      General: No focal deficit present.      Mental Status: He is alert. Mental status is at baseline.           Significant Labs: All pertinent labs within the past 24 hours have been reviewed.    Significant Imaging: I have reviewed all pertinent imaging results/findings within the past 24 hours.      Assessment/Plan:      * Microcytic anemia  Acute anemia severe  Inpatient admit meeting benchmarks  Fe studies consistent with deficiency; given IV iron  -GI consulted - EGD 5/17 : A Billroth I anastomosis was found, characterized by friable mucosa and erythema without bleeding, but some oozing on contact. Biopsied, path pending. Colonoscopy 5/17: poor prep, no obvious bleeding. Repeat recommended in 6 months     IV iron scheduled. Hemoglobin with slow down trend though patient denies any melena    -history of perforated peptic ulcer disease; on IV pantoprazole and should be on PPI chronically. Sucralfate    Will need close OP GI follow up    Pleural effusion  -noted on admission CT chest abdomen pelvis  -given dose of IV Lasix at admission; may benefit from further workup of this and lung changes in outpatient setting      Hypoalbuminemia  -mild and noted on admission      History of peptic ulcer disease  -with perforation   -patient should be on chronic PPI      Occult GI bleeding  Has a hx, however patient denied bleeding from any orifice   -GI consulted      Bipolar disorder  Not on antipsychotics      Hypertension  Controlled presently off medications      VTE Risk Mitigation (From admission, onward)         Ordered     IP VTE LOW RISK PATIENT  Once         05/16/23 0123     Place sequential compression device  Until discontinued         05/16/23 0123                Discharge  Planning   LAUREN:      Code Status: Full Code   Is the patient medically ready for discharge?:     Reason for patient still in hospital (select all that apply): Patient trending condition and Consult recommendations  Discharge Plan A: Home          Arlene Tavares MD  Department of [facility] Medicine   [facility]

## 2023-05-19 ENCOUNTER — PATIENT OUTREACH (OUTPATIENT)
Dept: ADMINISTRATIVE | Facility: OTHER | Age: 68
End: 2023-05-19
Payer: MEDICARE

## 2023-05-19 VITALS
HEIGHT: 70 IN | WEIGHT: 151.44 LBS | OXYGEN SATURATION: 96 % | TEMPERATURE: 98 F | RESPIRATION RATE: 18 BRPM | BODY MASS INDEX: 21.68 KG/M2 | SYSTOLIC BLOOD PRESSURE: 118 MMHG | DIASTOLIC BLOOD PRESSURE: 68 MMHG | HEART RATE: 60 BPM

## 2023-05-19 LAB
HCT VFR BLD AUTO: 24.4 % (ref 40–54)
HGB BLD-MCNC: 7.2 G/DL (ref 14–18)

## 2023-05-19 PROCEDURE — 25000003 PHARM REV CODE 250: Performed by: HOSPITALIST

## 2023-05-19 PROCEDURE — 85014 HEMATOCRIT: CPT | Performed by: STUDENT IN AN ORGANIZED HEALTH CARE EDUCATION/TRAINING PROGRAM

## 2023-05-19 PROCEDURE — C9113 INJ PANTOPRAZOLE SODIUM, VIA: HCPCS | Performed by: HOSPITALIST

## 2023-05-19 PROCEDURE — 63600175 PHARM REV CODE 636 W HCPCS: Performed by: STUDENT IN AN ORGANIZED HEALTH CARE EDUCATION/TRAINING PROGRAM

## 2023-05-19 PROCEDURE — 36415 COLL VENOUS BLD VENIPUNCTURE: CPT | Performed by: STUDENT IN AN ORGANIZED HEALTH CARE EDUCATION/TRAINING PROGRAM

## 2023-05-19 PROCEDURE — 85018 HEMOGLOBIN: CPT | Performed by: STUDENT IN AN ORGANIZED HEALTH CARE EDUCATION/TRAINING PROGRAM

## 2023-05-19 PROCEDURE — 63600175 PHARM REV CODE 636 W HCPCS: Performed by: HOSPITALIST

## 2023-05-19 PROCEDURE — 25000003 PHARM REV CODE 250: Performed by: STUDENT IN AN ORGANIZED HEALTH CARE EDUCATION/TRAINING PROGRAM

## 2023-05-19 RX ORDER — PANTOPRAZOLE SODIUM 40 MG/1
40 TABLET, DELAYED RELEASE ORAL DAILY
Qty: 60 TABLET | Refills: 3 | Status: SHIPPED | OUTPATIENT
Start: 2023-05-19 | End: 2023-07-24 | Stop reason: SDUPTHER

## 2023-05-19 RX ORDER — FERROUS SULFATE 325(65) MG
325 TABLET ORAL
Qty: 60 TABLET | Refills: 2 | Status: SHIPPED | OUTPATIENT
Start: 2023-05-19

## 2023-05-19 RX ORDER — SUCRALFATE 1 G/1
1 TABLET ORAL 2 TIMES DAILY
Qty: 120 TABLET | Refills: 2 | Status: SHIPPED | OUTPATIENT
Start: 2023-05-19

## 2023-05-19 RX ADMIN — SUCRALFATE 1 G: 1 TABLET ORAL at 09:05

## 2023-05-19 RX ADMIN — PANTOPRAZOLE SODIUM 40 MG: 40 INJECTION, POWDER, LYOPHILIZED, FOR SOLUTION INTRAVENOUS at 09:05

## 2023-05-19 RX ADMIN — IRON SUCROSE 100 MG: 20 INJECTION, SOLUTION INTRAVENOUS at 09:05

## 2023-05-19 NOTE — PROGRESS NOTES
IP Liaison - Initial Visit Note    Patient: Nacho Gusman  MRN:  [id number]  Date of Service:  5/19/2023  Completed by:  NIR Toro    Reason for Visit   Patient presents with    IP Liaison Initial Visit       RSW met with patient at bedside in order to complete SDOH questionnaire and liaison assessment. Pt has identified no social barriers to care. Pt declined the need for resources at this time.    The following were addressed during this visit:  - Review SDOH Questions   - Complete patient assessment   - Complete initial visit with patient        Patient Summary     IP Liaison Patient Assessment    General  Level of Caregiver support: Member independent and does not need caregiver assistance  Have you had to make a decision between paying for any of the following in the last 2 months?: None  Employment status: Disabled  Assessments  Was the PHQ Depression Screening completed this visit?: No  Was the VILMA-7 Screening completed this visit?: No       NIR Toro

## 2023-05-19 NOTE — PROGRESS NOTES
Reached out to pt by room phone and [employer] connect to obtain IMM, no answer will try again later.

## 2023-05-19 NOTE — PLAN OF CARE
Discharge orders noted. AVS prepared with medication list, importance of medication compliance, follow up appointments, diet, home care instructions, treatment plan, self management, and when to seek medical attention. Detailed clinical reference list attached. AVS printed and handed to patient by bedside nurse. VN reviewed discharge instructions with patient using teachback method.  Allowed time for questions, all questions answered.  Patient verbalized complete understanding of discharge instructions and voices no concerns.      Discharge instructions complete.  Bedside delivery complete, bedside nurse with pt.   Bedside nurse notified.

## 2023-05-19 NOTE — PLAN OF CARE
Pt will dc with no needs noted. Pt reports that his car is outside and he will transport himself home. SW will continue to follow pt throughout his transitions of care and assist with any dc needs.     Cleared from CM . Bedside Nurse and VN notified.    SW requested GI follow up  SW requested PCC follow up    Future Appointments   Date Time Provider Department Center   6/5/2023  1:30 PM Campos Saha DO [facility]        05/19/23 0853   Final Note   Assessment Type Final Discharge Note   Anticipated Discharge Disposition Home   Hospital Resources/Appts/Education Provided Appointments scheduled by Navigator/Coordinator   Post-Acute Status   Discharge Delays None known at this time

## 2023-05-20 LAB — POCT GLUCOSE: 100 MG/DL (ref 70–110)

## 2023-05-21 NOTE — HOSPITAL COURSE
67-year-old male with PMH of bipolar disorder, perforated peptic ulcer disease s/p surgery presented to the ER with lethargy, lightheadedness and found to have hemoglobin of 3.5 for which he received multiple transfusions, IV iron.  Remained hemodynamically stable throughout the hospital stay.  Iron deficiency anemia thought to be in setting of slow upper GI bleed.  Underwent EGD on 05/17 that showed Billroth-I anastomosis, characterized by friable mucosa and erythema without bleeding with some oozing.  This area was biopsied, pathology pending at the time of discharge.  Colonoscopy 5/17 with poor prep no obvious bleeding noted.  Discharged on PPI, sucralfate iron supplements with GI follow-up.    Physical Exam  HENT:      Mouth/Throat:      Mouth: Mucous membranes are moist.      Pharynx: Oropharynx is clear.   Eyes:      General: No scleral icterus.  Cardiovascular:      Rate and Rhythm: Normal rate.      Pulses: Normal pulses.   Pulmonary:      Effort: Pulmonary effort is normal. No respiratory distress.   Abdominal:      General: There is no distension.      Palpations: Abdomen is soft.      Tenderness: There is no abdominal tenderness.   Skin:     Coloration: Skin is not jaundiced.      Findings: No bruising.   Neurological:      General: No focal deficit present.      Mental Status: He is alert. Mental status is at baseline.

## 2023-05-21 NOTE — DISCHARGE SUMMARY
[facility] Medicine  Discharge Summary      Patient Name: Nacho Gusmna  MRN: [id number]  SONI: [id number]  Patient Class: IP- Inpatient  Admission Date: 5/15/2023  Hospital Length of Stay: 3 days  Discharge Date and Time: 5/19/2023  1:26 PM  Attending Physician: No att. providers found   Discharging Provider: Arlene Tavares MD  Primary Care Provider: Esmer Licona MD    Primary Care Team: Networked reference to record PCT     HPI:   Very pleasant 68 y/o M with no significant past medical hx, not on prescription medication presents due to lethargy , pallor, dyspnea and light headedness for 2 days. HB 3 mg/dl in ER. Patient denied chest pain bleeding from any orifice, not on anticoagulation no GI work up previously or hx of hematologic disorder      Procedure(s) (LRB):  EGD (ESOPHAGOGASTRODUODENOSCOPY) (N/A)  COLONOSCOPY (N/A)      Hospital Course:   67-year-old male with PMH of bipolar disorder, perforated peptic ulcer disease s/p surgery presented to the ER with lethargy, lightheadedness and found to have hemoglobin of 3.5 for which he received multiple transfusions, IV iron.  Remained hemodynamically stable throughout the hospital stay.  Iron deficiency anemia thought to be in setting of slow upper GI bleed.  Underwent EGD on 05/17 that showed Billroth-I anastomosis, characterized by friable mucosa and erythema without bleeding with some oozing.  This area was biopsied, pathology pending at the time of discharge.  Colonoscopy 5/17 with poor prep no obvious bleeding noted.  Discharged on PPI, sucralfate iron supplements with GI follow-up.    Physical Exam  HENT:      Mouth/Throat:      Mouth: Mucous membranes are moist.      Pharynx: Oropharynx is clear.   Eyes:      General: No scleral icterus.  Cardiovascular:      Rate and Rhythm: Normal rate.      Pulses: Normal pulses.   Pulmonary:      Effort: Pulmonary effort is normal. No respiratory distress.   Abdominal:      General: There is no  distension.      Palpations: Abdomen is soft.      Tenderness: There is no abdominal tenderness.   Skin:     Coloration: Skin is not jaundiced.      Findings: No bruising.   Neurological:      General: No focal deficit present.      Mental Status: He is alert. Mental status is at baseline.        Goals of Care Treatment Preferences:  Code Status: Full Code      Consults:   Consults (From admission, onward)        Status Ordering Provider     Inpatient consult to Gastroenterology-[facility]  Once        Provider:  (Not yet assigned)    PETE Lopez            Final Active Diagnoses:    Diagnosis Date Noted POA    PRINCIPAL PROBLEM:  Microcytic anemia [D50.9] 01/21/2014 Yes    History of peptic ulcer disease [Z87.11] 05/17/2023 Not Applicable    Hypoalbuminemia [E88.09] 05/17/2023 Yes    Pleural effusion [J90] 05/17/2023 Yes    Occult GI bleeding [R19.5] 02/13/2015 Yes    Bipolar disorder [F31.9] 01/30/2014 Yes     Chronic    Hypertension [I10] 01/02/2014 Yes      Problems Resolved During this Admission:       Discharged Condition: stable    Disposition: Home or Self Care    Follow Up:   Follow-up Information     Scheduling Navigators Follow up.    Why: Scheduling Navigators will contact patient of future follow up appointments.                     Patient Instructions:      Ambulatory referral/consult to Gastroenterology   Standing Status: Future   Referral Priority: Routine Referral Type: Consultation   Referral Reason: Specialty Services Required   Requested Specialty: Gastroenterology   Number of Visits Requested: 1       Significant Diagnostic Studies: Labs: BMP: No results for input(s): GLU, NA, K, CL, CO2, BUN, CREATININE, CALCIUM, MG in the last 48 hours. and CBC No results for input(s): WBC, HGB, HCT, PLT in the last 48 hours.      Pending Diagnostic Studies:     Procedure Component Value Units Date/Time    Specimen to Pathology, Surgery Gastrointestinal tract [[id number]] Collected:  05/17/23 1622    Order Status: Sent Lab Status: In process Updated: 05/18/23 1049    Specimen: Tissue          Medications:  Reconciled Home Medications:      Medication List      START taking these medications    FeroSuL 325 mg (65 mg iron) Tab tablet  Generic drug: ferrous sulfate  Take 1 tablet (325 mg total) by mouth daily with breakfast.     pantoprazole 40 MG tablet  Commonly known as: PROTONIX  Take 1 tablet (40 mg total) by mouth once daily.     sucralfate 1 gram tablet  Commonly known as: CARAFATE  Take 1 tablet (1 g total) by mouth 2 (two) times daily.        CONTINUE taking these medications    co-enzyme Q-10 30 mg capsule  Take 30 mg by mouth once daily.     folic acid 400 MCG tablet  Commonly known as: FOLVITE  Take 400 mcg by mouth once daily.     magnesium oxide 400 mg (241.3 mg magnesium) tablet  Commonly known as: MAG-OX  Take 400 mg by mouth once daily.     omega 3-dha-epa-fish oil 1,200 (144-216) mg Cap  Take 1,200 mg by mouth 2 (two) times daily.     VITAMIN C 500 MG tablet  Generic drug: ascorbic acid (vitamin C)  Take 500 mg by mouth once daily.     vitamin E 400 UNIT capsule  Take 400 Units by mouth once daily.            Indwelling Lines/Drains at time of discharge:   Lines/Drains/Airways     None                 Time spent on the discharge of patient: 38 minutes         Arlene Tavares MD  Department of Hospital Medicine  [facility]

## 2023-05-23 ENCOUNTER — TELEPHONE (OUTPATIENT)
Dept: GASTROENTEROLOGY | Facility: CLINIC | Age: 68
End: 2023-05-23
Payer: MEDICARE

## 2023-05-23 ENCOUNTER — PATIENT OUTREACH (OUTPATIENT)
Dept: ADMINISTRATIVE | Facility: OTHER | Age: 68
End: 2023-05-23
Payer: MEDICARE

## 2023-05-23 LAB
FINAL PATHOLOGIC DIAGNOSIS: NORMAL
GROSS: NORMAL
Lab: NORMAL
MICROSCOPIC EXAM: NORMAL

## 2023-05-23 NOTE — PROGRESS NOTES
IP Liaison - Final Visit Note    Patient: Nacho Gusman  MRN:  [id number]  Date of Service:  5/23/2023  Completed by:  NIR Toro    Reason for Visit   Patient presents with    IP Liaison Chart Review     Patient discharged from hospital before NIR was able to complete follow-up visit.          Patient Summary     Discharge Date: 05/19/2023  Discharge telephone number/address: [phone] / [address] [facility] [address]  Follow up provider: Campos Saha,   Follow up appointments: 6/5/2023 @ 1:30pm  Home Health agency & telephone number: n/a  DME ordered &  name: n/a  Assigned OPCM RN/SW: n/a  Report sent to follow up team (PCP/OPCM) via in basket message: n/a  Community Resources arranged including agency name & contact info: n/a      NIR Toro

## 2023-05-23 NOTE — TELEPHONE ENCOUNTER
----- Message from Alberto Alfonso MD sent at 5/23/2023 10:35 AM CDT -----  Please inform  Small intestine biopsies normal  He needs colonosocpy in about 6 months because of poor prep  He should follow up with primary care to get set up for IV iron

## 2023-05-31 ENCOUNTER — TELEPHONE (OUTPATIENT)
Dept: GASTROENTEROLOGY | Facility: CLINIC | Age: 68
End: 2023-05-31
Payer: MEDICARE

## 2023-06-05 ENCOUNTER — OFFICE VISIT (OUTPATIENT)
Dept: INTERNAL MEDICINE | Facility: CLINIC | Age: 68
End: 2023-06-05
Payer: MEDICARE

## 2023-06-05 VITALS
HEIGHT: 70 IN | OXYGEN SATURATION: 98 % | RESPIRATION RATE: 16 BRPM | BODY MASS INDEX: 21.47 KG/M2 | HEART RATE: 65 BPM | SYSTOLIC BLOOD PRESSURE: 102 MMHG | WEIGHT: 149.94 LBS | TEMPERATURE: 98 F | DIASTOLIC BLOOD PRESSURE: 60 MMHG

## 2023-06-05 DIAGNOSIS — D50.9 MICROCYTIC ANEMIA: ICD-10-CM

## 2023-06-05 DIAGNOSIS — D69.6 THROMBOCYTOPENIA, UNSPECIFIED: ICD-10-CM

## 2023-06-05 DIAGNOSIS — Z01.30 BLOOD PRESSURE CHECK: Primary | ICD-10-CM

## 2023-06-05 PROCEDURE — 1101F PR PT FALLS ASSESS DOC 0-1 FALLS W/OUT INJ PAST YR: ICD-10-PCS | Mod: CPTII,GC,S$GLB,

## 2023-06-05 PROCEDURE — 1111F DSCHRG MED/CURRENT MED MERGE: CPT | Mod: CPTII,GC,S$GLB,

## 2023-06-05 PROCEDURE — 1159F MED LIST DOCD IN RCRD: CPT | Mod: CPTII,GC,S$GLB,

## 2023-06-05 PROCEDURE — 1101F PT FALLS ASSESS-DOCD LE1/YR: CPT | Mod: CPTII,GC,S$GLB,

## 2023-06-05 PROCEDURE — 99999 PR PBB SHADOW E&M-EST. PATIENT-LVL III: ICD-10-PCS | Mod: PBBFAC,GC,,

## 2023-06-05 PROCEDURE — 99214 OFFICE O/P EST MOD 30 MIN: CPT | Mod: GC,S$GLB,,

## 2023-06-05 PROCEDURE — 3078F DIAST BP <80 MM HG: CPT | Mod: CPTII,GC,S$GLB,

## 2023-06-05 PROCEDURE — 3008F BODY MASS INDEX DOCD: CPT | Mod: CPTII,GC,S$GLB,

## 2023-06-05 PROCEDURE — 1159F PR MEDICATION LIST DOCUMENTED IN MEDICAL RECORD: ICD-10-PCS | Mod: CPTII,GC,S$GLB,

## 2023-06-05 PROCEDURE — 1126F PR PAIN SEVERITY QUANTIFIED, NO PAIN PRESENT: ICD-10-PCS | Mod: CPTII,GC,S$GLB,

## 2023-06-05 PROCEDURE — 3074F PR MOST RECENT SYSTOLIC BLOOD PRESSURE < 130 MM HG: ICD-10-PCS | Mod: CPTII,GC,S$GLB,

## 2023-06-05 PROCEDURE — 1111F PR DISCHARGE MEDS RECONCILED W/ CURRENT OUTPATIENT MED LIST: ICD-10-PCS | Mod: CPTII,GC,S$GLB,

## 2023-06-05 PROCEDURE — 3078F PR MOST RECENT DIASTOLIC BLOOD PRESSURE < 80 MM HG: ICD-10-PCS | Mod: CPTII,GC,S$GLB,

## 2023-06-05 PROCEDURE — 1126F AMNT PAIN NOTED NONE PRSNT: CPT | Mod: CPTII,GC,S$GLB,

## 2023-06-05 PROCEDURE — 99214 PR OFFICE/OUTPT VISIT, EST, LEVL IV, 30-39 MIN: ICD-10-PCS | Mod: GC,S$GLB,,

## 2023-06-05 PROCEDURE — 3074F SYST BP LT 130 MM HG: CPT | Mod: CPTII,GC,S$GLB,

## 2023-06-05 PROCEDURE — 3288F FALL RISK ASSESSMENT DOCD: CPT | Mod: CPTII,GC,S$GLB,

## 2023-06-05 PROCEDURE — 99999 PR PBB SHADOW E&M-EST. PATIENT-LVL III: CPT | Mod: PBBFAC,GC,,

## 2023-06-05 PROCEDURE — 3288F PR FALLS RISK ASSESSMENT DOCUMENTED: ICD-10-PCS | Mod: CPTII,GC,S$GLB,

## 2023-06-05 PROCEDURE — 3008F PR BODY MASS INDEX (BMI) DOCUMENTED: ICD-10-PCS | Mod: CPTII,GC,S$GLB,

## 2023-06-05 NOTE — PROGRESS NOTES
Clinic Note  6/5/2023      Subjective:       Patient ID:  patient is a 68 y.o. male being seen for an urgent care visit    Chief Complaint: Blood Pressure Check    HPI  Mr Gusman is a 68 year old male with microcytic anemia, recent admission for acute blood loss anemia and history of HTN who presents to the clinic today for a BP check. Patient states his optometrist told him to get his BP checked due to a high reading. He is unable to recall what his BP was. Has history of HTN for which he used to take lisinopril. Has not taken lisinopril for several years. Does not measure BP at home. Denies HA, vision changes, weakness, fatigue, SOB, CP, abdominal pain, hematuria, melana or bloody bowel movements. Of note, discharged from [facility] on 5/19 for anemia (Hg 3.2). Underwent EGD which showed friable mucosa and erythema w/o bleeding with some oozing. Colonscopy w/ poor prep but no obvious bleeding. Received transfusion and discharged w/ PPI. Referral placed to outpatient GI.       Review of Systems   Constitutional:  Negative for diaphoresis, fever and weight loss.   HENT:  Negative for congestion and sore throat.    Respiratory:  Negative for cough and shortness of breath.    Cardiovascular:  Negative for chest pain and leg swelling.   Gastrointestinal:  Negative for diarrhea, nausea and vomiting.   Genitourinary:  Negative for hematuria and urgency.   Musculoskeletal:  Negative for joint pain, myalgias and neck pain.   Neurological:  Negative for dizziness and focal weakness.     Medication List with Changes/Refills   Current Medications    ASCORBIC ACID, VITAMIN C, (VITAMIN C) 500 MG TABLET    Take 500 mg by mouth once daily.    CO-ENZYME Q-10 30 MG CAPSULE    Take 30 mg by mouth once daily.    FERROUS SULFATE (IRON) 325 MG (65 MG IRON) TAB TABLET    Take 1 tablet (325 mg total) by mouth daily with breakfast.    FOLIC ACID (FOLVITE) 400 MCG TABLET    Take 400 mcg by mouth once daily.    MAGNESIUM OXIDE (MAG-OX) 400 MG  "(241.3 MG MAGNESIUM) TABLET    Take 400 mg by mouth once daily.    OMEGA 3-DHA-EPA-FISH OIL 1,200 (144-216) MG CAP    Take 1,200 mg by mouth 2 (two) times daily.    PANTOPRAZOLE (PROTONIX) 40 MG TABLET    Take 1 tablet (40 mg total) by mouth once daily.    SUCRALFATE (CARAFATE) 1 GRAM TABLET    Take 1 tablet (1 g total) by mouth 2 (two) times daily.    VITAMIN E 400 UNIT CAPSULE    Take 400 Units by mouth once daily.       Patient Active Problem List   Diagnosis    Hypertension    Depression    Erectile dysfunction    Microcytic anemia    Elevated fasting blood sugar    Bipolar disorder    Occult GI bleeding    History of peptic ulcer disease    Hypoalbuminemia    Pleural effusion             Health Maintenance Topics with due status: Not Due       Topic Last Completion Date    Lipid Panel 10/05/2021    Colorectal Cancer Screening 05/17/2023       Objective:      /60 (BP Location: Right arm, Patient Position: Sitting, BP Method: Medium (Manual))   Pulse 65   Temp 98.1 °F (36.7 °C) (Temporal)   Resp 16   Ht 5' 10" (1.778 m)   Wt 68 kg (149 lb 14.6 oz)   SpO2 98%   BMI 21.51 kg/m²   Estimated body mass index is 21.51 kg/m² as calculated from the following:    Height as of this encounter: 5' 10" (1.778 m).    Weight as of this encounter: 68 kg (149 lb 14.6 oz).  Physical Exam  Constitutional:       Appearance: Normal appearance.      Comments: Appears disheveled w/ food stained clothing    HENT:      Head: Normocephalic and atraumatic.      Right Ear: External ear normal.      Left Ear: External ear normal.      Mouth/Throat:      Mouth: Mucous membranes are moist.      Pharynx: Oropharynx is clear.   Eyes:      Extraocular Movements: Extraocular movements intact.      Conjunctiva/sclera: Conjunctivae normal.   Cardiovascular:      Rate and Rhythm: Normal rate and regular rhythm.      Heart sounds: Normal heart sounds. No murmur heard.  Pulmonary:      Effort: Pulmonary effort is normal.      Breath " sounds: Normal breath sounds.   Abdominal:      General: Abdomen is flat. Bowel sounds are normal.      Palpations: Abdomen is soft.   Skin:     General: Skin is warm and dry.      Capillary Refill: Capillary refill takes less than 2 seconds.   Neurological:      General: No focal deficit present.      Mental Status: He is alert and oriented to person, place, and time.   Psychiatric:      Comments: Flat affect         Assessment and Plan:     Blood pressure check   BP measured twice in the office ~15 minutes apart. Both readings 100-110s/60-65. Patient likely had an episode of white coat hypertension. Patient would like to establish care w/ a primary care physician in [place]. Advised patient to go to nearest ED if he starts experiencing SOB, HA, palpations, weakness, etc. RTC PRN.     Microcytic anemia   Possibly contributing to normotension. Last Hgb before discharge stable ~7. On PPI. Patient was referred to outpatient GI but no appointment scheduled at this time. Gave patient [facility]'s number.     Thrombocytopenia  Noted on recent lab.    Time spent today: > 30 minutes on H&P, MRR, and MDM        Follow Up:   No follow-ups on file.        Krista Ricketts M.D.  Internal Medicine PGY-2  [facility]

## 2023-06-05 NOTE — PROGRESS NOTES
I have interviewed and examined the patient w/ the resident,   I agree w/ the impression and plan as outlined above.      Bernarda Godwin MD- Staff

## 2023-07-24 ENCOUNTER — OFFICE VISIT (OUTPATIENT)
Dept: GASTROENTEROLOGY | Facility: CLINIC | Age: 68
End: 2023-07-24
Payer: MEDICARE

## 2023-07-24 VITALS — WEIGHT: 149.25 LBS | HEIGHT: 70 IN | BODY MASS INDEX: 21.37 KG/M2

## 2023-07-24 DIAGNOSIS — Z87.11 HISTORY OF PEPTIC ULCER DISEASE: ICD-10-CM

## 2023-07-24 DIAGNOSIS — D64.9 ANEMIA, UNSPECIFIED TYPE: ICD-10-CM

## 2023-07-24 DIAGNOSIS — Z76.89 ENCOUNTER TO ESTABLISH CARE: Primary | ICD-10-CM

## 2023-07-24 PROCEDURE — 99214 OFFICE O/P EST MOD 30 MIN: CPT | Mod: S$GLB,,, | Performed by: NURSE PRACTITIONER

## 2023-07-24 PROCEDURE — 3008F BODY MASS INDEX DOCD: CPT | Mod: CPTII,S$GLB,, | Performed by: NURSE PRACTITIONER

## 2023-07-24 PROCEDURE — 3288F PR FALLS RISK ASSESSMENT DOCUMENTED: ICD-10-PCS | Mod: CPTII,S$GLB,, | Performed by: NURSE PRACTITIONER

## 2023-07-24 PROCEDURE — 1159F MED LIST DOCD IN RCRD: CPT | Mod: CPTII,S$GLB,, | Performed by: NURSE PRACTITIONER

## 2023-07-24 PROCEDURE — 99999 PR PBB SHADOW E&M-EST. PATIENT-LVL III: ICD-10-PCS | Mod: PBBFAC,,, | Performed by: NURSE PRACTITIONER

## 2023-07-24 PROCEDURE — 99214 PR OFFICE/OUTPT VISIT, EST, LEVL IV, 30-39 MIN: ICD-10-PCS | Mod: S$GLB,,, | Performed by: NURSE PRACTITIONER

## 2023-07-24 PROCEDURE — 1159F PR MEDICATION LIST DOCUMENTED IN MEDICAL RECORD: ICD-10-PCS | Mod: CPTII,S$GLB,, | Performed by: NURSE PRACTITIONER

## 2023-07-24 PROCEDURE — 99999 PR PBB SHADOW E&M-EST. PATIENT-LVL III: CPT | Mod: PBBFAC,,, | Performed by: NURSE PRACTITIONER

## 2023-07-24 PROCEDURE — 1126F AMNT PAIN NOTED NONE PRSNT: CPT | Mod: CPTII,S$GLB,, | Performed by: NURSE PRACTITIONER

## 2023-07-24 PROCEDURE — 3288F FALL RISK ASSESSMENT DOCD: CPT | Mod: CPTII,S$GLB,, | Performed by: NURSE PRACTITIONER

## 2023-07-24 PROCEDURE — 1101F PR PT FALLS ASSESS DOC 0-1 FALLS W/OUT INJ PAST YR: ICD-10-PCS | Mod: CPTII,S$GLB,, | Performed by: NURSE PRACTITIONER

## 2023-07-24 PROCEDURE — 1101F PT FALLS ASSESS-DOCD LE1/YR: CPT | Mod: CPTII,S$GLB,, | Performed by: NURSE PRACTITIONER

## 2023-07-24 PROCEDURE — 1126F PR PAIN SEVERITY QUANTIFIED, NO PAIN PRESENT: ICD-10-PCS | Mod: CPTII,S$GLB,, | Performed by: NURSE PRACTITIONER

## 2023-07-24 PROCEDURE — 3008F PR BODY MASS INDEX (BMI) DOCUMENTED: ICD-10-PCS | Mod: CPTII,S$GLB,, | Performed by: NURSE PRACTITIONER

## 2023-07-24 RX ORDER — PANTOPRAZOLE SODIUM 40 MG/1
40 TABLET, DELAYED RELEASE ORAL DAILY
Qty: 60 TABLET | Refills: 3 | Status: SHIPPED | OUTPATIENT
Start: 2023-07-24

## 2023-07-24 NOTE — PROGRESS NOTES
GASTROENTEROLOGY CLINIC NOTE    Chief Complaint: The primary encounter diagnosis was Encounter to establish care. Diagnoses of History of peptic ulcer disease and Anemia, unspecified type were also pertinent to this visit.  Referring provider/PCP: Esmer Licona MD    Nacho Gusman is a 68 y.o. male who is a new patient to me with a PMH that's significant for peptic ulcer, anemia, and to establish care.  He is here today to establish care for anemia and hospital follow up.   Presented to ER in 5/2023 with c/o lethargy, lightheadedness and found to have hemoglobin of 3.5 for which he received multiple transfusions, IV iron.  Remained hemodynamically stable throughout the hospital stay.  Iron deficiency anemia thought to be in setting of slow upper GI bleed.  Underwent EGD on 05/17 that showed Billroth-I anastomosis, characterized by friable mucosa and erythema without bleeding with some oozing.  This area was biopsied, pathology pending at the time of discharge.  Colonoscopy 5/17 with poor prep no obvious bleeding noted.  Discharged on PPI, sucralfate iron supplements with GI follow-up.    Pathology unrevealing. Recommended repeating colonoscopy in 6 months (11/2023).   He is currently taking Protonix 40mg daily and Carafate BID. He is also taking PO iron daily. Tolerating medications without any adverse side effects.   No obvious signs of blood in stool. Denies SOB, unexplained fatigue, dizziness, changes in appetite, or unexplained weight loss.     Reflux: No  Dysphagia/Odynophagia: No  Nausea/Vomiting: No  Appetite Changes: No  Abdominal Pain: No  Bowel Habits: regular bowel movements. No change in bowel habits.  GI Bleeding: Denies hematochezia, hematemesis, melena, BRBPR, Black/tarry stool, coffee ground emesis  Unexplained Weight Loss: No     NSAIDs: No  Anticoagulation or Antiplatelet: No    History of H.pylori: no  H.pylori Treatment:  Prior Upper Endoscopy: 5/17/2023 with Dr. Alfonso  Impression:             - Normal esophagus.                          - A Billroth I anastomosis was found,                          characterized by friable mucosa and erythema.                          - Normal examined jejunum. Biopsied.                          Billroth 1 with erythema and friable gastric                          mucosa but without bleeding, but some oozing on                          contact. The rest of the exam normal     Recommendation:        - Return patient to [facility] for [facility] care.                          - Resume previous diet.                          - Continue present medications.                          - use protonix daily ; use carafate twice a day ;                          suggest setting up IV iron intermittently                          - Await pathology results.                          - Perform a colonoscopy today.                          - See the other procedure note for documentation                          of additional recommendations.     Pathology:  Small bowel, biopsy:   - Small bowel mucosa without significant histopathologic alteration   - Negative for active inflammation or celiac-like injury   - Negative for dysplasia or malignancy     Prior Colonoscopy: 5/17/2023 with Dr. Alfonso  Impression:            - Preparation of the colon was poor.                          - Stool in the sigmoid colon, in the transverse                          colon, in the ascending colon and in the cecum.                          - The examined portion of the ileum was normal.                          - No specimens collected.                          Todays egd / colon for severe iron def shows                          friable but nonbleeding gastric mucosa.                          Colon poor prep but no obvious lesions with normal                          TI and no old or new blood                          Severe iron def is likely related to gastric                          irritation as  well as poor absorption from s/p                          gastric surgery.                          Would recommend IV iron.     Recommendation:        - Return patient to [facility] for possible                          discharge same day.                          - Advance diet as tolerated.                          - Continue present medications.                          - Repeat colonoscopy in 6 months because the bowel                          preparation was poor.     Family h/o Colon Cancer: No  Family h/o Crohn's Disease or Ulcerative Colitis: No  Family h/o Celiac Sprue: No  Abdominal Surgeries: 1997 perforated gastric ulcer    Review of Systems   Constitutional:  Negative for weight loss.   HENT:  Negative for sore throat.    Eyes:  Negative for blurred vision.   Respiratory:  Negative for cough.    Cardiovascular:  Negative for chest pain.   Gastrointestinal:  Negative for abdominal pain, blood in stool, constipation, diarrhea, heartburn, melena, nausea and vomiting.   Genitourinary:  Negative for dysuria.   Musculoskeletal:  Negative for myalgias.   Skin:  Negative for rash.   Neurological:  Negative for headaches.   Endo/Heme/Allergies:  Negative for environmental allergies.   Psychiatric/Behavioral:  Negative for suicidal ideas. The patient is not nervous/anxious.      Past Medical History: has a past medical history of Depression, Gastric ulcer, Hypertension, and Seizure disorder.    Past Surgical History: has a past surgical history that includes stomach surgery (1997); Esophagogastroduodenoscopy (N/A, 5/17/2023); and Colonoscopy (N/A, 5/17/2023).    Family History:family history includes Hypertension in his brother and father.    Allergies: Review of patient's allergies indicates:  No Known Allergies    Social History: reports that he quit smoking about 26 years ago. His smoking use included cigarettes. He has a 30.00 pack-year smoking history. He does not have any smokeless tobacco history on  "file. He reports that he does not drink alcohol and does not use drugs.    Home medications:   Current Outpatient Medications on File Prior to Visit   Medication Sig Dispense Refill    ascorbic acid, vitamin C, (VITAMIN C) 500 MG tablet Take 500 mg by mouth once daily.      co-enzyme Q-10 30 mg capsule Take 30 mg by mouth once daily.      ferrous sulfate (IRON) 325 mg (65 mg iron) Tab tablet Take 1 tablet (325 mg total) by mouth daily with breakfast. 60 tablet 2    folic acid (FOLVITE) 400 MCG tablet Take 400 mcg by mouth once daily.      magnesium oxide (MAG-OX) 400 mg (241.3 mg magnesium) tablet Take 400 mg by mouth once daily.      omega 3-dha-epa-fish oil 1,200 (144-216) mg Cap Take 1,200 mg by mouth 2 (two) times daily.      sucralfate (CARAFATE) 1 gram tablet Take 1 tablet (1 g total) by mouth 2 (two) times daily. 120 tablet 2    vitamin E 400 UNIT capsule Take 400 Units by mouth once daily.      [DISCONTINUED] pantoprazole (PROTONIX) 40 MG tablet Take 1 tablet (40 mg total) by mouth once daily. 60 tablet 3     No current facility-administered medications on file prior to visit.       Vital signs:  Ht 5' 10" (1.778 m)   Wt 67.7 kg (149 lb 4 oz)   BMI 21.42 kg/m²     Physical Exam  Vitals reviewed.   Constitutional:       Appearance: Normal appearance.   HENT:      Head: Normocephalic.   Pulmonary:      Effort: Pulmonary effort is normal. No respiratory distress.   Abdominal:      General: There is no distension.      Palpations: Abdomen is soft.      Tenderness: There is no abdominal tenderness.   Skin:     General: Skin is warm.   Neurological:      Mental Status: He is alert and oriented to person, place, and time.   Psychiatric:         Behavior: Behavior normal.         Thought Content: Thought content normal.       Routine labs:  Lab Results   Component Value Date    WBC 4.85 05/18/2023    HGB 7.2 (L) 05/19/2023    HCT 24.4 (L) 05/19/2023    MCV 74 (L) 05/18/2023     (L) 05/18/2023     No results " found for: INR  Lab Results   Component Value Date    IRON 116 05/16/2023    FERRITIN 5 (L) 05/16/2023    TIBC 485 (H) 05/16/2023    FESATURATED 24 05/16/2023     Lab Results   Component Value Date     05/18/2023    K 4.0 05/18/2023     (H) 05/18/2023    CO2 19 (L) 05/18/2023    BUN 9 05/18/2023    CREATININE 1.0 05/18/2023     Lab Results   Component Value Date    ALBUMIN 2.9 (L) 05/18/2023    ALT 11 05/18/2023    AST 16 05/18/2023    ALKPHOS 111 05/18/2023    BILITOT 0.7 05/18/2023     No results found for: GLUCOSE  Lab Results   Component Value Date    TSH 1.859 08/02/2018     Lab Results   Component Value Date    CALCIUM 8.3 (L) 05/18/2023       Imaging:   CT Chest Abdomen Pelvis Without Contrast (XPD)  Narrative: EXAMINATION:  CT CHEST ABDOMEN PELVIS WITHOUT CONTRAST(XPD)    CLINICAL HISTORY:  Hematologic malignancy, staging;    TECHNIQUE:  Low dose axial images, sagittal and coronal reformations were obtained from the thoracic inlet to the pubic symphysis intravenous contrast and oral contrast was not utilized, this diminishes the sensitivity of the examination.    COMPARISON:  Chest radiograph May 15, 2023    FINDINGS:  There are small bilateral pleural effusions, left greater than right.  The lungs demonstrate mild motion artifact and atelectatic change.  There are several small micro nodules noted at the right upper lobe superiorly peripherally, as well as the left upper lobe anteriorly with mild ground-glass infiltrate.  This may relate to an infectious/inflammatory etiology clinical correlation is needed.  There is no evidence for pneumothorax.    Evaluation for intrathoracic adenopathy is limited on this exam however obvious adenopathy is not appreciated.  Thoracic aortic and coronary arterial atherosclerotic change noted.  Mild pericardial thickening or fluid is noted.  Otherwise evaluation of the heart and great vessels is limited on this noncontrast examination.    There is appearance of  postoperative change of the stomach.  There is mild to moderate gallbladder distention without evidence for primary pericholecystic inflammatory change.  When accounting for limitations of the exam there is no evidence for acute process of the liver, pancreas, spleen or adrenal glands.  There is no evidence for hydronephrosis or obstructive uropathy bilaterally.  The abdominal aorta and arterial vascular structures demonstrate atherosclerotic change.  The urinary bladder is moderately distended, nonspecific appearance.  Mild calcifications/mineralization of the prostate noted.    There is no evidence for small bowel obstructive process.  It is difficult to delineate the appendix there is a structure thought to represent the appendix it does not appear inflamed.  There is mild prominence of the colon throughout its course with air and stool in the colon appears redundant and tortuous without inflammatory or obstructive pattern.    There is a general pattern of haziness of the abdominal mesenteric fat planes, a nonspecific finding may relate to a general pattern of edema.  There is no free intraperitoneal air.    The osseous structures appear intact with chronic change noted.  Impression: Small bilateral pleural effusion, left greater than right.    Small pulmonary micro nodules and ground-glass infiltrates may relate to an infectious/inflammatory process, clinical and historical correlation is needed to determine need for additional follow-up.    Mild pericardial thickening or fluid noted.    General pattern of haziness of the abdominal mesenteric fat planes, a nonspecific finding may relate to a general pattern of edema.    Nonspecific mild prominence of the colon with air and stool.    The examination is otherwise limited, performed without intravenous or oral contrast.    This report was flagged in Epic as abnormal.    Electronically signed by: Jose Hudson  Date:    05/16/2023  Time:    03:06      I have  reviewed prior labs, imaging, and notes.      Assessment:  1. Encounter to establish care    2. History of peptic ulcer disease    3. Anemia, unspecified type      Presents to establish care and for hospital follow up. 5/2023 admitted to r/o GI bleed.   EGD without any obvious signs of bleeding. Colonoscopy with poor prep. Recommended repeating prep in 6 months (Due 11/2023).   Discharged with Protonix daily, Carafate BID, and PO Iron. Has no complaints at this time. Doing well.   Tolerating medications without any side effects.     Plan:  Orders Placed This Encounter    CBC Without Differential    Iron and TIBC    Ferritin    Ambulatory referral/consult to Internal Medicine    pantoprazole (PROTONIX) 40 MG tablet     CBC and Iron Studies  Continue Protonix as previously prescribed. Refilled.   Continue carafate as previously prescribed.   Colonoscopy in 11/2023 (reminder placed)  Referral to internal medicine to establish care.     Plan of care discussed with patient who is in agreement and verbalized understanding.     I have explained the planned procedures to the patient.The risks, benefits and alternatives of the procedure were also explained in detail. Patient verbalized understanding, all questions were answered. The patient agrees to proceed as planned    Follow Up: As Needed          Cherise Ledesma, NAKUL,FNP-BC  [facility]    (Portions of this note were dictated using voice recognition software and may contain dictation related errors in spelling/grammar/syntax not found on text review)

## 2023-07-24 NOTE — PATIENT INSTRUCTIONS
Continue pantoprazole once a day. Refilled.   Continue the sucralfate until finish current prescription.   Continue iron once a day.     Labs to check iron counts and blood counts.     Colonoscopy for screening will be due in November. I will set a reminder to call you to schedule.     Referral to establish care with primary care here at [facility] [place].

## 2023-10-03 ENCOUNTER — TELEPHONE (OUTPATIENT)
Dept: GASTROENTEROLOGY | Facility: CLINIC | Age: 68
End: 2023-10-03
Payer: MEDICARE

## 2023-10-03 DIAGNOSIS — D64.9 ANEMIA, UNSPECIFIED TYPE: Primary | ICD-10-CM

## 2023-10-03 DIAGNOSIS — Z12.11 SCREENING FOR COLON CANCER: ICD-10-CM

## 2023-10-03 RX ORDER — SODIUM, POTASSIUM,MAG SULFATES 17.5-3.13G
1 SOLUTION, RECONSTITUTED, ORAL ORAL DAILY
Qty: 1 KIT | Refills: 0 | Status: SHIPPED | OUTPATIENT
Start: 2023-10-03 | End: 2024-01-29 | Stop reason: SDUPTHER

## 2023-10-03 NOTE — LETTER
[place] [facility]  [address]    [place] [address]  Phone: [phone]         SUPREP Instructions    [facility]  [address]  Clinic Office [phone]  Endoscopy Lab [phone]    You are scheduled for a Colonoscopy with Dr. Alfonso  on Nov. 28, 2023  at [facility] in [place].    Check in at the Hospital -1st floor, Information desk.   Call [phone] to reschedule.    An adult friend/family member must come with you to drive you home.  You cannot drive, take a taxi, Uber/Lyft or bus to leave the Endoscopy Center alone.  If you do not have someone to drive you home, your test will be cancelled.     Please follow the directions of your doctor if you take any pills that thin your blood. If you take these meds: Aggrenox, Brilinta, Effient, Eliquis, Lovenox, Plavix, Pletal, Pradaxa, Ticilid, Xarelto or Coumadin, let the doctor's office know.    Please hold any GLP-1 medications prior to the procedure: Dulaglutide Trulicity(hold week prior), Exenatide Byetta (hold the morning of procedure), Semaglutide Ozempic (hold week prior), Liraglutide Victoza, Saxenda(hold week prior), Lixisenatide Adlyxin (hold the morning of procedure), Semaglutide Rybelsus (hold the morning of procedure), Tirzepatide Mounjaro (hold week prior)     DON'T: On the morning of the test do not take insulin or pills for diabetes.     DO: On the morning of the test, do take any pills for blood pressure, heart, anti-rejection and or seizures with a small sip of water. Bring any inhalers with you.    To have a good prep, you must follow these instructions - please do not use the directions from the pharmacy.    The doctor will send a prescription for the SUPREP.      The Day Before the test:    You can only drink CLEAR LIQUIDS the whole day before your test.  You can't eat any food for the whole day.    You CAN have:  Water, Coffee or decaf coffee (no milk or  cream)  Tea  Soft drinks - regular and sugar free  Jello (green or yellow)  Apple Juice, white grape juice, white cranberry juice  Gatorade, Power Aid, Crystal Light, Sudeep Aid  Lemonade and Limeade  Bouillon, clear soup  Snowball, popsicles  YOU CAN'T DRINK ANYTHING RED, PURPLE ORANGE OR BLUE   YOU CAN'T DRINK ALCOHOL  ONLY DRINK WHAT IS ON THE LIST      At 5 pm the night before your test:    Pour the 1st bottle of SUPREP into the cup provided in the box. Add water to the line on the cup and mix well.  Drink the whole cup and then drink 2 more full cups of water over 1 hour.  This can be easier to drink if it is cold. You can mix it 20 minutes ahead of time and place in the refrigerator before you drink it.  You must drink it within 30-45 minutes of mixing it.  Do NOT pour the drink over ice.  You can drink it with a straw.    The Day of the test - We will call you 2 days before your test to tell you what time to get there.    5 hours before you come to the hospital (this may be in the middle of the night)  Pour the 2nd bottle of SUPREP into the cup provided in the box. Add water to the line on the cup and mix well.  Drink the whole cup and then drink 2 more full cups of water over 1 hour.  It might be easier to drink if it is cold. You can mix it 20 minutes ahead of time and place in the refrigerator before you drink it.  You must drink it within 30-45 minutes of mixing it.  Do NOT pour the drink over ice.  You can drink it with a straw.    YOU CAN'T EAT OR DRINK ANYTHING ELSE ONCE YOU FINISH THE PREP    Leave all valuables and jewelry at home. You will be at the hospital for 2-4 hours.    Call the Endoscopy department at [phone] with any questions about your procedure.

## 2023-10-03 NOTE — TELEPHONE ENCOUNTER
Spoke with pt and got him scheduled for repeat colonoscopy with Dr. Alfonso 11/28/23. Went over instructions. Mailed as well. Answered all questions. Verbal understanding

## 2023-10-19 ENCOUNTER — TELEPHONE (OUTPATIENT)
Dept: ADMINISTRATIVE | Facility: OTHER | Age: 68
End: 2023-10-19
Payer: MEDICARE

## 2023-11-20 ENCOUNTER — TELEPHONE (OUTPATIENT)
Dept: GASTROENTEROLOGY | Facility: CLINIC | Age: 68
End: 2023-11-20
Payer: MEDICARE

## 2023-11-20 NOTE — TELEPHONE ENCOUNTER
----- Message from Karel Zuñiga sent at 11/20/2023  3:29 PM CST -----  Contact: pt  Type:  Procedure time    Who Called: pt   Would the patient rather a call back or a response via [facility]? call  Best Call Back Number: [phone]  Additional Information:   Pt requesting a call

## 2023-11-20 NOTE — TELEPHONE ENCOUNTER
Spoke with pt and informed him that the endo dept calls the day before to give arrival time. Pt states that he doesn't have an answering machine in case call is missed. Informed pt that he can call the day before to get arrival time as well. Verbal understanding

## 2023-11-21 ENCOUNTER — TELEPHONE (OUTPATIENT)
Dept: ENDOSCOPY | Facility: HOSPITAL | Age: 68
End: 2023-11-21
Payer: MEDICARE

## 2023-11-21 NOTE — TELEPHONE ENCOUNTER
Spoke with patient about arrival time @ 4429.   Colon/Suprep    Prep instructions reviewed: the day before the procedure, follow a clear liquid diet all day, then start the first 1/2 of prep at 5pm and take 2nd 1/2 of prep @ 0800.  Pt must be completely NPO when prep completed @ 1000.  Patient received letter w/inst in the mail.  Went over inst with patient who was very rude and seemed to have difficulty comprehending information.  Last colon exam was incomplete due to poor prep as an in-patient.             Medications: Do not take Insulin or oral diabetic medications the day of the procedure.  Take as prescribed: heart, seizure and blood pressure medication in the morning with a sip of water (less than an ounce).  Take any breathing medications and bring inhalers to hospital with you Leave all valuables and jewelry at home.     Wear comfortable clothes to procedure to change into hospital gown You cannot drive for 24 hours after your procedure because you will receive sedation for your procedure to make you comfortable.  A ride must be provided at discharge.

## 2023-11-24 ENCOUNTER — TELEPHONE (OUTPATIENT)
Dept: GASTROENTEROLOGY | Facility: CLINIC | Age: 68
End: 2023-11-24
Payer: MEDICARE

## 2023-11-24 NOTE — TELEPHONE ENCOUNTER
----- Message from Kailey Gomez sent at 11/24/2023  3:03 PM CST -----  Type:  Needs Medical Advice    Who Called: pt  Symptoms (please be specific): pt needs a call back to reschedule his Endo on Monday is not going to have a ride there and back please call to reschedule    Would the patient rather a call back or a response via [facility]? Call  Best Call Back Number:  [phone]  Additional Information:

## 2023-11-24 NOTE — TELEPHONE ENCOUNTER
Spoke with pt. Pt rescheduled procedure to 1/19/24. Pt asked what the procedure was for. Informed him per Dr. Alfonso's note, it is a 6 month recall due to the poor prep in the first procedure. Pt continued to ask multiple times whether or not, if hypothetically Dr. Alfonso found cancer, would it be fatal or treatable. Informed pt we will not know until after the colonoscopy and it isn't possible to predict that without the colonoscopy. Informed pt as of right now we do not know if he has cancer or not.

## 2024-01-17 ENCOUNTER — TELEPHONE (OUTPATIENT)
Dept: ENDOSCOPY | Facility: HOSPITAL | Age: 69
End: 2024-01-17
Payer: MEDICARE

## 2024-01-17 NOTE — TELEPHONE ENCOUNTER
Spoke with patient about arrival time @ 6610.   Colon/Suprep    Prep instructions reviewed: the day before the procedure, follow a clear liquid diet all day, then start the first 1/2 of prep at 5pm and take 2nd 1/2 of prep @ 0645.  Pt must be completely NPO when prep completed @ 0845.               Medications: Do not take Insulin or oral diabetic medications the day of the procedure.  Take as prescribed: heart, seizure and blood pressure medication in the morning with a sip of water (less than an ounce).  Take any breathing medications and bring inhalers to hospital with you Leave all valuables and jewelry at home.     Wear comfortable clothes to procedure to change into hospital gown You cannot drive for 24 hours after your procedure because you will receive sedation for your procedure to make you comfortable.  A ride must be provided at discharge.

## 2024-01-19 ENCOUNTER — TELEPHONE (OUTPATIENT)
Dept: GASTROENTEROLOGY | Facility: CLINIC | Age: 69
End: 2024-01-19
Payer: MEDICARE

## 2024-01-19 ENCOUNTER — TELEPHONE (OUTPATIENT)
Dept: ENDOSCOPY | Facility: HOSPITAL | Age: 69
End: 2024-01-19
Payer: MEDICARE

## 2024-01-19 ENCOUNTER — PATIENT MESSAGE (OUTPATIENT)
Dept: GASTROENTEROLOGY | Facility: CLINIC | Age: 69
End: 2024-01-19
Payer: MEDICARE

## 2024-01-19 NOTE — TELEPHONE ENCOUNTER
Spoke with pt brother to reschedule procedure. Brother will call back to inform if pt needs another prep sent in or not. Verbal understanding

## 2024-01-19 NOTE — TELEPHONE ENCOUNTER
Patient cancelled colon exam scheduled for today, has called office requesting a callback to reschedule.  Message sent to Dr. Alfonso and staff.

## 2024-01-29 RX ORDER — SODIUM, POTASSIUM,MAG SULFATES 17.5-3.13G
1 SOLUTION, RECONSTITUTED, ORAL ORAL DAILY
Qty: 1 KIT | Refills: 0 | Status: SHIPPED | OUTPATIENT
Start: 2024-01-29 | End: 2024-02-01

## 2024-01-29 NOTE — TELEPHONE ENCOUNTER
----- Message from Kailey Gomez sent at 1/29/2024  4:15 PM CST -----  Type:  RX Refill Request    Who Called: pt  Refill or New Rx:New  RX Name and Strength:SUPREP  How is the patient currently taking it? (ex. 1XDay):   Is this a 30 day or 90 day RX:   Pref[facility] Pharm[facility] with phone number[facility]  Local or Mail Order:local  Ordering Provider:Dr Alfonso  Would the patient rather a call back or a response via [facility]? [facility]  Best Call Back Number:[phone]  Additional Information: HE NEEDS HIS PREP RESENT TO THE PHARMACY AT [facility]

## 2024-02-02 ENCOUNTER — TELEPHONE (OUTPATIENT)
Dept: ENDOSCOPY | Facility: HOSPITAL | Age: 69
End: 2024-02-02
Payer: MEDICARE

## 2024-02-02 NOTE — TELEPHONE ENCOUNTER
Spoke with patient about arrival time @ 0900.   Colon/Suprep    Prep instructions reviewed: the day before the procedure, follow a clear liquid diet all day, then start the first 1/2 of prep at 5pm and take 2nd 1/2 of prep @ 0400.  Pt must be completely NPO when prep completed @ 0600.              Medications: Do not take Insulin or oral diabetic medications the day of the procedure.  Take as prescribed: heart, seizure and blood pressure medication in the morning with a sip of water (less than an ounce).  Take any breathing medications and bring inhalers to hospital with you Leave all valuables and jewelry at home.     Wear comfortable clothes to procedure to change into hospital gown You cannot drive for 24 hours after your procedure because you will receive sedation for your procedure to make you comfortable.  A ride must be provided at discharge.

## 2024-02-06 ENCOUNTER — HOSPITAL ENCOUNTER (OUTPATIENT)
Facility: HOSPITAL | Age: 69
Discharge: HOME | End: 2024-02-06
Attending: INTERNAL MEDICINE | Admitting: INTERNAL MEDICINE
Payer: MEDICARE

## 2024-02-06 ENCOUNTER — ANESTHESIA EVENT (OUTPATIENT)
Dept: ENDOSCOPY | Facility: HOSPITAL | Age: 69
End: 2024-02-06
Payer: MEDICARE

## 2024-02-06 ENCOUNTER — ANESTHESIA (OUTPATIENT)
Dept: ENDOSCOPY | Facility: HOSPITAL | Age: 69
End: 2024-02-06
Payer: MEDICARE

## 2024-02-06 VITALS
RESPIRATION RATE: 16 BRPM | SYSTOLIC BLOOD PRESSURE: 120 MMHG | WEIGHT: 165 LBS | TEMPERATURE: 98 F | DIASTOLIC BLOOD PRESSURE: 64 MMHG | HEIGHT: 70 IN | BODY MASS INDEX: 23.62 KG/M2 | HEART RATE: 51 BPM | OXYGEN SATURATION: 97 %

## 2024-02-06 DIAGNOSIS — Z12.11 SCREEN FOR COLON CANCER: ICD-10-CM

## 2024-02-06 PROCEDURE — D9220A PRA ANESTHESIA: Mod: CRNA,,, | Performed by: NURSE ANESTHETIST, CERTIFIED REGISTERED

## 2024-02-06 PROCEDURE — 25000003 PHARM REV CODE 250: Performed by: NURSE ANESTHETIST, CERTIFIED REGISTERED

## 2024-02-06 PROCEDURE — G0121 COLON CA SCRN NOT HI RSK IND: HCPCS | Performed by: INTERNAL MEDICINE

## 2024-02-06 PROCEDURE — 25000003 PHARM REV CODE 250: Performed by: INTERNAL MEDICINE

## 2024-02-06 PROCEDURE — 37000008 HC ANESTHESIA 1ST 15 MINUTES: Performed by: INTERNAL MEDICINE

## 2024-02-06 PROCEDURE — D9220A PRA ANESTHESIA: Mod: ANES,,, | Performed by: ANESTHESIOLOGY

## 2024-02-06 PROCEDURE — 63600175 PHARM REV CODE 636 W HCPCS: Performed by: NURSE ANESTHETIST, CERTIFIED REGISTERED

## 2024-02-06 PROCEDURE — 37000009 HC ANESTHESIA EA ADD 15 MINS: Performed by: INTERNAL MEDICINE

## 2024-02-06 PROCEDURE — G0121 COLON CA SCRN NOT HI RSK IND: HCPCS | Mod: ,,, | Performed by: INTERNAL MEDICINE

## 2024-02-06 RX ORDER — SODIUM CHLORIDE 9 MG/ML
INJECTION, SOLUTION INTRAVENOUS CONTINUOUS
Status: ACTIVE | OUTPATIENT
Start: 2024-02-06

## 2024-02-06 RX ORDER — PROPOFOL 10 MG/ML
VIAL (ML) INTRAVENOUS
Status: DISCONTINUED | OUTPATIENT
Start: 2024-02-06 | End: 2024-02-06

## 2024-02-06 RX ORDER — LIDOCAINE HYDROCHLORIDE 20 MG/ML
INJECTION INTRAVENOUS
Status: DISCONTINUED | OUTPATIENT
Start: 2024-02-06 | End: 2024-02-06

## 2024-02-06 RX ORDER — SODIUM CHLORIDE 0.9 % (FLUSH) 0.9 %
10 SYRINGE (ML) INJECTION
Status: ACTIVE | OUTPATIENT
Start: 2024-02-06

## 2024-02-06 RX ORDER — PROPOFOL 10 MG/ML
VIAL (ML) INTRAVENOUS CONTINUOUS PRN
Status: DISCONTINUED | OUTPATIENT
Start: 2024-02-06 | End: 2024-02-06

## 2024-02-06 RX ADMIN — GLYCOPYRROLATE 0.1 MG: 0.2 INJECTION, SOLUTION INTRAMUSCULAR; INTRAVITREAL at 10:02

## 2024-02-06 RX ADMIN — PROPOFOL 150 MCG/KG/MIN: 10 INJECTION, EMULSION INTRAVENOUS at 10:02

## 2024-02-06 RX ADMIN — SODIUM CHLORIDE: 9 INJECTION, SOLUTION INTRAVENOUS at 10:02

## 2024-02-06 RX ADMIN — LIDOCAINE HYDROCHLORIDE 50 MG: 20 INJECTION, SOLUTION INTRAVENOUS at 10:02

## 2024-02-06 RX ADMIN — PROPOFOL 70 MG: 10 INJECTION, EMULSION INTRAVENOUS at 10:02

## 2024-02-06 NOTE — ANESTHESIA POSTPROCEDURE EVALUATION
Anesthesia Post Evaluation    Patient: Nacho Gusman    Procedure(s) Performed: Procedure(s) (LRB):  COLONOSCOPY (N/A)    Final Anesthesia Type: general      Patient location during evaluation: PACU  Patient participation: Yes- Able to Participate  Level of consciousness: awake and alert  Post-procedure vital signs: reviewed and stable  Pain management: adequate  Airway patency: patent    PONV status at discharge: No PONV  Anesthetic complications: no      Cardiovascular status: blood pressure returned to baseline  Respiratory status: unassisted  Hydration status: euvolemic                Vitals Value Taken Time   /59 02/06/24 1135   Temp 36.7 °C (98.1 °F) 02/06/24 1120   Pulse 47 02/06/24 1135   Resp 12 02/06/24 1135   SpO2 100 % 02/06/24 1135         No case tracking events are documented in the log.      Pain/Carlos Score: Carlos Score: 9 (2/6/2024 11:35 AM)

## 2024-02-06 NOTE — [employer] PATIENT INSTRUCTIONS
Discharge Summary/Instructions after an Endoscopic Procedure  Patient Name: Nacho Gusman  Patient MRN: [id number]  Patient YOB: 1955 Tuesday, February 6, 2024  Alberto Alfonso MD  Dear patient,  As a result of recent federal legislation (The Federal Cures Act), you may   receive lab or pathology results from your procedure in your [facility]   account before your physician is able to contact you. Your physician or   their representative will relay the results to you with their   recommendations at their soonest availability.  Thank you,  Your health is very important to us during the Covid Crisis. Following your   procedure today, you will receive a daily text for 2 weeks asking about   signs or symptoms of Covid 19.  Please respond to this text when you   receive it so we can follow up and keep you as safe as possible.   RESTRICTIONS:  During your procedure today, you received medications for sedation.  These   medications may affect your judgment, balance and coordination.  Therefore,   for 24 hours, you have the following restrictions:   - DO NOT drive a car, operate machinery, make legal/financial decisions,   sign important papers or drink alcohol.    ACTIVITY:  Today: no heavy lifting, straining or running due to procedural   sedation/anesthesia.  The following day: return to full activity including work.  DIET:  Eat and drink normally unless instructed otherwise.     TREATMENT FOR COMMON SIDE EFFECTS:  - Mild abdominal pain, nausea, belching, bloating or excessive gas:  rest,   eat lightly and use a heating pad.  - Sore Throat: treat with throat lozenges and/or gargle with warm salt   water.  - Because air was used during the procedure, expelling large amounts of air   from your rectum or belching is normal.  - If a bowel prep was taken, you may not have a bowel movement for 1-3 days.    This is normal.  SYMPTOMS TO WATCH FOR AND REPORT TO YOUR PHYSICIAN:  1. Abdominal pain or bloating, other than  gas cramps.  2. Chest pain.  3. Back pain.  4. Signs of infection such as: chills or fever occurring within 24 hours   after the procedure.  5. Rectal bleeding, which would show as bright red, maroon, or black stools.   (A tablespoon of blood from the rectum is not serious, especially if   hemorrhoids are present.)  6. Vomiting.  7. Weakness or dizziness.  GO DIRECTLY TO THE NEAREST EMERGENCY ROOM IF YOU HAVE ANY OF THE FOLLOWING:      Difficulty breathing              Chills and/or fever over 101 F   Persistent vomiting and/or vomiting blood   Severe abdominal pain   Severe chest pain   Black, tarry stools   Bleeding- more than one tablespoon   Any other symptom or condition that you feel may need urgent attention  Your doctor recommends these additional instructions:  If any biopsies were taken, your doctors clinic will contact you in 1 to 2   weeks with any results.  - Discharge patient to home.   - Patient has a contact number available for emergencies.  The signs and   symptoms of potential delayed complications were discussed with the   patient.  Return to normal activities tomorrow.  Written discharge   instructions were provided to the patient.   - Resume previous diet.   - Continue present medications.   - Repeat colonoscopy in 5 years for screening purposes.  For questions, problems or results please call your physician - Alberto Alfonso MD.  EMERGENCY PHONE NUMBER: [phone],  LAB RESULTS: [phone]  IF A COMPLICATION OR EMERGENCY SITUATION ARISES AND YOU ARE UNABLE TO REACH   YOUR PHYSICIAN - GO DIRECTLY TO THE EMERGENCY ROOM.  Alberto Alfonso MD  2/6/2024 11:20:23 AM  This report has been verified and signed electronically.  Dear patient,  As a result of recent federal legislation (The Federal Cures Act), you may   receive lab or pathology results from your procedure in your [facility]   account before your physician is able to contact you. Your physician or   their representative will  relay the results to you with their   recommendations at their soonest availability.  Thank you,  [employer]

## 2024-02-06 NOTE — TRANSFER OF CARE
"Anesthesia Transfer of Care Note    Patient: Nacho Gusman    Procedure(s) Performed: Procedure(s) (LRB):  COLONOSCOPY (N/A)    Patient location: GI    Anesthesia Type: general    Transport from OR: Transported from OR on room air with adequate spontaneous ventilation    Post pain: adequate analgesia    Post assessment: no apparent anesthetic complications    Post vital signs: stable    Level of consciousness: awake    Nausea/Vomiting: no nausea/vomiting    Complications: none    Transfer of care protocol was followed      Last vitals: Visit Vitals  BP (!) 117/57 (Patient Position: Lying)   Pulse (!) 46   Temp 36.6 °C (97.9 °F) (Temporal)   Resp 18   Ht 5' 10" (1.778 m)   Wt 74.8 kg (165 lb)   SpO2 98%   BMI 23.68 kg/m²     "

## 2024-02-06 NOTE — ANESTHESIA PREPROCEDURE EVALUATION
02/06/2024    Nacho Gusman is a 68 y.o., male.      Pre-op Assessment    I have reviewed the Patient Summary Reports.     I have reviewed the Nursing Notes. I have reviewed the NPO Status.      Review of Systems  Anesthesia Hx:   History of prior surgery of interest to airway management or planning:            Denies Personal Hx of Anesthesia complications.                    Cardiovascular:     Hypertension                                        Hepatic/GI:   PUD,               Neurological:       Seizures                                Psych:  Psychiatric History                  Physical Exam  General: Well nourished    Airway:  Mallampati: II   Mouth Opening: Normal  Neck ROM: Normal ROM        Anesthesia Plan  Type of Anesthesia, risks & benefits discussed:    Anesthesia Type: Gen Natural Airway  Informed Consent: Informed consent signed with the Patient and all parties understand the risks and agree with anesthesia plan.  All questions answered.   ASA Score: 2    Ready For Surgery From Anesthesia Perspective.     .

## 2024-02-06 NOTE — H&P
Short Stay Endoscopy History and Physical    PCP - Esmer Licona MD    Procedure - Colonoscopy  ASA - per anesthesia  Mallampati - per anesthesia  History of Anesthesia problems - no  Family history Anesthesia problems - no   Plan of anesthesia - General    HPI:  This is a 68 y.o. male here for evaluation of : asymptomatic screening exam    Prior poor prep      ROS:  Constitutional: No fevers, chills, No weight loss  CV: No chest pain  Pulm: No cough, No shortness of breath  GI: see HPI  Derm: No rash    Medical History:  has a past medical history of Depression, Gastric ulcer (1997), Hypertension, and Seizure disorder.    Surgical History:  has a past surgical history that includes stomach surgery (1997); Esophagogastroduodenoscopy (N/A, 5/17/2023); and Colonoscopy (N/A, 5/17/2023).    Family History: family history includes Hypertension in his brother and father.. Otherwise no colon cancer, inflammatory bowel disease, or GI malignancies.    Social History:  reports that he quit smoking about 26 years ago. His smoking use included cigarettes. He started smoking about 46 years ago. He has a 30.0 pack-year smoking history. He does not have any smokeless tobacco history on file. He reports that he does not drink alcohol and does not use drugs.    Review of patient's allergies indicates:  No Known Allergies    Medications:   Medications Prior to Admission   Medication Sig Dispense Refill Last Dose    ascorbic acid, vitamin C, (VITAMIN C) 500 MG tablet Take 500 mg by mouth once daily.   2/5/2024    co-enzyme Q-10 30 mg capsule Take 30 mg by mouth once daily.   Past Week    ferrous sulfate (IRON) 325 mg (65 mg iron) Tab tablet Take 1 tablet (325 mg total) by mouth daily with breakfast. 60 tablet 2 2/5/2024    folic acid (FOLVITE) 400 MCG tablet Take 400 mcg by mouth once daily.   2/5/2024    magnesium oxide (MAG-OX) 400 mg (241.3 mg magnesium) tablet Take 400 mg by mouth once daily.   2/5/2024    omega  3-dha-epa-fish oil 1,200 (144-216) mg Cap Take 1,200 mg by mouth 2 (two) times daily.   Past Week    pantoprazole (PROTONIX) 40 MG tablet Take 1 tablet (40 mg total) by mouth once daily. 60 tablet 3 Past Week    sucralfate (CARAFATE) 1 gram tablet Take 1 tablet (1 g total) by mouth 2 (two) times daily. 120 tablet 2 Past Week    vitamin E 400 UNIT capsule Take 400 Units by mouth once daily.   Past Week         Physical Exam:    Vital Signs:   Vitals:    02/06/24 0955   BP: (!) 117/57   Pulse: (!) 46   Resp: 18   Temp: 97.9 °F (36.6 °C)       General Appearance: Well appearing in no acute distress  Eyes:    No scleral icterus  ENT: Neck supple, Lips, mucosa, and tongue normal; teeth and gums normal  Abdomen: Soft, non tender, non distended with positive bowel sounds. No hepatosplenomegaly, ascites, or mass.  Extremities: 2+ pulses, no clubbing, cyanosis or edema  Skin: No rash      Labs:  Lab Results   Component Value Date    WBC 4.85 05/18/2023    HGB 7.2 (L) 05/19/2023    HCT 24.4 (L) 05/19/2023     (L) 05/18/2023    CHOL 142 08/02/2018    TRIG 109 08/02/2018    HDL 48 08/02/2018    ALT 11 05/18/2023    AST 16 05/18/2023     05/18/2023    K 4.0 05/18/2023     (H) 05/18/2023    CREATININE 1.0 05/18/2023    BUN 9 05/18/2023    CO2 19 (L) 05/18/2023    TSH 1.859 08/02/2018       I have explained the risks and benefits of endoscopy procedures to the patient including but not limited to bleeding, perforation, infection, and death.  The patient was asked if they understand and allowed to ask any further questions to their satisfaction.    Alberto Alfonso MD

## 2024-08-06 ENCOUNTER — OFFICE VISIT (OUTPATIENT)
Dept: FAMILY MEDICINE | Facility: HOSPITAL | Age: 69
End: 2024-08-06
Payer: MEDICARE

## 2024-08-06 VITALS
SYSTOLIC BLOOD PRESSURE: 104 MMHG | BODY MASS INDEX: 23.07 KG/M2 | HEART RATE: 52 BPM | DIASTOLIC BLOOD PRESSURE: 63 MMHG | HEIGHT: 70 IN | WEIGHT: 161.19 LBS

## 2024-08-06 DIAGNOSIS — D50.9 MICROCYTIC ANEMIA: ICD-10-CM

## 2024-08-06 DIAGNOSIS — Z87.11 HISTORY OF PEPTIC ULCER DISEASE: Primary | ICD-10-CM

## 2024-08-06 DIAGNOSIS — Z87.891 HISTORY OF SMOKING 30 OR MORE PACK YEARS: ICD-10-CM

## 2024-08-06 DIAGNOSIS — R73.03 PRE-DIABETES: ICD-10-CM

## 2024-08-06 DIAGNOSIS — I10 PRIMARY HYPERTENSION: ICD-10-CM

## 2024-08-06 DIAGNOSIS — Z23 NEED FOR VACCINATION FOR PNEUMOCOCCUS: ICD-10-CM

## 2024-08-06 PROBLEM — J90 PLEURAL EFFUSION: Status: RESOLVED | Noted: 2023-05-17 | Resolved: 2024-08-06

## 2024-08-06 PROCEDURE — 99214 OFFICE O/P EST MOD 30 MIN: CPT

## 2024-08-06 RX ORDER — PANTOPRAZOLE SODIUM 40 MG/1
40 TABLET, DELAYED RELEASE ORAL DAILY
Qty: 60 TABLET | Refills: 3 | Status: SHIPPED | OUTPATIENT
Start: 2024-08-06

## 2024-08-07 ENCOUNTER — PATIENT MESSAGE (OUTPATIENT)
Dept: FAMILY MEDICINE | Facility: HOSPITAL | Age: 69
End: 2024-08-07
Payer: MEDICARE

## 2024-08-08 ENCOUNTER — TELEPHONE (OUTPATIENT)
Dept: FAMILY MEDICINE | Facility: HOSPITAL | Age: 69
End: 2024-08-08
Payer: MEDICARE

## 2024-08-08 DIAGNOSIS — D50.9 IRON DEFICIENCY ANEMIA, UNSPECIFIED IRON DEFICIENCY ANEMIA TYPE: Primary | ICD-10-CM

## 2024-08-08 RX ORDER — FERROUS SULFATE 325(65) MG
325 TABLET ORAL
Qty: 60 TABLET | Refills: 2 | Status: SHIPPED | OUTPATIENT
Start: 2024-08-08

## 2024-08-09 ENCOUNTER — HOSPITAL ENCOUNTER (OUTPATIENT)
Dept: RADIOLOGY | Facility: HOSPITAL | Age: 69
Discharge: HOME | End: 2024-08-09
Payer: MEDICARE

## 2024-08-09 DIAGNOSIS — Z87.891 HISTORY OF SMOKING 30 OR MORE PACK YEARS: ICD-10-CM

## 2024-08-09 PROCEDURE — 76706 US ABDL AORTA SCREEN AAA: CPT | Mod: TC

## 2024-09-09 ENCOUNTER — OFFICE VISIT (OUTPATIENT)
Dept: GASTROENTEROLOGY | Facility: CLINIC | Age: 69
End: 2024-09-09
Payer: MEDICARE

## 2024-09-09 VITALS — BODY MASS INDEX: 23.95 KG/M2 | HEIGHT: 70 IN | WEIGHT: 167.31 LBS

## 2024-09-09 DIAGNOSIS — D50.9 IRON DEFICIENCY ANEMIA, UNSPECIFIED IRON DEFICIENCY ANEMIA TYPE: Primary | ICD-10-CM

## 2024-09-09 DIAGNOSIS — Z87.11 HISTORY OF PEPTIC ULCER DISEASE: ICD-10-CM

## 2024-09-09 PROCEDURE — 1159F MED LIST DOCD IN RCRD: CPT | Mod: CPTII,S$GLB,, | Performed by: NURSE PRACTITIONER

## 2024-09-09 PROCEDURE — 3008F BODY MASS INDEX DOCD: CPT | Mod: CPTII,S$GLB,, | Performed by: NURSE PRACTITIONER

## 2024-09-09 PROCEDURE — 1160F RVW MEDS BY RX/DR IN RCRD: CPT | Mod: CPTII,S$GLB,, | Performed by: NURSE PRACTITIONER

## 2024-09-09 PROCEDURE — 3044F HG A1C LEVEL LT 7.0%: CPT | Mod: CPTII,S$GLB,, | Performed by: NURSE PRACTITIONER

## 2024-09-09 PROCEDURE — 1126F AMNT PAIN NOTED NONE PRSNT: CPT | Mod: CPTII,S$GLB,, | Performed by: NURSE PRACTITIONER

## 2024-09-09 PROCEDURE — 99214 OFFICE O/P EST MOD 30 MIN: CPT | Mod: S$GLB,,, | Performed by: NURSE PRACTITIONER

## 2024-09-09 PROCEDURE — 1101F PT FALLS ASSESS-DOCD LE1/YR: CPT | Mod: CPTII,S$GLB,, | Performed by: NURSE PRACTITIONER

## 2024-09-09 PROCEDURE — 3288F FALL RISK ASSESSMENT DOCD: CPT | Mod: CPTII,S$GLB,, | Performed by: NURSE PRACTITIONER

## 2024-09-09 PROCEDURE — 99999 PR PBB SHADOW E&M-EST. PATIENT-LVL III: CPT | Mod: PBBFAC,,, | Performed by: NURSE PRACTITIONER

## 2024-09-09 NOTE — PROGRESS NOTES
GASTROENTEROLOGY CLINIC NOTE    Chief Complaint: The primary encounter diagnosis was Iron deficiency anemia, unspecified iron deficiency anemia type. A diagnosis of History of peptic ulcer disease was also pertinent to this visit.  Referring provider/PCP: Esmer Licona MD    Nacho Gusman is a 69 y.o. male who is a new patient to me with a PMH that's significant for peptic ulcer, anemia, and to establish care.  He is here today to establish care for anemia and hospital follow up.   Presented to ER in 5/2023 with c/o lethargy, lightheadedness and found to have hemoglobin of 3.5 for which he received multiple transfusions, IV iron.  Remained hemodynamically stable throughout the hospital stay.  Iron deficiency anemia thought to be in setting of slow upper GI bleed.  Underwent EGD on 05/17 that showed Billroth-I anastomosis, characterized by friable mucosa and erythema without bleeding with some oozing.  This area was biopsied, pathology pending at the time of discharge.  Colonoscopy 5/17 with poor prep no obvious bleeding noted.  Discharged on PPI, sucralfate iron supplements with GI follow-up.    Pathology unrevealing. Recommended repeating colonoscopy in 6 months (11/2023).   He is currently taking Protonix 40mg daily and Carafate BID. He is also taking PO iron daily. Tolerating medications without any adverse side effects.   No obvious signs of blood in stool. Denies SOB, unexplained fatigue, dizziness, changes in appetite, or unexplained weight loss.     Reflux: No  Dysphagia/Odynophagia: No  Nausea/Vomiting: No  Appetite Changes: No  Abdominal Pain: No  Bowel Habits: regular bowel movements. No change in bowel habits.  GI Bleeding: Denies hematochezia, hematemesis, melena, BRBPR, Black/tarry stool, coffee ground emesis  Unexplained Weight Loss: No     ____________________________________________________    Interval Note 9/9/2024    Presents for follow up. Continues with anemia. Taking PO iron; has  appointment 9/18/2024 with Hematology.   Colonoscopy unrevealing but with fair prep.   EGD with friable mucosa. Protonix and carafate initiated. Completed carafate. Did not refill protonix. Restarted by PCP.   Currently taking Protonix 40mg daily.   Has no GI complaints. No overt signs of bleeding.   Recent H/H 8.8/31.0. Iron <10.     NSAIDs: No  Anticoagulation or Antiplatelet: No    History of H.pylori: no  H.pylori Treatment:  Prior Upper Endoscopy: 5/17/2023 with Dr. Alfonso  Impression:            - Normal esophagus.                          - A Billroth I anastomosis was found,                          characterized by friable mucosa and erythema.                          - Normal examined jejunum. Biopsied.                          Billroth 1 with erythema and friable gastric                          mucosa but without bleeding, but some oozing on                          contact. The rest of the exam normal     Recommendation:        - [facility] to [facility] for [facility] care.                          - Resume previous diet.                          - Continue present medications.                          - use protonix daily ; use carafate twice a day ;                          suggest setting up IV iron intermittently                          - Await pathology results.                          - Perform a colonoscopy today.                          - See the other procedure note for documentation                          of additional recommendations.     Pathology:  Small bowel, biopsy:   - Small bowel mucosa without significant histopathologic alteration   - Negative for active inflammation or celiac-like injury   - Negative for dysplasia or malignancy     Prior Colonoscopy: 2/6/2024 with Dr. Alfonso  Impression:            - Preparation of the colon was fair.                          - Anal papilla(e) were hypertrophied.                          - The examined portion of the ileum was normal.                           - The entire examined colon is normal on direct                          and retroflexion views.                          - No specimens collected.                          this is now 2nd colonoscopy (this with 2 day                          prep), this prep remains fair, extensive lavage of                          nearly 1L , with only fair clearance, both                          procedures, nothing large seen.                          I suggest repeating in 5 years given relatively                          fair prep but otherwise nothing seen.       5/17/2023 with Dr. Alfonso  Impression:            - Preparation of the colon was poor.                          - Stool in the sigmoid colon, in the transverse                          colon, in the ascending colon and in the cecum.                          - The examined portion of the ileum was normal.                          - No specimens collected.                          Todays egd / colon for severe iron def shows                          friable but nonbleeding gastric mucosa.                          Colon poor prep but no obvious lesions with normal                          TI and no old or new blood                          Severe iron def is likely related to gastric                          irritation as well as poor absorption from s/p                          gastric surgery.                          Would recommend IV iron.     Recommendation:        - Return patient to [facility] for possible                          discharge same day.                          - Advance diet as tolerated.                          - Continue present medications.                          - Repeat colonoscopy in 6 months because the bowel                          preparation was poor.     Family h/o Colon Cancer: No  Family h/o Crohn's Disease or Ulcerative Colitis: No  Family h/o Celiac Sprue: No  Abdominal Surgeries: 1997 perforated gastric  ulcer    Review of Systems   Constitutional:  Negative for weight loss.   HENT:  Negative for sore throat.    Eyes:  Negative for blurred vision.   Respiratory:  Negative for cough.    Cardiovascular:  Negative for chest pain.   Gastrointestinal:  Negative for abdominal pain, blood in stool, constipation, diarrhea, heartburn, melena, nausea and vomiting.   Genitourinary:  Negative for dysuria.   Musculoskeletal:  Negative for myalgias.   Skin:  Negative for rash.   Neurological:  Negative for headaches.   Endo/Heme/Allergies:  Negative for environmental allergies.   Psychiatric/Behavioral:  Negative for suicidal ideas. The patient is not nervous/anxious.        Past Medical History: has a past medical history of Depression, Gastric ulcer, Hypertension, Occult GI bleeding, Pleural effusion, and Seizure disorder.    Past Surgical History: has a past surgical history that includes stomach surgery (1997); Esophagogastroduodenoscopy (N/A, 5/17/2023); Colonoscopy (N/A, 5/17/2023); and Colonoscopy (N/A, 2/6/2024).    Family History:family history includes Hypertension in his brother and father.    Allergies: Review of patient's allergies indicates:  No Known Allergies    Social History: reports that he quit smoking about 27 years ago. His smoking use included cigarettes. He started smoking about 47 years ago. He has a 30 pack-year smoking history. He does not have any smokeless tobacco history on file. He reports that he does not drink alcohol and does not use drugs.    Home medications:   Current Outpatient Medications on File Prior to Visit   Medication Sig Dispense Refill    ascorbic acid, vitamin C, (VITAMIN C) 500 MG tablet Take 500 mg by mouth once daily.      co-enzyme Q-10 30 mg capsule Take 30 mg by mouth once daily.      ferrous sulfate (IRON) 325 mg (65 mg iron) Tab tablet Take 1 tablet (325 mg total) by mouth daily with breakfast. 60 tablet 2    folic acid (FOLVITE) 400 MCG tablet Take 400 mcg by mouth once  "daily.      magnesium oxide (MAG-OX) 400 mg (241.3 mg magnesium) tablet Take 400 mg by mouth once daily.      omega 3-dha-epa-fish oil 1,200 (144-216) mg Cap Take 1,200 mg by mouth 2 (two) times daily.      pantoprazole (PROTONIX) 40 MG tablet Take 1 tablet (40 mg total) by mouth once daily. 60 tablet 3    vitamin E 400 UNIT capsule Take 400 Units by mouth once daily.       Current Facility-Administered Medications on File Prior to Visit   Medication Dose Route Frequency Provider Last Rate Last Admin    0.9%  NaCl infusion   Intravenous Continuous Alberto Alfonso MD 20 mL/hr at 02/06/24 1011 New Bag at 02/06/24 1011    sodium chloride 0.9% flush 10 mL  10 mL Intravenous PRN Alberto Alfonso MD           Vital signs:  Ht 5' 10" (1.778 m)   Wt 75.9 kg (167 lb 5.3 oz)   BMI 24.01 kg/m²     Physical Exam  Vitals reviewed.   Constitutional:       Appearance: Normal appearance.   HENT:      Head: Normocephalic.   Cardiovascular:      Rate and Rhythm: Normal rate.   Pulmonary:      Effort: Pulmonary effort is normal. No respiratory distress.   Abdominal:      General: There is no distension.      Palpations: Abdomen is soft.      Tenderness: There is no abdominal tenderness.   Skin:     General: Skin is warm.      Coloration: Skin is pale.   Neurological:      Mental Status: He is alert and oriented to person, place, and time.   Psychiatric:         Behavior: Behavior normal.         Thought Content: Thought content normal.         Routine labs:  Lab Results   Component Value Date    WBC 4.24 08/06/2024    HGB 8.8 (L) 08/06/2024    HCT 31.0 (L) 08/06/2024    MCV 64 (L) 08/06/2024     08/06/2024     No results found for: "INR"  Lab Results   Component Value Date    IRON <10 (L) 08/06/2024    FERRITIN 5 (L) 08/06/2024    TIBC 503 (H) 08/06/2024    FESATURATED Unable to calculate 08/06/2024     Lab Results   Component Value Date     08/06/2024    K 4.7 08/06/2024     08/06/2024    CO2 23 08/06/2024    " "BUN 16 08/06/2024    CREATININE 1.2 08/06/2024     Lab Results   Component Value Date    ALBUMIN 3.6 08/06/2024    ALT 12 08/06/2024    AST 16 08/06/2024    ALKPHOS 105 08/06/2024    BILITOT 0.2 08/06/2024     No results found for: "GLUCOSE"  Lab Results   Component Value Date    TSH 1.691 08/06/2024     Lab Results   Component Value Date    CALCIUM 9.5 08/06/2024       Imaging:   CT Chest Abdomen Pelvis Without Contrast (XPD)  Narrative: EXAMINATION:  CT CHEST ABDOMEN PELVIS WITHOUT CONTRAST(XPD)    CLINICAL HISTORY:  Hematologic malignancy, staging;    TECHNIQUE:  Low dose axial images, sagittal and coronal reformations were obtained from the thoracic inlet to the pubic symphysis intravenous contrast and oral contrast was not utilized, this diminishes the sensitivity of the examination.    COMPARISON:  Chest radiograph May 15, 2023    FINDINGS:  There are small bilateral pleural effusions, left greater than right.  The lungs demonstrate mild motion artifact and atelectatic change.  There are several small micro nodules noted at the right upper lobe superiorly peripherally, as well as the left upper lobe anteriorly with mild ground-glass infiltrate.  This may relate to an infectious/inflammatory etiology clinical correlation is needed.  There is no evidence for pneumothorax.    Evaluation for intrathoracic adenopathy is limited on this exam however obvious adenopathy is not appreciated.  Thoracic aortic and coronary arterial atherosclerotic change noted.  Mild pericardial thickening or fluid is noted.  Otherwise evaluation of the heart and great vessels is limited on this noncontrast examination.    There is appearance of postoperative change of the stomach.  There is mild to moderate gallbladder distention without evidence for primary pericholecystic inflammatory change.  When accounting for limitations of the exam there is no evidence for acute process of the liver, pancreas, spleen or adrenal glands.  There is " no evidence for hydronephrosis or obstructive uropathy bilaterally.  The abdominal aorta and arterial vascular structures demonstrate atherosclerotic change.  The urinary bladder is moderately distended, nonspecific appearance.  Mild calcifications/mineralization of the prostate noted.    There is no evidence for small bowel obstructive process.  It is difficult to delineate the appendix there is a structure thought to represent the appendix it does not appear inflamed.  There is mild prominence of the colon throughout its course with air and stool in the colon appears redundant and tortuous without inflammatory or obstructive pattern.    There is a general pattern of haziness of the abdominal mesenteric fat planes, a nonspecific finding may relate to a general pattern of edema.  There is no free intraperitoneal air.    The osseous structures appear intact with chronic change noted.  Impression: Small bilateral pleural effusion, left greater than right.    Small pulmonary micro nodules and ground-glass infiltrates may relate to an infectious/inflammatory process, clinical and historical correlation is needed to determine need for additional follow-up.    Mild pericardial thickening or fluid noted.    General pattern of haziness of the abdominal mesenteric fat planes, a nonspecific finding may relate to a general pattern of edema.    Nonspecific mild prominence of the colon with air and stool.    The examination is otherwise limited, performed without intravenous or oral contrast.    This report was flagged in Epic as abnormal.    Electronically signed by: Jose Hudson  Date:    05/16/2023  Time:    03:06      I have reviewed prior labs, imaging, and notes.      Assessment:  1. Iron deficiency anemia, unspecified iron deficiency anemia type    2. History of peptic ulcer disease      Continues with iron deficiency anemia. Taking PO iron.   Has appointment scheduled with hematology.   Colonoscopy unrevealing.   EGD  2023 with friable mucosa. Protonix restarted 8/2024.     Plan:     Continue Protonix as previously prescribed. Continue Protonix indefinitely.   Follow with hematology as scheduled for evaluation for IV iron infusions.   If no improvement in H/H with Iron infusions, consider VCE as next step in workup.     Discussed plan of care with Dr. Alfonso.     Plan of care discussed with patient who is in agreement and verbalized understanding.     I have explained the planned procedures to the patient.The risks, benefits and alternatives of the procedure were also explained in detail. Patient verbalized understanding, all questions were answered. The patient agrees to proceed as planned    Follow Up: As Needed          Cherise Ledesma, NAKUL,FNP-BC  [facility]    (Portions of this note were dictated using voice recognition software and may contain dictation related errors in spelling/grammar/syntax not found on text review)

## 2024-09-09 NOTE — Clinical Note
Follow up for anemia. Colonoscopy this year with fair prep. EGD 5/2023 friable mucosa.  Continues with anemia. 8/6 last H/H 8.8/31 Has upcoming appt with hematology.  Do you recommend capsule or repeat EGD Stopped Protonix but PCP restarted in August. Has no complaints or obvious signs of blood in stool.

## 2024-09-12 PROBLEM — D69.6 THROMBOCYTOPENIA, UNSPECIFIED: Status: RESOLVED | Noted: 2023-06-05 | Resolved: 2024-09-12

## 2024-09-12 NOTE — PROGRESS NOTES
PATIENT: Nacho Gusman  MRN: [id number]  DATE: 9/18/2024    Diagnosis:   1. Iron deficiency anemia due to chronic blood loss    2. Iron deficiency anemia, unspecified iron deficiency anemia type    3. History of partial gastrectomy    4. Microcytic anemia      Chief Complaint: Anemia    Oncologic History:      Oncologic History     Oncologic Treatment     Pathology       Subjective:    History of Present Illness: Mr. Gusman is a 69 y.o. male who presents for evaluation and management of iron deficiency anemia. He is referred by Dr. Russell.    - labs since 2015 reveal a mild-to-severe microcytic anemia  - iron studies (8/6/24) revealed a decreased ferritin/iron with elevated total iron binding capacity.  - history of partial gastrectomy in 1997.    - he is taking oral iron    - today, he is doing well. He denies shortness of breath, chest pain, nausea, vomiting, diarrhea, constipation.        Past medical, surgical, family, and social histories have been reviewed and updated below.    Past Medical History:   Past Medical History:   Diagnosis Date    Depression     [facility]    Gastric ulcer 01/01/1997    surgery in 1997    Hypertension     Occult GI bleeding 02/13/2015    Pleural effusion 05/17/2023    Seizure disorder        Past Surgical History:   Past Surgical History:   Procedure Laterality Date    COLONOSCOPY N/A 5/17/2023    Procedure: COLONOSCOPY;  Surgeon: Alberto Alfonso MD;  Location: [facility];  Service: Endoscopy;  Laterality: N/A;    COLONOSCOPY N/A 2/6/2024    Procedure: COLONOSCOPY;  Surgeon: Alberto Alfonso MD;  Location: [facility];  Service: Endoscopy;  Laterality: N/A;    ESOPHAGOGASTRODUODENOSCOPY N/A 5/17/2023    Procedure: EGD (ESOPHAGOGASTRODUODENOSCOPY);  Surgeon: Alberto Alfonso MD;  Location: [facility];  Service: Endoscopy;  Laterality: N/A;    stomach surgery  1997    for perforated gastric ulcer       Family History:   Family History   Problem Relation Name Age of Onset     Hypertension Father      Hypertension Brother         Social History:  reports that he quit smoking about 27 years ago. His smoking use included cigarettes. He started smoking about 47 years ago. He has a 30 pack-year smoking history. He does not have any smokeless tobacco history on file. He reports that he does not drink alcohol and does not use drugs.    Allergies:  Review of patient's allergies indicates:  No Known Allergies    Medications:  Current Outpatient Medications   Medication Sig Dispense Refill    ascorbic acid, vitamin C, (VITAMIN C) 500 MG tablet Take 500 mg by mouth once daily.      co-enzyme Q-10 30 mg capsule Take 30 mg by mouth once daily.      ferrous sulfate (IRON) 325 mg (65 mg iron) Tab tablet Take 1 tablet (325 mg total) by mouth daily with breakfast. 60 tablet 2    folic acid (FOLVITE) 400 MCG tablet Take 400 mcg by mouth once daily.      magnesium oxide (MAG-OX) 400 mg (241.3 mg magnesium) tablet Take 400 mg by mouth once daily.      omega 3-dha-epa-fish oil 1,200 (144-216) mg Cap Take 1,200 mg by mouth 2 (two) times daily.      pantoprazole (PROTONIX) 40 MG tablet Take 1 tablet (40 mg total) by mouth once daily. 60 tablet 3    vitamin E 400 UNIT capsule Take 400 Units by mouth once daily.       No current facility-administered medications for this visit.     Facility-Administered Medications Ordered in Other Visits   Medication Dose Route Frequency Provider Last Rate Last Admin    0.9%  NaCl infusion   Intravenous Continuous Alberto Alfonso MD 20 mL/hr at 02/06/24 1011 New Bag at 02/06/24 1011    sodium chloride 0.9% flush 10 mL  10 mL Intravenous PRN Alberto Alfosno MD           Review of Systems   Constitutional:  Negative for fatigue.   HENT:  Negative for sore throat.    Eyes:  Negative for visual disturbance.   Respiratory:  Negative for shortness of breath.    Cardiovascular:  Negative for chest pain.   Gastrointestinal:  Negative for abdominal pain.   Genitourinary:  Negative  for dysuria.   Musculoskeletal:  Negative for back pain.   Skin:  Negative for rash.   Neurological:  Negative for headaches.   Hematological:  Negative for adenopathy.   Psychiatric/Behavioral:  The patient is not nervous/anxious.        ECOG Performance Status:   ECOG SCORE    1 - Restricted in strenuous activity-ambulatory and able to carry out work of a light nature         Objective:      Vitals:   Vitals:    09/18/24 1404   BP: (!) 102/57   BP Location: Left arm   Patient Position: Sitting   BP Method: Medium (Automatic)   Pulse: 61   SpO2: 100%   Weight: 74.7 kg (164 lb 10.9 oz)     BMI: Body mass index is 23.63 kg/m².    Physical Exam  Vitals and nursing note reviewed.   Constitutional:       Appearance: He is well-developed.   HENT:      Head: Normocephalic and atraumatic.   Eyes:      Pupils: Pupils are equal, round, and reactive to light.   Cardiovascular:      Rate and Rhythm: Normal rate and regular rhythm.   Pulmonary:      Effort: Pulmonary effort is normal.      Breath sounds: Normal breath sounds.   Abdominal:      General: Bowel sounds are normal.      Palpations: Abdomen is soft.   Musculoskeletal:         General: Normal range of motion.      Cervical back: Normal range of motion and neck supple.   Skin:     General: Skin is warm and dry.   Neurological:      Mental Status: He is alert and oriented to person, place, and time.   Psychiatric:         Behavior: Behavior normal.         Thought Content: Thought content normal.         Judgment: Judgment normal.         Laboratory Data:  Labs have been reviewed.    Lab Results   Component Value Date    WBC 4.24 08/06/2024    HGB 8.8 (L) 08/06/2024    HCT 31.0 (L) 08/06/2024    MCV 64 (L) 08/06/2024     08/06/2024           Diagnostics:   Colonoscopy (2/6/24): normal    Upper GI endoscopy (5/17/23):  - Normal esophagus.                          - A Billroth I anastomosis was found,                          characterized by friable mucosa and  erythema.                          - Normal examined jejunum. Biopsied.                          Billroth 1 with erythema and friable gastric                          mucosa but without bleeding, but some oozing on                          contact. The rest of the exam normal     Imaging:    Assessment:       1. Iron deficiency anemia due to chronic blood loss    2. Iron deficiency anemia, unspecified iron deficiency anemia type    3. History of partial gastrectomy    4. Microcytic anemia           Plan:     Iron deficiency anemia / microcytic anemia / history of partial gastrectomy  - I have reviewed his chart  - labs since 2015 reveal a mild-to-severe microcytic anemia  - iron studies (8/6/24) revealed a decreased ferritin/iron with elevated total iron binding capacity.  - he had partial gastrectomy in 1997, so perhaps his iron deficiency is partially acquired secondary to poor absorption.  - colonoscopy (2/6/24) was normal.  - Upper GI endoscopy (5/17/23): Billroth 1 with erythema and friable gastric   mucosa but without bleeding, but some oozing on contact.   - recommend IV iron. We will get it arranged.  - continue oral iron.  - return to clinic in 4 months with repeat labs.        - return to clinic in 4 months with repeat labs.    Eddie Avelar M.D.  Hematology/Oncology  [facility] - [address], [address]  [place] [address]  Phone: [phone]  Fax: [phone]

## 2024-09-18 ENCOUNTER — OFFICE VISIT (OUTPATIENT)
Dept: HEMATOLOGY/ONCOLOGY | Facility: CLINIC | Age: 69
End: 2024-09-18
Payer: MEDICARE

## 2024-09-18 VITALS
DIASTOLIC BLOOD PRESSURE: 57 MMHG | BODY MASS INDEX: 23.63 KG/M2 | HEART RATE: 61 BPM | SYSTOLIC BLOOD PRESSURE: 102 MMHG | WEIGHT: 164.69 LBS | OXYGEN SATURATION: 100 %

## 2024-09-18 DIAGNOSIS — D50.0 IRON DEFICIENCY ANEMIA DUE TO CHRONIC BLOOD LOSS: Primary | ICD-10-CM

## 2024-09-18 DIAGNOSIS — Z90.3 HISTORY OF PARTIAL GASTRECTOMY: ICD-10-CM

## 2024-09-18 DIAGNOSIS — D50.9 IRON DEFICIENCY ANEMIA, UNSPECIFIED IRON DEFICIENCY ANEMIA TYPE: ICD-10-CM

## 2024-09-18 DIAGNOSIS — D50.9 MICROCYTIC ANEMIA: ICD-10-CM

## 2024-09-18 PROCEDURE — 99999 PR PBB SHADOW E&M-EST. PATIENT-LVL III: CPT | Mod: PBBFAC,,, | Performed by: INTERNAL MEDICINE

## 2024-09-18 PROCEDURE — 3288F FALL RISK ASSESSMENT DOCD: CPT | Mod: CPTII,S$GLB,, | Performed by: INTERNAL MEDICINE

## 2024-09-18 PROCEDURE — 3008F BODY MASS INDEX DOCD: CPT | Mod: CPTII,S$GLB,, | Performed by: INTERNAL MEDICINE

## 2024-09-18 PROCEDURE — 3074F SYST BP LT 130 MM HG: CPT | Mod: CPTII,S$GLB,, | Performed by: INTERNAL MEDICINE

## 2024-09-18 PROCEDURE — 3078F DIAST BP <80 MM HG: CPT | Mod: CPTII,S$GLB,, | Performed by: INTERNAL MEDICINE

## 2024-09-18 PROCEDURE — 1160F RVW MEDS BY RX/DR IN RCRD: CPT | Mod: CPTII,S$GLB,, | Performed by: INTERNAL MEDICINE

## 2024-09-18 PROCEDURE — 99204 OFFICE O/P NEW MOD 45 MIN: CPT | Mod: S$GLB,,, | Performed by: INTERNAL MEDICINE

## 2024-09-18 PROCEDURE — 1159F MED LIST DOCD IN RCRD: CPT | Mod: CPTII,S$GLB,, | Performed by: INTERNAL MEDICINE

## 2024-09-18 PROCEDURE — 1126F AMNT PAIN NOTED NONE PRSNT: CPT | Mod: CPTII,S$GLB,, | Performed by: INTERNAL MEDICINE

## 2024-09-18 PROCEDURE — 1158F ADVNC CARE PLAN TLK DOCD: CPT | Mod: CPTII,S$GLB,, | Performed by: INTERNAL MEDICINE

## 2024-09-18 PROCEDURE — 3044F HG A1C LEVEL LT 7.0%: CPT | Mod: CPTII,S$GLB,, | Performed by: INTERNAL MEDICINE

## 2024-09-18 PROCEDURE — 1101F PT FALLS ASSESS-DOCD LE1/YR: CPT | Mod: CPTII,S$GLB,, | Performed by: INTERNAL MEDICINE

## 2024-09-18 RX ORDER — SODIUM CHLORIDE 0.9 % (FLUSH) 0.9 %
10 SYRINGE (ML) INJECTION
OUTPATIENT
Start: 2024-10-08

## 2024-09-18 RX ORDER — HEPARIN 100 UNIT/ML
500 SYRINGE INTRAVENOUS
OUTPATIENT
Start: 2024-10-01

## 2024-09-18 RX ORDER — SODIUM CHLORIDE 0.9 % (FLUSH) 0.9 %
10 SYRINGE (ML) INJECTION
OUTPATIENT
Start: 2024-09-24

## 2024-09-18 RX ORDER — HEPARIN 100 UNIT/ML
500 SYRINGE INTRAVENOUS
OUTPATIENT
Start: 2024-10-15

## 2024-09-18 RX ORDER — FERROUS SULFATE 325(65) MG
325 TABLET ORAL
Qty: 90 TABLET | Refills: 3 | Status: SHIPPED | OUTPATIENT
Start: 2024-09-18

## 2024-09-18 RX ORDER — SODIUM CHLORIDE 0.9 % (FLUSH) 0.9 %
10 SYRINGE (ML) INJECTION
OUTPATIENT
Start: 2024-10-15

## 2024-09-18 RX ORDER — SODIUM CHLORIDE 0.9 % (FLUSH) 0.9 %
10 SYRINGE (ML) INJECTION
OUTPATIENT
Start: 2024-10-01

## 2024-09-18 RX ORDER — HEPARIN 100 UNIT/ML
500 SYRINGE INTRAVENOUS
OUTPATIENT
Start: 2024-10-08

## 2024-09-18 RX ORDER — HEPARIN 100 UNIT/ML
500 SYRINGE INTRAVENOUS
OUTPATIENT
Start: 2024-09-24

## 2024-09-23 ENCOUNTER — TELEPHONE (OUTPATIENT)
Dept: INFUSION THERAPY | Facility: HOSPITAL | Age: 69
End: 2024-09-23
Payer: MEDICARE

## 2024-09-23 NOTE — TELEPHONE ENCOUNTER
Confirmed upcoming infusion appointments with the patient. Reviewed pre infusion instructions with the patient. Declined to schedule on 9/24 or 9/25 due to requesting afternoon appt  10/2 at 1230p  10/9 at 1230p  10/16 at 1230p  10/23 at 1230p

## 2024-10-02 ENCOUNTER — TELEPHONE (OUTPATIENT)
Dept: PSYCHIATRY | Facility: CLINIC | Age: 69
End: 2024-10-02
Payer: MEDICARE

## 2024-10-02 ENCOUNTER — INFUSION (OUTPATIENT)
Dept: INFUSION THERAPY | Facility: HOSPITAL | Age: 69
End: 2024-10-02
Attending: INTERNAL MEDICINE
Payer: MEDICARE

## 2024-10-02 VITALS
DIASTOLIC BLOOD PRESSURE: 57 MMHG | SYSTOLIC BLOOD PRESSURE: 110 MMHG | OXYGEN SATURATION: 97 % | HEART RATE: 52 BPM | TEMPERATURE: 98 F | RESPIRATION RATE: 18 BRPM

## 2024-10-02 DIAGNOSIS — F06.30 MOOD DISORDER DUE TO MEDICAL CONDITION: Primary | ICD-10-CM

## 2024-10-02 DIAGNOSIS — D50.0 IRON DEFICIENCY ANEMIA DUE TO CHRONIC BLOOD LOSS: Primary | ICD-10-CM

## 2024-10-02 PROCEDURE — 63600175 PHARM REV CODE 636 W HCPCS: Performed by: INTERNAL MEDICINE

## 2024-10-02 PROCEDURE — A4216 STERILE WATER/SALINE, 10 ML: HCPCS | Performed by: INTERNAL MEDICINE

## 2024-10-02 PROCEDURE — 96365 THER/PROPH/DIAG IV INF INIT: CPT

## 2024-10-02 PROCEDURE — 25000003 PHARM REV CODE 250: Performed by: INTERNAL MEDICINE

## 2024-10-02 RX ORDER — SODIUM CHLORIDE 0.9 % (FLUSH) 0.9 %
10 SYRINGE (ML) INJECTION
Status: DISCONTINUED | OUTPATIENT
Start: 2024-10-02 | End: 2024-10-02 | Stop reason: HOSPADM

## 2024-10-02 RX ADMIN — SODIUM CHLORIDE: 9 INJECTION, SOLUTION INTRAVENOUS at 12:10

## 2024-10-02 RX ADMIN — Medication 10 ML: at 02:10

## 2024-10-02 RX ADMIN — SODIUM CHLORIDE 125 MG: 9 INJECTION, SOLUTION INTRAVENOUS at 12:10

## 2024-10-02 NOTE — NURSING
Pt tolerated Ferrlecit  dose 1 of 4 infusion well.  No adverse reaction noted. During and post infusion observation.   IV flushed with NS and D/C per protocol.  Patient left clinic in no acute distress.

## 2024-10-03 ENCOUNTER — TELEPHONE (OUTPATIENT)
Dept: HEMATOLOGY/ONCOLOGY | Facility: CLINIC | Age: 69
End: 2024-10-03
Payer: MEDICARE

## 2024-10-03 NOTE — TELEPHONE ENCOUNTER
----- Message from LEFTY Godinez sent at 10/3/2024 10:42 AM CDT -----  Good morning yall, pt's brother is calling again regarding the referral. Please advise.  ----- Message -----  From: Tanja Wilson  Sent: 10/3/2024  10:39 AM CDT  To: [facility] Staff    Type:  Needs Medical Advice    Who Called: Pt's brother   Symptoms (please be specific): caller states that he would like to know why pt received a referral for psychology    Would the patient rather a call back or a response via [facility]? Call back   Best Call Back Number: [phone]

## 2024-10-03 NOTE — TELEPHONE ENCOUNTER
I called and spoke to his brother.    Eddie Avelar M.D.  Hematology/Oncology  [facility] - [address], [address]  [place] [address]  Phone: [phone]  Fax: [phone]

## 2024-10-07 ENCOUNTER — TELEPHONE (OUTPATIENT)
Dept: PSYCHIATRY | Facility: CLINIC | Age: 69
End: 2024-10-07
Payer: MEDICARE

## 2024-10-09 ENCOUNTER — INFUSION (OUTPATIENT)
Dept: INFUSION THERAPY | Facility: HOSPITAL | Age: 69
End: 2024-10-09
Attending: INTERNAL MEDICINE
Payer: MEDICARE

## 2024-10-09 VITALS
OXYGEN SATURATION: 98 % | HEART RATE: 64 BPM | TEMPERATURE: 98 F | DIASTOLIC BLOOD PRESSURE: 58 MMHG | SYSTOLIC BLOOD PRESSURE: 120 MMHG | RESPIRATION RATE: 18 BRPM

## 2024-10-09 DIAGNOSIS — D50.0 IRON DEFICIENCY ANEMIA DUE TO CHRONIC BLOOD LOSS: Primary | ICD-10-CM

## 2024-10-09 PROCEDURE — A4216 STERILE WATER/SALINE, 10 ML: HCPCS | Performed by: INTERNAL MEDICINE

## 2024-10-09 PROCEDURE — 63600175 PHARM REV CODE 636 W HCPCS: Performed by: INTERNAL MEDICINE

## 2024-10-09 PROCEDURE — 25000003 PHARM REV CODE 250: Performed by: INTERNAL MEDICINE

## 2024-10-09 PROCEDURE — 96365 THER/PROPH/DIAG IV INF INIT: CPT

## 2024-10-09 RX ORDER — SODIUM CHLORIDE 0.9 % (FLUSH) 0.9 %
10 SYRINGE (ML) INJECTION
Status: DISCONTINUED | OUTPATIENT
Start: 2024-10-09 | End: 2024-10-09 | Stop reason: HOSPADM

## 2024-10-09 RX ADMIN — SODIUM CHLORIDE 125 MG: 9 INJECTION, SOLUTION INTRAVENOUS at 12:10

## 2024-10-09 RX ADMIN — Medication 10 ML: at 02:10

## 2024-10-09 RX ADMIN — Medication 10 ML: at 12:10

## 2024-10-09 NOTE — PLAN OF CARE
Pt tolerated Ferrlecit 2 of 4 infusion well.  No adverse reaction noted. No complaints at this time. PIV flushed with NS and D/C per protocol.  Patient left clinic in no acute distress.

## 2024-10-11 ENCOUNTER — TELEPHONE (OUTPATIENT)
Dept: PSYCHIATRY | Facility: CLINIC | Age: 69
End: 2024-10-11
Payer: MEDICARE

## 2024-10-16 ENCOUNTER — INFUSION (OUTPATIENT)
Dept: INFUSION THERAPY | Facility: HOSPITAL | Age: 69
End: 2024-10-16
Attending: INTERNAL MEDICINE
Payer: MEDICARE

## 2024-10-16 VITALS
DIASTOLIC BLOOD PRESSURE: 56 MMHG | HEART RATE: 54 BPM | RESPIRATION RATE: 16 BRPM | TEMPERATURE: 98 F | SYSTOLIC BLOOD PRESSURE: 119 MMHG | OXYGEN SATURATION: 100 %

## 2024-10-16 DIAGNOSIS — D50.0 IRON DEFICIENCY ANEMIA DUE TO CHRONIC BLOOD LOSS: Primary | ICD-10-CM

## 2024-10-16 PROCEDURE — 96365 THER/PROPH/DIAG IV INF INIT: CPT

## 2024-10-16 PROCEDURE — 63600175 PHARM REV CODE 636 W HCPCS: Performed by: INTERNAL MEDICINE

## 2024-10-16 PROCEDURE — 25000003 PHARM REV CODE 250: Performed by: INTERNAL MEDICINE

## 2024-10-16 PROCEDURE — A4216 STERILE WATER/SALINE, 10 ML: HCPCS | Performed by: INTERNAL MEDICINE

## 2024-10-16 RX ORDER — SODIUM CHLORIDE 0.9 % (FLUSH) 0.9 %
10 SYRINGE (ML) INJECTION
Status: DISCONTINUED | OUTPATIENT
Start: 2024-10-16 | End: 2024-10-16 | Stop reason: HOSPADM

## 2024-10-16 RX ADMIN — Medication 10 ML: at 12:10

## 2024-10-16 RX ADMIN — Medication 10 ML: at 02:10

## 2024-10-16 RX ADMIN — SODIUM CHLORIDE 125 MG: 9 INJECTION, SOLUTION INTRAVENOUS at 12:10

## 2024-10-16 NOTE — PLAN OF CARE
Patient tolerated Ferrlecit infusion well. No s/s adverse reaction. Next appt reviewed with patient. PIV removed and patient ambulated out of clinic in NAD.

## 2024-10-17 ENCOUNTER — LAB VISIT (OUTPATIENT)
Dept: LAB | Facility: HOSPITAL | Age: 69
End: 2024-10-17
Attending: INTERNAL MEDICINE
Payer: MEDICARE

## 2024-10-17 DIAGNOSIS — D50.0 IRON DEFICIENCY ANEMIA DUE TO CHRONIC BLOOD LOSS: ICD-10-CM

## 2024-10-17 LAB
BASOPHILS # BLD AUTO: 0.04 K/UL (ref 0–0.2)
BASOPHILS NFR BLD: 0.8 % (ref 0–1.9)
DIFFERENTIAL METHOD BLD: ABNORMAL
EOSINOPHIL # BLD AUTO: 0.1 K/UL (ref 0–0.5)
EOSINOPHIL NFR BLD: 2.5 % (ref 0–8)
ERYTHROCYTE [DISTWIDTH] IN BLOOD BY AUTOMATED COUNT: 25.7 % (ref 11.5–14.5)
FERRITIN SERPL-MCNC: 49 NG/ML (ref 20–300)
HCT VFR BLD AUTO: 35.9 % (ref 40–54)
HGB BLD-MCNC: 10.2 G/DL (ref 14–18)
IMM GRANULOCYTES # BLD AUTO: 0.01 K/UL (ref 0–0.04)
IMM GRANULOCYTES NFR BLD AUTO: 0.2 % (ref 0–0.5)
IRON SERPL-MCNC: 132 UG/DL (ref 45–160)
LYMPHOCYTES # BLD AUTO: 1 K/UL (ref 1–4.8)
LYMPHOCYTES NFR BLD: 20.1 % (ref 18–48)
MCH RBC QN AUTO: 20 PG (ref 27–31)
MCHC RBC AUTO-ENTMCNC: 28.4 G/DL (ref 32–36)
MCV RBC AUTO: 71 FL (ref 82–98)
MONOCYTES # BLD AUTO: 0.7 K/UL (ref 0.3–1)
MONOCYTES NFR BLD: 14 % (ref 4–15)
NEUTROPHILS # BLD AUTO: 3 K/UL (ref 1.8–7.7)
NEUTROPHILS NFR BLD: 62.4 % (ref 38–73)
NRBC BLD-RTO: 0 /100 WBC
PLATELET # BLD AUTO: 254 K/UL (ref 150–450)
PMV BLD AUTO: 10.4 FL (ref 9.2–12.9)
RBC # BLD AUTO: 5.09 M/UL (ref 4.6–6.2)
SATURATED IRON: 29 % (ref 20–50)
TOTAL IRON BINDING CAPACITY: 453 UG/DL (ref 250–450)
TRANSFERRIN SERPL-MCNC: 306 MG/DL (ref 200–375)
WBC # BLD AUTO: 4.77 K/UL (ref 3.9–12.7)

## 2024-10-17 PROCEDURE — 36415 COLL VENOUS BLD VENIPUNCTURE: CPT | Performed by: INTERNAL MEDICINE

## 2024-10-17 PROCEDURE — 83540 ASSAY OF IRON: CPT | Performed by: INTERNAL MEDICINE

## 2024-10-17 PROCEDURE — 82728 ASSAY OF FERRITIN: CPT | Performed by: INTERNAL MEDICINE

## 2024-10-17 PROCEDURE — 85025 COMPLETE CBC W/AUTO DIFF WBC: CPT | Performed by: INTERNAL MEDICINE

## 2024-10-23 ENCOUNTER — INFUSION (OUTPATIENT)
Dept: INFUSION THERAPY | Facility: HOSPITAL | Age: 69
End: 2024-10-23
Attending: INTERNAL MEDICINE
Payer: MEDICARE

## 2024-10-23 VITALS
TEMPERATURE: 98 F | DIASTOLIC BLOOD PRESSURE: 59 MMHG | SYSTOLIC BLOOD PRESSURE: 123 MMHG | HEART RATE: 66 BPM | RESPIRATION RATE: 18 BRPM | OXYGEN SATURATION: 98 %

## 2024-10-23 DIAGNOSIS — D50.0 IRON DEFICIENCY ANEMIA DUE TO CHRONIC BLOOD LOSS: Primary | ICD-10-CM

## 2024-10-23 PROCEDURE — 63600175 PHARM REV CODE 636 W HCPCS: Performed by: INTERNAL MEDICINE

## 2024-10-23 PROCEDURE — A4216 STERILE WATER/SALINE, 10 ML: HCPCS | Performed by: INTERNAL MEDICINE

## 2024-10-23 PROCEDURE — 25000003 PHARM REV CODE 250: Performed by: INTERNAL MEDICINE

## 2024-10-23 PROCEDURE — 96365 THER/PROPH/DIAG IV INF INIT: CPT

## 2024-10-23 RX ORDER — SODIUM CHLORIDE 0.9 % (FLUSH) 0.9 %
10 SYRINGE (ML) INJECTION
Status: DISCONTINUED | OUTPATIENT
Start: 2024-10-23 | End: 2024-10-23 | Stop reason: HOSPADM

## 2024-10-23 RX ORDER — HEPARIN 100 UNIT/ML
500 SYRINGE INTRAVENOUS
Status: DISCONTINUED | OUTPATIENT
Start: 2024-10-23 | End: 2024-10-23 | Stop reason: HOSPADM

## 2024-10-23 RX ADMIN — Medication 10 ML: at 02:10

## 2024-10-23 RX ADMIN — SODIUM CHLORIDE 125 MG: 9 INJECTION, SOLUTION INTRAVENOUS at 12:10

## 2024-10-23 NOTE — PLAN OF CARE
Pt received IV Ferrlecit infusion dose 4/4. Pt tolerated it well. AVS given. Pt will follow up with MD. Discharged with no acute distress.

## 2025-01-03 ENCOUNTER — OFFICE VISIT (OUTPATIENT)
Dept: FAMILY MEDICINE | Facility: CLINIC | Age: 70
End: 2025-01-03
Payer: MEDICARE

## 2025-01-03 VITALS
HEIGHT: 70 IN | OXYGEN SATURATION: 98 % | TEMPERATURE: 98 F | SYSTOLIC BLOOD PRESSURE: 148 MMHG | WEIGHT: 156.31 LBS | BODY MASS INDEX: 22.38 KG/M2 | HEART RATE: 80 BPM | DIASTOLIC BLOOD PRESSURE: 84 MMHG

## 2025-01-03 DIAGNOSIS — F31.9 BIPOLAR AFFECTIVE DISORDER, REMISSION STATUS UNSPECIFIED: Chronic | ICD-10-CM

## 2025-01-03 DIAGNOSIS — Z12.5 SCREENING FOR PROSTATE CANCER: ICD-10-CM

## 2025-01-03 DIAGNOSIS — N52.9 ERECTILE DYSFUNCTION, UNSPECIFIED ERECTILE DYSFUNCTION TYPE: Primary | Chronic | ICD-10-CM

## 2025-01-03 DIAGNOSIS — I10 PRIMARY HYPERTENSION: ICD-10-CM

## 2025-01-03 PROCEDURE — 99214 OFFICE O/P EST MOD 30 MIN: CPT | Mod: S$GLB,,, | Performed by: FAMILY MEDICINE

## 2025-01-03 PROCEDURE — 3079F DIAST BP 80-89 MM HG: CPT | Mod: CPTII,S$GLB,, | Performed by: FAMILY MEDICINE

## 2025-01-03 PROCEDURE — 3077F SYST BP >= 140 MM HG: CPT | Mod: CPTII,S$GLB,, | Performed by: FAMILY MEDICINE

## 2025-01-03 PROCEDURE — 3288F FALL RISK ASSESSMENT DOCD: CPT | Mod: CPTII,S$GLB,, | Performed by: FAMILY MEDICINE

## 2025-01-03 PROCEDURE — 99999 PR PBB SHADOW E&M-EST. PATIENT-LVL III: CPT | Mod: PBBFAC,,, | Performed by: FAMILY MEDICINE

## 2025-01-03 PROCEDURE — 1126F AMNT PAIN NOTED NONE PRSNT: CPT | Mod: CPTII,S$GLB,, | Performed by: FAMILY MEDICINE

## 2025-01-03 PROCEDURE — 1101F PT FALLS ASSESS-DOCD LE1/YR: CPT | Mod: CPTII,S$GLB,, | Performed by: FAMILY MEDICINE

## 2025-01-03 PROCEDURE — 3008F BODY MASS INDEX DOCD: CPT | Mod: CPTII,S$GLB,, | Performed by: FAMILY MEDICINE

## 2025-01-06 NOTE — PROGRESS NOTES
(Portions of this note were dictated using voice recognition software and may contain dictation related errors in spelling/grammar/syntax not found on text review)    CC: No chief complaint on file.      HPI: 69 y.o. male, pt of Dr Licona, presented for evaluation of erectile dysfunction as a new patient to me.  He has medical history significant for bipolar depression, gastric ulcer, essential hypertension, pleural effusion, seizure disorder.    Patient reports that he has noticed erectile dysfunction and inability to perform sexually, he follows up with Psychiatry for management of bipolar disorder, takes Abilify, he read the literature with says that it can be possible side effects of his medication, he discussed the problem with the psychiatrist as a possible cause of his symptoms, he was suggested to have his testosterone and prostate levels checked.    He has history of hypertension, however, does not take any regular prescription medications.    He is otherwise feeling fine and denies having any other symptoms or concerns.    Past Medical History:   Diagnosis Date    Depression     [facility]    Gastric ulcer 01/01/1997    surgery in 1997    Hypertension     Occult GI bleeding 02/13/2015    Pleural effusion 05/17/2023    Seizure disorder        Past Surgical History:   Procedure Laterality Date    COLONOSCOPY N/A 5/17/2023    Procedure: COLONOSCOPY;  Surgeon: Alberto Alfonso MD;  Location: [facility];  Service: Endoscopy;  Laterality: N/A;    COLONOSCOPY N/A 2/6/2024    Procedure: COLONOSCOPY;  Surgeon: Alberto Alfonso MD;  Location: [facility];  Service: Endoscopy;  Laterality: N/A;    ESOPHAGOGASTRODUODENOSCOPY N/A 5/17/2023    Procedure: EGD (ESOPHAGOGASTRODUODENOSCOPY);  Surgeon: Alberto Alfonso MD;  Location: [facility];  Service: Endoscopy;  Laterality: N/A;    stomach surgery  1997    for perforated gastric ulcer       Family History   Problem Relation Name Age of Onset    Hypertension Father       Hypertension Brother         Social History     Tobacco Use    Smoking status: Former     Current packs/day: 0.00     Average packs/day: 1.5 packs/day for 20.0 years (30.0 ttl pk-yrs)     Types: Cigarettes     Start date: 3/2/1977     Quit date: 3/2/1997     Years since quittin.8   Substance Use Topics    Alcohol use: No    Drug use: No       Lab Results   Component Value Date    WBC 4.77 10/17/2024    HGB 10.2 (L) 10/17/2024    HCT 35.9 (L) 10/17/2024    MCV 71 (L) 10/17/2024     10/17/2024    CHOL 156 2024    TRIG 39 2024    HDL 66 2024    ALT 12 2024    AST 16 2024    BILITOT 0.2 2024    ALKPHOS 105 2024     2024    K 4.7 2024     2024    CREATININE 1.2 2024    ESTGFRAFRICA >60 2018    EGFRNONAA 59 (A) 2018    CALCIUM 9.5 2024    ALBUMIN 3.6 2024    BUN 16 2024    CO2 23 2024    TSH 1.691 2024    HGBA1C 5.8 (H) 2024    LDLCALC 82.2 2024     2024    [id number] 42 2018             Vital signs reviewed  PE:   APPEARANCE: In no acute distress.    HEAD: Normocephalic, atraumatic.  EYES: EOMI.  Conjunctivae noninjected.  NOSE: Mucosa pink. Airway clear.  NECK: Supple with no cervical lymphadenopathy.    CHEST: Good inspiratory effort. Lungs clear to auscultation with no wheezes or crackles.  CARDIOVASCULAR: Normal S1, S2. No rubs, murmurs, or gallops.  ABDOMEN: Bowel sounds normal. Not distended. Soft. No tenderness or masses. No organomegaly.  EXTREMITIES: No edema, cyanosis, or clubbing.    Review of Systems   Constitutional:  Negative for chills, fatigue and fever.   HENT: Negative.     Respiratory:  Negative for cough, shortness of breath and wheezing.    Cardiovascular:  Negative for chest pain, palpitations and leg swelling.   Gastrointestinal: Negative.    Genitourinary: Negative.    Musculoskeletal: Negative.    Neurological: Negative.     Psychiatric/Behavioral: Negative.     All other systems reviewed and are negative.      IMPRESSION  1. Erectile dysfunction, unspecified erectile dysfunction type    2. Screening for prostate cancer    3. Bipolar affective disorder, remission status unspecified    4. Primary hypertension            PLAN      1. Erectile dysfunction, unspecified erectile dysfunction type (Primary)    - TESTOSTERONE; Future      2. Screening for prostate cancer    - PSA, Screening; Future      3. Bipolar affective disorder, remission status unspecified    Followed by psychiatry    His symptoms can be related to side effects of the medication, encouraged to follow up with the Psychiatry and discuss alternative medications for bipolar disorder      4. Primary hypertension    Elevated    Low-salt diet, regular exercise     Advised to monitor blood pressure at home     Advised to follow up with PCP for blood pressure         SCREENINGS        Age/demographic appropriate health maintenance:    Health Maintenance Due   Topic Date Due    Shingles Vaccine (2 of 2) 10/07/2024           Spent adequate time in obtaining history and explaining differentials     30 minutes spent during this visit of which greater than 50% devoted to face-face counseling and coordination of care regarding diagnosis and management plan       Marisol Dalton

## 2025-01-10 ENCOUNTER — TELEPHONE (OUTPATIENT)
Dept: FAMILY MEDICINE | Facility: CLINIC | Age: 70
End: 2025-01-10
Payer: MEDICARE

## 2025-01-10 NOTE — TELEPHONE ENCOUNTER
----- Message from Tanja sent at 1/9/2025  4:05 PM CST -----  Type:  Needs Medical Advice    Who Called: Pt   Symptoms (please be specific): would like to speak to dr or nurse regarding upcoming labs scheduled    Would the patient rather a call back or a response via [facility]? Call back   Best Call Back Number: [phone]

## 2025-01-10 NOTE — PROGRESS NOTES
PATIENT: Nacho Gusman  MRN: [id number]  DATE: 1/15/2025    Diagnosis:   1. Iron deficiency anemia due to chronic blood loss    2. Iron deficiency anemia, unspecified iron deficiency anemia type    3. History of partial gastrectomy    4. Erectile dysfunction, unspecified erectile dysfunction type      Chief Complaint: iron deficiency    Oncologic History:      Oncologic History     Oncologic Treatment     Pathology       Subjective:    History of Present Illness: Mr. Gusman is a 69 y.o. male who presented in September 2024 for evaluation and management of iron deficiency anemia. He was referred by Dr. Russell.    - labs since 2015 reveal a mild-to-severe microcytic anemia  - iron studies (8/6/24) revealed a decreased ferritin/iron with elevated total iron binding capacity.  - history of partial gastrectomy in 1997.    - he is taking oral iron    Interval history:  - he presents for a follow-up appointment for his iron deficiency anemia.  - he received ferric gluconate x 4 doses in September/October 2024.  - he reports improvement in his fatigue. He denies shortness of breath, chest pain, nausea, vomiting, diarrhea, constipation. He endorses decreased sex drive/erectile dysfunction.        Past medical, surgical, family, and social histories have been reviewed and updated below.    Past Medical History:   Past Medical History:   Diagnosis Date    Depression     [facility]    Gastric ulcer 01/01/1997    surgery in 1997    Hypertension     Occult GI bleeding 02/13/2015    Pleural effusion 05/17/2023    Seizure disorder        Past Surgical History:   Past Surgical History:   Procedure Laterality Date    COLONOSCOPY N/A 5/17/2023    Procedure: COLONOSCOPY;  Surgeon: Alberto Alfonso MD;  Location: [facility];  Service: Endoscopy;  Laterality: N/A;    COLONOSCOPY N/A 2/6/2024    Procedure: COLONOSCOPY;  Surgeon: Alberto Alfonso MD;  Location: [facility];  Service: Endoscopy;  Laterality: N/A;     ESOPHAGOGASTRODUODENOSCOPY N/A 5/17/2023    Procedure: EGD (ESOPHAGOGASTRODUODENOSCOPY);  Surgeon: Alberto Alfonso MD;  Location: [facility];  Service: Endoscopy;  Laterality: N/A;    stomach surgery  1997    for perforated gastric ulcer       Family History:   Family History   Problem Relation Name Age of Onset    Hypertension Father      Hypertension Brother         Social History:  reports that he quit smoking about 27 years ago. His smoking use included cigarettes. He started smoking about 47 years ago. He has a 30 pack-year smoking history. He does not have any smokeless tobacco history on file. He reports that he does not drink alcohol and does not use drugs.    Allergies:  Review of patient's allergies indicates:  No Known Allergies    Medications:  Current Outpatient Medications   Medication Sig Dispense Refill    ascorbic acid, vitamin C, (VITAMIN C) 500 MG tablet Take 500 mg by mouth once daily.      co-enzyme Q-10 30 mg capsule Take 30 mg by mouth once daily.      ferrous sulfate (IRON) 325 mg (65 mg iron) Tab tablet Take 1 tablet (325 mg total) by mouth daily with breakfast. 90 tablet 3    folic acid (FOLVITE) 400 MCG tablet Take 400 mcg by mouth once daily.      magnesium oxide (MAG-OX) 400 mg (241.3 mg magnesium) tablet Take 400 mg by mouth once daily.      omega 3-dha-epa-fish oil 1,200 (144-216) mg Cap Take 1,200 mg by mouth 2 (two) times daily.      pantoprazole (PROTONIX) 40 MG tablet Take 1 tablet (40 mg total) by mouth once daily. 60 tablet 3    vitamin E 400 UNIT capsule Take 400 Units by mouth once daily.       No current facility-administered medications for this visit.     Facility-Administered Medications Ordered in Other Visits   Medication Dose Route Frequency Provider Last Rate Last Admin    0.9%  NaCl infusion   Intravenous Continuous Alberto Alfonso MD 20 mL/hr at 02/06/24 1011 New Bag at 02/06/24 1011    sodium chloride 0.9% flush 10 mL  10 mL Intravenous PRN Alberto Alfonso MD            Review of Systems   Constitutional:  Negative for fatigue.   HENT:  Negative for sore throat.    Eyes:  Negative for visual disturbance.   Respiratory:  Negative for shortness of breath.    Cardiovascular:  Negative for chest pain.   Gastrointestinal:  Negative for abdominal pain.   Genitourinary:  Negative for dysuria.   Musculoskeletal:  Negative for back pain.   Skin:  Negative for rash.   Neurological:  Negative for headaches.   Hematological:  Negative for adenopathy.   Psychiatric/Behavioral:  The patient is not nervous/anxious.        ECOG Performance Status:   ECOG SCORE    0 - Fully active-able to carry on all pre-disease performance without restriction         Objective:      Vitals:   Vitals:    01/15/25 1403   BP: 133/81   BP Location: Left arm   Patient Position: Sitting   Pulse: 86   Resp: 16   SpO2: 100%   Weight: 70.1 kg (154 lb 8.7 oz)       BMI: Body mass index is 22.17 kg/m².    Physical Exam  Vitals and nursing note reviewed.   Constitutional:       Appearance: He is well-developed.   HENT:      Head: Normocephalic and atraumatic.   Eyes:      Pupils: Pupils are equal, round, and reactive to light.   Cardiovascular:      Rate and Rhythm: Normal rate and regular rhythm.   Pulmonary:      Effort: Pulmonary effort is normal.      Breath sounds: Normal breath sounds.   Abdominal:      General: Bowel sounds are normal.      Palpations: Abdomen is soft.   Musculoskeletal:         General: Normal range of motion.      Cervical back: Normal range of motion and neck supple.   Skin:     General: Skin is warm and dry.   Neurological:      Mental Status: He is alert and oriented to person, place, and time.   Psychiatric:         Behavior: Behavior normal.         Thought Content: Thought content normal.         Judgment: Judgment normal.         Laboratory Data:  Labs have been reviewed.    Lab Results   Component Value Date    WBC 7.39 01/14/2025    HGB 15.1 01/14/2025    HCT 46.9 01/14/2025    MCV 83  01/14/2025     01/14/2025     Lab Results   Component Value Date    FERRITIN 23 01/14/2025     Lab Results   Component Value Date    IRON 70 01/14/2025    TRANSFERRIN 262 01/14/2025    TIBC 388 01/14/2025    FESATURATED 18 (L) 01/14/2025            Diagnostics:   Colonoscopy (2/6/24): normal    Upper GI endoscopy (5/17/23):  - Normal esophagus.                          - A Billroth I anastomosis was found,                          characterized by friable mucosa and erythema.                          - Normal examined jejunum. Biopsied.                          Billroth 1 with erythema and friable gastric                          mucosa but without bleeding, but some oozing on                          contact. The rest of the exam normal     Imaging:    Assessment:       1. Iron deficiency anemia due to chronic blood loss    2. Iron deficiency anemia, unspecified iron deficiency anemia type    3. History of partial gastrectomy    4. Erectile dysfunction, unspecified erectile dysfunction type           Plan:     Iron deficiency anemia / microcytic anemia / history of partial gastrectomy  - I have reviewed his chart  - labs since 2015 reveal a mild-to-severe microcytic anemia  - iron studies (8/6/24) revealed a decreased ferritin/iron with elevated total iron binding capacity.  - he had partial gastrectomy in 1997, so perhaps his iron deficiency is partially acquired secondary to poor absorption.  - colonoscopy (2/6/24) was normal.  - Upper GI endoscopy (5/17/23): Billroth 1 with erythema and friable gastric   mucosa but without bleeding, but some oozing on contact.   - he received ferric gluconate x 4 doses in September/October 2024.  - Labs have been reviewed. Hemoglobin increased to 15.1 g/dL. Ferritin increased to 23 ng/mL.  - continue oral iron.  - return to clinic in 4 months with repeat labs.    2. Erectile dysfunction  - I sent in viagra per his request  - continue to monitor      - return to clinic in  4 months with repeat labs.    Eddie Avelar M.D.  Hematology/Oncology  [facility] - [address], [address]  [place] [address]  Phone: [phone]  Fax: [phone]

## 2025-01-11 ENCOUNTER — LAB VISIT (OUTPATIENT)
Dept: LAB | Facility: HOSPITAL | Age: 70
End: 2025-01-11
Attending: FAMILY MEDICINE
Payer: MEDICARE

## 2025-01-11 DIAGNOSIS — N52.9 ERECTILE DYSFUNCTION, UNSPECIFIED ERECTILE DYSFUNCTION TYPE: Chronic | ICD-10-CM

## 2025-01-11 DIAGNOSIS — Z12.5 SCREENING FOR PROSTATE CANCER: ICD-10-CM

## 2025-01-11 LAB
COMPLEXED PSA SERPL-MCNC: 0.55 NG/ML (ref 0–4)
TESTOST SERPL-MCNC: 551 NG/DL (ref 304–1227)

## 2025-01-11 PROCEDURE — 84403 ASSAY OF TOTAL TESTOSTERONE: CPT | Performed by: FAMILY MEDICINE

## 2025-01-11 PROCEDURE — 84153 ASSAY OF PSA TOTAL: CPT | Performed by: FAMILY MEDICINE

## 2025-01-11 PROCEDURE — 36415 COLL VENOUS BLD VENIPUNCTURE: CPT | Performed by: FAMILY MEDICINE

## 2025-01-14 ENCOUNTER — LAB VISIT (OUTPATIENT)
Dept: LAB | Facility: HOSPITAL | Age: 70
End: 2025-01-14
Attending: INTERNAL MEDICINE
Payer: MEDICARE

## 2025-01-14 DIAGNOSIS — D50.0 IRON DEFICIENCY ANEMIA DUE TO CHRONIC BLOOD LOSS: ICD-10-CM

## 2025-01-14 LAB
BASOPHILS # BLD AUTO: 0.05 K/UL (ref 0–0.2)
BASOPHILS NFR BLD: 0.7 % (ref 0–1.9)
DIFFERENTIAL METHOD BLD: ABNORMAL
EOSINOPHIL # BLD AUTO: 0.2 K/UL (ref 0–0.5)
EOSINOPHIL NFR BLD: 2.7 % (ref 0–8)
ERYTHROCYTE [DISTWIDTH] IN BLOOD BY AUTOMATED COUNT: 24.9 % (ref 11.5–14.5)
FERRITIN SERPL-MCNC: 23 NG/ML (ref 20–300)
HCT VFR BLD AUTO: 46.9 % (ref 40–54)
HGB BLD-MCNC: 15.1 G/DL (ref 14–18)
IMM GRANULOCYTES # BLD AUTO: 0.03 K/UL (ref 0–0.04)
IMM GRANULOCYTES NFR BLD AUTO: 0.4 % (ref 0–0.5)
IRON SERPL-MCNC: 70 UG/DL (ref 45–160)
LYMPHOCYTES # BLD AUTO: 2 K/UL (ref 1–4.8)
LYMPHOCYTES NFR BLD: 27.6 % (ref 18–48)
MCH RBC QN AUTO: 26.7 PG (ref 27–31)
MCHC RBC AUTO-ENTMCNC: 32.2 G/DL (ref 32–36)
MCV RBC AUTO: 83 FL (ref 82–98)
MONOCYTES # BLD AUTO: 0.7 K/UL (ref 0.3–1)
MONOCYTES NFR BLD: 9.6 % (ref 4–15)
NEUTROPHILS # BLD AUTO: 4.4 K/UL (ref 1.8–7.7)
NEUTROPHILS NFR BLD: 59 % (ref 38–73)
NRBC BLD-RTO: 0 /100 WBC
PLATELET # BLD AUTO: 255 K/UL (ref 150–450)
PMV BLD AUTO: 10.3 FL (ref 9.2–12.9)
RBC # BLD AUTO: 5.65 M/UL (ref 4.6–6.2)
SATURATED IRON: 18 % (ref 20–50)
TOTAL IRON BINDING CAPACITY: 388 UG/DL (ref 250–450)
TRANSFERRIN SERPL-MCNC: 262 MG/DL (ref 200–375)
WBC # BLD AUTO: 7.39 K/UL (ref 3.9–12.7)

## 2025-01-14 PROCEDURE — 82728 ASSAY OF FERRITIN: CPT | Performed by: INTERNAL MEDICINE

## 2025-01-14 PROCEDURE — 85025 COMPLETE CBC W/AUTO DIFF WBC: CPT | Performed by: INTERNAL MEDICINE

## 2025-01-14 PROCEDURE — 84466 ASSAY OF TRANSFERRIN: CPT | Performed by: INTERNAL MEDICINE

## 2025-01-14 PROCEDURE — 36415 COLL VENOUS BLD VENIPUNCTURE: CPT | Performed by: INTERNAL MEDICINE

## 2025-01-15 ENCOUNTER — TELEPHONE (OUTPATIENT)
Dept: FAMILY MEDICINE | Facility: CLINIC | Age: 70
End: 2025-01-15
Payer: MEDICARE

## 2025-01-15 ENCOUNTER — OFFICE VISIT (OUTPATIENT)
Dept: HEMATOLOGY/ONCOLOGY | Facility: CLINIC | Age: 70
End: 2025-01-15
Payer: MEDICARE

## 2025-01-15 VITALS
DIASTOLIC BLOOD PRESSURE: 81 MMHG | SYSTOLIC BLOOD PRESSURE: 133 MMHG | OXYGEN SATURATION: 100 % | BODY MASS INDEX: 22.17 KG/M2 | RESPIRATION RATE: 16 BRPM | HEART RATE: 86 BPM | WEIGHT: 154.56 LBS

## 2025-01-15 DIAGNOSIS — D50.0 IRON DEFICIENCY ANEMIA DUE TO CHRONIC BLOOD LOSS: Primary | ICD-10-CM

## 2025-01-15 DIAGNOSIS — N52.9 ERECTILE DYSFUNCTION, UNSPECIFIED ERECTILE DYSFUNCTION TYPE: Chronic | ICD-10-CM

## 2025-01-15 DIAGNOSIS — Z90.3 HISTORY OF PARTIAL GASTRECTOMY: ICD-10-CM

## 2025-01-15 DIAGNOSIS — D50.9 IRON DEFICIENCY ANEMIA, UNSPECIFIED IRON DEFICIENCY ANEMIA TYPE: ICD-10-CM

## 2025-01-15 PROCEDURE — 3008F BODY MASS INDEX DOCD: CPT | Mod: CPTII,S$GLB,, | Performed by: INTERNAL MEDICINE

## 2025-01-15 PROCEDURE — 1159F MED LIST DOCD IN RCRD: CPT | Mod: CPTII,S$GLB,, | Performed by: INTERNAL MEDICINE

## 2025-01-15 PROCEDURE — 3075F SYST BP GE 130 - 139MM HG: CPT | Mod: CPTII,S$GLB,, | Performed by: INTERNAL MEDICINE

## 2025-01-15 PROCEDURE — 3288F FALL RISK ASSESSMENT DOCD: CPT | Mod: CPTII,S$GLB,, | Performed by: INTERNAL MEDICINE

## 2025-01-15 PROCEDURE — 99214 OFFICE O/P EST MOD 30 MIN: CPT | Mod: S$GLB,,, | Performed by: INTERNAL MEDICINE

## 2025-01-15 PROCEDURE — 99999 PR PBB SHADOW E&M-EST. PATIENT-LVL III: CPT | Mod: PBBFAC,,, | Performed by: INTERNAL MEDICINE

## 2025-01-15 PROCEDURE — 1101F PT FALLS ASSESS-DOCD LE1/YR: CPT | Mod: CPTII,S$GLB,, | Performed by: INTERNAL MEDICINE

## 2025-01-15 PROCEDURE — 3079F DIAST BP 80-89 MM HG: CPT | Mod: CPTII,S$GLB,, | Performed by: INTERNAL MEDICINE

## 2025-01-15 PROCEDURE — 1160F RVW MEDS BY RX/DR IN RCRD: CPT | Mod: CPTII,S$GLB,, | Performed by: INTERNAL MEDICINE

## 2025-01-15 PROCEDURE — 1126F AMNT PAIN NOTED NONE PRSNT: CPT | Mod: CPTII,S$GLB,, | Performed by: INTERNAL MEDICINE

## 2025-01-15 RX ORDER — SILDENAFIL 50 MG/1
50 TABLET, FILM COATED ORAL DAILY PRN
Qty: 30 TABLET | Refills: 1 | Status: SHIPPED | OUTPATIENT
Start: 2025-01-15 | End: 2026-01-15

## 2025-01-15 NOTE — TELEPHONE ENCOUNTER
Patient is in the lobby and had Psa done and he said is normal and wants to know what is his next step about his erectile dysfunction . Please advice.

## 2025-01-16 ENCOUNTER — TELEPHONE (OUTPATIENT)
Dept: HEMATOLOGY/ONCOLOGY | Facility: CLINIC | Age: 70
End: 2025-01-16
Payer: MEDICARE

## 2025-01-16 NOTE — TELEPHONE ENCOUNTER
----- Message from Desi sent at 1/16/2025  9:19 AM CST -----  Type:  Needs Medical Advice/medication     Who Called: pt      Would the patient rather a call back or a response via [facility]? call  Best Call Back Number: [phone]  Additional Information: pt requesting a call back to discuss Viagra side effects doesn't give arousal and raises or drops the blood pressure.

## 2025-01-27 ENCOUNTER — OFFICE VISIT (OUTPATIENT)
Dept: UROLOGY | Facility: CLINIC | Age: 70
End: 2025-01-27
Payer: MEDICARE

## 2025-01-27 VITALS
SYSTOLIC BLOOD PRESSURE: 124 MMHG | BODY MASS INDEX: 21.41 KG/M2 | DIASTOLIC BLOOD PRESSURE: 87 MMHG | HEART RATE: 105 BPM | WEIGHT: 149.56 LBS | HEIGHT: 70 IN

## 2025-01-27 DIAGNOSIS — N52.01 ERECTILE DYSFUNCTION DUE TO ARTERIAL INSUFFICIENCY: Primary | Chronic | ICD-10-CM

## 2025-01-27 PROCEDURE — 3079F DIAST BP 80-89 MM HG: CPT | Mod: CPTII,S$GLB,,

## 2025-01-27 PROCEDURE — 1160F RVW MEDS BY RX/DR IN RCRD: CPT | Mod: CPTII,S$GLB,,

## 2025-01-27 PROCEDURE — 1159F MED LIST DOCD IN RCRD: CPT | Mod: CPTII,S$GLB,,

## 2025-01-27 PROCEDURE — 3074F SYST BP LT 130 MM HG: CPT | Mod: CPTII,S$GLB,,

## 2025-01-27 PROCEDURE — 1126F AMNT PAIN NOTED NONE PRSNT: CPT | Mod: CPTII,S$GLB,,

## 2025-01-27 PROCEDURE — 3008F BODY MASS INDEX DOCD: CPT | Mod: CPTII,S$GLB,,

## 2025-01-27 PROCEDURE — 99999 PR PBB SHADOW E&M-EST. PATIENT-LVL III: CPT | Mod: PBBFAC,,,

## 2025-01-27 PROCEDURE — 99203 OFFICE O/P NEW LOW 30 MIN: CPT | Mod: S$GLB,,,

## 2025-01-27 NOTE — PROGRESS NOTES
Subjective:       Patient ID: Nacho Gusman is a 69 y.o. male.    Chief Complaint: ED     This is a 69 y.o.  male patient that is new to me.  The patient was self referred  for Erectile dysfunction. Patient here with complaints of erectile dysfunction.  He endorses difficulty getting and maintaining erections.  He is not able to reliably achieve a strong enough erection for intercourse. Reports that he has had this problem for sometime and does not understand why he is having this issue. Reports a history of high blood pressure as well as 30 year smoking history and he quit about 20 years ago.  Denies use of nitroglycerin.  Total testosterone normal-- 551  Previous ED treatment: no, was prescribed viagra 50mg but has not tried taking the medication yet.         LAST PSA  Lab Results   Component Value Date    PSA 0.55 01/11/2025       Lab Results   Component Value Date    CREATININE 1.2 08/06/2024       ---  PMH/PSH/Medications/Allergies/Social history reviewed and as in chart.    Review of Systems   Constitutional:  Negative for activity change, chills and fever.   Respiratory:  Negative for shortness of breath.    Cardiovascular:  Negative for chest pain and palpitations.   Gastrointestinal:  Negative for abdominal pain and constipation.   Genitourinary:  Negative for difficulty urinating, dysuria, flank pain, frequency, hematuria and urgency.   Neurological:  Negative for dizziness and light-headedness.       Objective:      Physical Exam  HENT:      Head: Normocephalic.   Pulmonary:      Effort: Pulmonary effort is normal.   Musculoskeletal:         General: Normal range of motion.      Cervical back: Normal range of motion.   Skin:     General: Skin is warm and dry.   Neurological:      Mental Status: He is alert and oriented to person, place, and time.         Assessment:     Problem Noted   Erectile Dysfunction 1/2/2014    Viagra 50mg         Plan:     Erectile Dysfunction  - We discussed the etiology and  management of ED, including organic and psychogenic causes. First line therapy involves treatment with PDE-5 inhibitors.  - We discussed the risks and benefits of treatment with PDE5i's. Treatment with MATTHEW was also briefly discussed.   - Next line therapies would include intraurethral suppository (125-1000 ug), then intracavernosal injections (1 ml trimix papaverine 30 mg, phentolamine 1 mg, prostaglandin E1 10 ug). Ultimately, a penile prosthesis would be the final option.   -  advised patient to take viagra 50mg as prescribed by PCP. Take 1 hour prior to sexual intercourse on an empty stomach. Reviewed side effects of the medication. Patient voiced understanding.  Follow-up PRN for problems and concerns.     ETTA Rodriguez    I spent a total of 30 minutes on the day of the visit.This includes face to face time and non-face to face time preparing to see the patient (eg, review of tests), obtaining and/or reviewing separately obtained history, documenting clinical information in the electronic or other health record, independently interpreting results and communicating results to the patient/family/caregiver, or care coordinator.

## 2025-02-13 ENCOUNTER — TELEPHONE (OUTPATIENT)
Dept: FAMILY MEDICINE | Facility: CLINIC | Age: 70
End: 2025-02-13

## 2025-02-13 ENCOUNTER — HOSPITAL ENCOUNTER (EMERGENCY)
Facility: HOSPITAL | Age: 70
Discharge: HOME | End: 2025-02-13
Attending: EMERGENCY MEDICINE
Payer: MEDICARE

## 2025-02-13 VITALS
OXYGEN SATURATION: 99 % | RESPIRATION RATE: 19 BRPM | TEMPERATURE: 97 F | BODY MASS INDEX: 21.47 KG/M2 | HEART RATE: 72 BPM | DIASTOLIC BLOOD PRESSURE: 77 MMHG | HEIGHT: 70 IN | SYSTOLIC BLOOD PRESSURE: 134 MMHG | WEIGHT: 150 LBS

## 2025-02-13 DIAGNOSIS — R63.0 DECREASED APPETITE: Primary | ICD-10-CM

## 2025-02-13 DIAGNOSIS — F32.A DEPRESSION, UNSPECIFIED DEPRESSION TYPE: ICD-10-CM

## 2025-02-13 LAB
ABO + RH BLD: NORMAL
ALBUMIN SERPL BCP-MCNC: 3.6 G/DL (ref 3.5–5.2)
ALP SERPL-CCNC: 95 U/L (ref 40–150)
ALT SERPL W/O P-5'-P-CCNC: 22 U/L (ref 10–44)
ANION GAP SERPL CALC-SCNC: 13 MMOL/L (ref 8–16)
AST SERPL-CCNC: 19 U/L (ref 10–40)
BASOPHILS # BLD AUTO: 0.03 K/UL (ref 0–0.2)
BASOPHILS NFR BLD: 0.3 % (ref 0–1.9)
BILIRUB SERPL-MCNC: 0.3 MG/DL (ref 0.1–1)
BLD GP AB SCN CELLS X3 SERPL QL: NORMAL
BUN SERPL-MCNC: 26 MG/DL (ref 8–23)
CALCIUM SERPL-MCNC: 9.1 MG/DL (ref 8.7–10.5)
CHLORIDE SERPL-SCNC: 105 MMOL/L (ref 95–110)
CO2 SERPL-SCNC: 18 MMOL/L (ref 23–29)
CREAT SERPL-MCNC: 1.2 MG/DL (ref 0.5–1.4)
DIFFERENTIAL METHOD BLD: ABNORMAL
EOSINOPHIL # BLD AUTO: 0.1 K/UL (ref 0–0.5)
EOSINOPHIL NFR BLD: 1 % (ref 0–8)
ERYTHROCYTE [DISTWIDTH] IN BLOOD BY AUTOMATED COUNT: 20.6 % (ref 11.5–14.5)
EST. GFR  (NO RACE VARIABLE): >60 ML/MIN/1.73 M^2
GLUCOSE SERPL-MCNC: 117 MG/DL (ref 70–110)
HCT VFR BLD AUTO: 41.6 % (ref 40–54)
HGB BLD-MCNC: 14.4 G/DL (ref 14–18)
IMM GRANULOCYTES # BLD AUTO: 0.04 K/UL (ref 0–0.04)
IMM GRANULOCYTES NFR BLD AUTO: 0.5 % (ref 0–0.5)
INR PPP: 1 (ref 0.8–1.2)
LYMPHOCYTES # BLD AUTO: 0.9 K/UL (ref 1–4.8)
LYMPHOCYTES NFR BLD: 10.5 % (ref 18–48)
MCH RBC QN AUTO: 29.9 PG (ref 27–31)
MCHC RBC AUTO-ENTMCNC: 34.6 G/DL (ref 32–36)
MCV RBC AUTO: 87 FL (ref 82–98)
MONOCYTES # BLD AUTO: 0.6 K/UL (ref 0.3–1)
MONOCYTES NFR BLD: 7.4 % (ref 4–15)
NEUTROPHILS # BLD AUTO: 6.9 K/UL (ref 1.8–7.7)
NEUTROPHILS NFR BLD: 80.3 % (ref 38–73)
NRBC BLD-RTO: 0 /100 WBC
PLATELET # BLD AUTO: 179 K/UL (ref 150–450)
PMV BLD AUTO: 10.1 FL (ref 9.2–12.9)
POTASSIUM SERPL-SCNC: 4 MMOL/L (ref 3.5–5.1)
PROT SERPL-MCNC: 6.1 G/DL (ref 6–8.4)
PROTHROMBIN TIME: 11.7 SEC (ref 9–12.5)
RBC # BLD AUTO: 4.81 M/UL (ref 4.6–6.2)
SODIUM SERPL-SCNC: 136 MMOL/L (ref 136–145)
SPECIMEN OUTDATE: NORMAL
TROPONIN I SERPL DL<=0.01 NG/ML-MCNC: 0.01 NG/ML (ref 0–0.03)
WBC # BLD AUTO: 8.64 K/UL (ref 3.9–12.7)

## 2025-02-13 PROCEDURE — 93005 ELECTROCARDIOGRAM TRACING: CPT

## 2025-02-13 PROCEDURE — 85610 PROTHROMBIN TIME: CPT | Performed by: NURSE PRACTITIONER

## 2025-02-13 PROCEDURE — 84484 ASSAY OF TROPONIN QUANT: CPT | Performed by: NURSE PRACTITIONER

## 2025-02-13 PROCEDURE — 80053 COMPREHEN METABOLIC PANEL: CPT | Performed by: NURSE PRACTITIONER

## 2025-02-13 PROCEDURE — 86850 RBC ANTIBODY SCREEN: CPT | Performed by: NURSE PRACTITIONER

## 2025-02-13 PROCEDURE — 85025 COMPLETE CBC W/AUTO DIFF WBC: CPT | Performed by: NURSE PRACTITIONER

## 2025-02-13 PROCEDURE — 99284 EMERGENCY DEPT VISIT MOD MDM: CPT | Mod: 25

## 2025-02-13 PROCEDURE — 93010 ELECTROCARDIOGRAM REPORT: CPT | Mod: ,,, | Performed by: INTERNAL MEDICINE

## 2025-02-14 NOTE — FIRST PROVIDER EVALUATION
Emergency Department TeleTriage Encounter Note      CHIEF COMPLAINT    Chief Complaint   Patient presents with    Multiple Complaints     loss of apatite x several weeks.  HTN  Pt appears to have dried coffee ground emesis in and around mouth, denies vomiting.          VITAL SIGNS   Initial Vitals [02/13/25 1650]   BP Pulse Resp Temp SpO2   (!) 150/81 88 20 97.4 °F (36.3 °C) 96 %      MAP       --            ALLERGIES    Review of patient's allergies indicates:  No Known Allergies    PROVIDER TRIAGE NOTE  This is a teletriage evaluation of a 69 y.o. male presenting to the ED complaining of decreased appetite for a couple weeks. Denies pain.  States he is fatigued. Denies vomiting but appears to have dried coffee ground emesis around mouth. Denies abd pain and rectal bleeding. Pmhx  GIB.    Alert, sitting upright.     Initial orders will be placed and care will be transferred to an alternate provider when patient is roomed for a full evaluation. Any additional orders and the final disposition will be determined by that provider.         ORDERS  Labs Reviewed - No data to display    ED Orders (720h ago, onward)      Start Ordered     Status Ordering Provider    02/13/25 2000 02/13/25 1928  Vital Signs  Every 2 hours         Ordered SHAYNE CASTLE N.    02/13/25 1929 02/13/25 1928  Troponin I  STAT         Ordered SHAYNE CASTLE N.    02/13/25 1928 02/13/25 1928  CBC auto differential  STAT         Ordered SHAYNE CASTLE N.    02/13/25 1928 02/13/25 1928  Comprehensive metabolic panel  STAT         Ordered SHAYNE CASTLE N.    02/13/25 1928 02/13/25 1928  Insert Saline lock IV  Once         Ordered SHAYNE CASTLE N.    02/13/25 1928 02/13/25 1928  EKG 12-lead  Once         Ordered SHAYNE CASTLE N.    02/13/25 1928 02/13/25 1928  Cardiac Monitoring - Adult  Continuous        Comments: Notify Physician If:    Ordered SHAYNE CASTLE N.    02/13/25 1928 02/13/25  1928  Pulse Oximetry Continuous  Continuous         Ordered SHAYNE CASTLE N.    02/13/25 1928 02/13/25 1928  Protime-INR  STAT         Ordered SHAYNE CASTLE              Virtual Visit Note: The provider triage portion of this emergency department evaluation and documentation was performed via [employer], a HIPAA-compliant telemedicine application, in concert with a tele-presenter in the room. A face to face patient evaluation with one of my colleagues will occur once the patient is placed in an emergency department room.      DISCLAIMER: This note was prepared with [employer] voice recognition transcription software. Garbled syntax, mangled pronouns, and other bizarre constructions may be attributed to that software system.

## 2025-02-14 NOTE — DISCHARGE INSTRUCTIONS
We know that you have many choices and are honored that you chose us. We hope that we met or exceeded your expectations and goals for this visit and will keep the [facility] in mind for your future needs and those of your family and friends.     - Dr. Leavitt

## 2025-02-14 NOTE — ED NOTES
"Pt presents to ED c/o decreased food consumption over the last 3 weeks. Pt states, "Its hard to get through a meal. I just don't want to eat. Pt denies nausea and vomiting.     "

## 2025-02-14 NOTE — ED PROVIDER NOTES
Emergency Department Provider Note    Nacho Gusman   69 y.o. male   [id number]      2/13/2025       History     This history was obtained from the patient without limitations.  He drove himself to the ED.      He is a 69-year-old with the below past medical history.  He also has bipolar disorder for which he takes Seroquel.  He complains of poor appetite for several weeks.  He complains of worsening depression over this period of time as well.  He feels that his depression is due to erectile dysfunction which he believes is caused by Seroquel use.  He last saw his mental health provider, Dr. Ram (??), three weeks ago and brought up this matter but does not feel that it was properly addressed.  He denies all pain.  He denies weakness and dizziness.  He denies shortness of breath.  He denies nausea, vomiting, and diarrhea.  He denies hallucinations, suicidal ideations, and homicidal ideations.         Past Medical History:   Diagnosis Date    Depression     [facility]    Gastric ulcer 01/01/1997    surgery in 1997    Hypertension     Occult GI bleeding 02/13/2015    Pleural effusion 05/17/2023    Seizure disorder       Past Surgical History:   Procedure Laterality Date    COLONOSCOPY N/A 5/17/2023    Procedure: COLONOSCOPY;  Surgeon: Alberto Alfonso MD;  Location: [facility];  Service: Endoscopy;  Laterality: N/A;    COLONOSCOPY N/A 2/6/2024    Procedure: COLONOSCOPY;  Surgeon: Alberto Alfonso MD;  Location: [facility];  Service: Endoscopy;  Laterality: N/A;    ESOPHAGOGASTRODUODENOSCOPY N/A 5/17/2023    Procedure: EGD (ESOPHAGOGASTRODUODENOSCOPY);  Surgeon: Alberto Alfonso MD;  Location: [facility];  Service: Endoscopy;  Laterality: N/A;    stomach surgery  1997    for perforated gastric ulcer      Family History   Problem Relation Name Age of Onset    Hypertension Father      Hypertension Brother        Social History     Socioeconomic History    Marital status: Single   Tobacco Use    Smoking  status: Former     Current packs/day: 0.00     Average packs/day: 1.5 packs/day for 20.0 years (30.0 ttl pk-yrs)     Types: Cigarettes     Start date: 3/2/1977     Quit date: 3/2/1997     Years since quittin.9     Passive exposure: Past   Substance and Sexual Activity    Alcohol use: No    Drug use: No    Sexual activity: Not Currently     Social Drivers of Health     Financial Resource Strain: Low Risk  (2023)    Overall Financial Resource Strain (CARDIA)     Difficulty of Paying Living Expenses: Not hard at all   Food Insecurity: No Food Insecurity (2023)    Hunger Vital Sign     Worried About Running Out of Food in the Last Year: Never true     Ran Out of Food in the Last Year: Never true   Transportation Needs: No Transportation Needs (2023)    PRAPARE - Transportation     Lack of Transportation (Medical): No     Lack of Transportation (Non-Medical): No   Stress: No Stress Concern Present (2023)    [employer] [place] [employer] - Occupational Stress Questionnaire     Feeling of Stress : Not at all   Housing Stability: Low Risk  (2023)    Housing Stability Vital Sign     Unable to Pay for Housing in the Last Year: No     Number of Places Lived in the Last Year: 1     Unstable Housing in the Last Year: No      Review of patient's allergies indicates:  No Known Allergies        Physical Examination     Initial Vitals [25 1650]   BP Pulse Resp Temp SpO2   (!) 150/81 88 20 97.4 °F (36.3 °C) 96 %      MAP       --           Physical Exam    Nursing note and vitals reviewed.  Constitutional: He is not diaphoretic. No distress.   HENT: Mouth/Throat: Oropharynx is clear and moist.   Eyes: Conjunctivae are normal. No scleral icterus.   Cardiovascular:  Normal rate, regular rhythm and normal heart sounds.     Exam reveals no gallop and no friction rub.       No murmur heard.  Pulmonary/Chest: No respiratory distress. He has no wheezes. He has no rhonchi.   Abdominal: Abdomen  is soft. He exhibits no distension. There is no abdominal tenderness.   Musculoskeletal:         General: No edema.     Neurological: He is alert and oriented to person, place, and time. GCS score is 15. GCS eye subscore is 4. GCS verbal subscore is 5. GCS motor subscore is 6.   Skin: Skin is warm and dry. No pallor.   Psychiatric: His speech is normal. He is withdrawn. He is not actively hallucinating. Thought content is not paranoid and not delusional. He exhibits a depressed mood. He expresses no homicidal and no suicidal ideation. He is attentive.            Labs     Labs Reviewed   CBC W/ AUTO DIFFERENTIAL - Abnormal       Result Value    WBC 8.64      RBC 4.81      Hemoglobin 14.4      Hematocrit 41.6      MCV 87      MCH 29.9      MCHC 34.6      RDW 20.6 (*)     Platelets 179      MPV 10.1      Immature Granulocytes 0.5      Gran # (ANC) 6.9      Immature Grans (Abs) 0.04      Lymph # 0.9 (*)     Mono # 0.6      Eos # 0.1      Baso # 0.03      nRBC 0      Gran % 80.3 (*)     Lymph % 10.5 (*)     Mono % 7.4      Eosinophil % 1.0      Basophil % 0.3      Differential Method Automated     COMPREHENSIVE METABOLIC PANEL - Abnormal    Sodium 136      Potassium 4.0      Chloride 105      CO2 18 (*)     Glucose 117 (*)     BUN 26 (*)     Creatinine 1.2      Calcium 9.1      Total Protein 6.1      Albumin 3.6      Total Bilirubin 0.3      Alkaline Phosphatase 95      AST 19      ALT 22      eGFR >60      Anion Gap 13     PROTIME-INR    Prothrombin Time 11.7      INR 1.0     TROPONIN I    Troponin I 0.010     TYPE & SCREEN    Group & Rh O POS      Indirect Boni NEG      Specimen Outdate 02/16/2025 23:59          Imaging     Imaging Results    None           ED Course     The patient received the following medications:  Medications - No data to display    Procedures    ED Course as of 02/13/25 2254   u Feb 13, 2025   2252 EKG 12-lead  Time of study: 02/13/2025 19:59  Independently interpreted by me.    Normal sinus  rhythm. Ventricular rate 68 bpm.  Normal axis.  Normal QRS duration and QT interval.  No ST segment elevation or depression.  Normal T-wave morphology. Overall impression:  Normal EKG. [LP]      ED Course User Index  [LP] Rafiq Leavitt III, MD        Medical Decision Making                 Medical Decision Making  The patient is well-appearing and in no distress.  Workup consisted of EKG and labs.  EKG was normal.  There were no concerning basic lab abnormalities.  The patient's appetite and increased depression would best be managed by his mental health provider.  He is not homicidal, suicidal, or gravely disabled.  I instructed him to arrange close follow-up with his mental health provider in his primary care provider.    Amount and/or Complexity of Data Reviewed  ECG/medicine tests:  Decision-making details documented in ED Course.              Diagnoses       ICD-10-CM ICD-9-CM   1. Decreased appetite  R63.0 783.0   2. Depression, unspecified depression type  F32.A 311         Dispostion      ED Disposition Condition    Discharge Stable            ED Prescriptions    None         Follow-up Information       Follow up With Specialties Details Why Contact Info    Your mental health provider   Schedule a close ER follow-up visit.     Esmer Licona MD Family Medicine  Schedule a close in-person or virtual ER follow-up visit with your primary care provider. [address]  [facility] [address]  [phone]                Rafiq Leavitt III, MD  02/13/25 0226

## 2025-02-15 LAB
OHS QRS DURATION: 70 MS
OHS QTC CALCULATION: 408 MS

## 2025-02-17 ENCOUNTER — TELEPHONE (OUTPATIENT)
Dept: HEMATOLOGY/ONCOLOGY | Facility: CLINIC | Age: 70
End: 2025-02-17
Payer: MEDICARE

## 2025-02-17 NOTE — TELEPHONE ENCOUNTER
Pt stated he is concerned about him losing 30 lbs over the past few months because he does not have an appetite that he thinks is related to depression but would like to gain weight. Informed pt that I will let Dr Avelar know.

## 2025-02-17 NOTE — TELEPHONE ENCOUNTER
----- Message from Terri sent at 2/17/2025  4:04 PM CST -----  Type:  Needs Medical AdviceWho Called: ptSymptoms (please be specific): Symptom: Weight LossOutcome: Schedule an appointment to be seen within 24 hours.Reason: Caller denied all higher acuity questions How long has patient had these symptoms:  ongoing for awhileWould the patient rather a call back or a response via [employer]? [facility] Call Back Number: [phone] Information:

## 2025-02-18 ENCOUNTER — TELEPHONE (OUTPATIENT)
Dept: HEMATOLOGY/ONCOLOGY | Facility: CLINIC | Age: 70
End: 2025-02-18
Payer: MEDICARE

## 2025-02-18 ENCOUNTER — TELEPHONE (OUTPATIENT)
Dept: PSYCHIATRY | Facility: CLINIC | Age: 70
End: 2025-02-18
Payer: MEDICARE

## 2025-02-18 DIAGNOSIS — R63.0 DECREASED APPETITE: Primary | ICD-10-CM

## 2025-02-18 RX ORDER — CYPROHEPTADINE HYDROCHLORIDE 4 MG/1
4 TABLET ORAL
Qty: 60 TABLET | Refills: 3 | Status: SHIPPED | OUTPATIENT
Start: 2025-02-18

## 2025-02-18 NOTE — TELEPHONE ENCOUNTER
I called and spoke to him. He states he is taking seroquel, but that he is still having depression. He denies thoughts of self-harm. I will send in an appetite stimulant for decreased appetite. I will also reach out to Dr. Louise.     Eddie Avelar M.D.  Hematology/Oncology  [facility] [address], [address]  [place] [address]  Phone: [phone]  Fax: [phone]

## 2025-02-19 ENCOUNTER — DOCUMENTATION ONLY (OUTPATIENT)
Dept: PSYCHIATRY | Facility: CLINIC | Age: 70
End: 2025-02-19
Payer: MEDICARE

## 2025-02-19 NOTE — PROGRESS NOTES
Scheduling staff spoke to patient's brother. He states patient was admitted to [facility] yesterday. They will recontact our clinic upon his discharge to schedule appt.  GHULAM Louise, PhD

## 2025-03-06 ENCOUNTER — OFFICE VISIT (OUTPATIENT)
Dept: FAMILY MEDICINE | Facility: CLINIC | Age: 70
End: 2025-03-06
Payer: MEDICARE

## 2025-03-06 ENCOUNTER — OFFICE VISIT (OUTPATIENT)
Dept: FAMILY MEDICINE | Facility: HOSPITAL | Age: 70
End: 2025-03-06
Payer: MEDICARE

## 2025-03-06 VITALS
SYSTOLIC BLOOD PRESSURE: 104 MMHG | HEIGHT: 70 IN | WEIGHT: 150.13 LBS | OXYGEN SATURATION: 99 % | TEMPERATURE: 98 F | DIASTOLIC BLOOD PRESSURE: 74 MMHG | HEART RATE: 82 BPM | BODY MASS INDEX: 21.49 KG/M2

## 2025-03-06 VITALS
DIASTOLIC BLOOD PRESSURE: 75 MMHG | SYSTOLIC BLOOD PRESSURE: 111 MMHG | HEIGHT: 70 IN | BODY MASS INDEX: 21.55 KG/M2 | WEIGHT: 150.56 LBS | HEART RATE: 80 BPM

## 2025-03-06 DIAGNOSIS — I10 PRIMARY HYPERTENSION: ICD-10-CM

## 2025-03-06 DIAGNOSIS — R63.0 DECREASED APPETITE: Primary | ICD-10-CM

## 2025-03-06 DIAGNOSIS — F32.A DEPRESSION, UNSPECIFIED DEPRESSION TYPE: Chronic | ICD-10-CM

## 2025-03-06 DIAGNOSIS — Z87.891 HISTORY OF SMOKING 30 OR MORE PACK YEARS: ICD-10-CM

## 2025-03-06 DIAGNOSIS — R63.4 UNINTENTIONAL WEIGHT LOSS: Primary | ICD-10-CM

## 2025-03-06 DIAGNOSIS — F31.9 BIPOLAR AFFECTIVE DISORDER, REMISSION STATUS UNSPECIFIED: Chronic | ICD-10-CM

## 2025-03-06 DIAGNOSIS — F33.1 MODERATE EPISODE OF RECURRENT MAJOR DEPRESSIVE DISORDER: ICD-10-CM

## 2025-03-06 PROCEDURE — 3288F FALL RISK ASSESSMENT DOCD: CPT | Mod: CPTII,S$GLB,, | Performed by: FAMILY MEDICINE

## 2025-03-06 PROCEDURE — 3008F BODY MASS INDEX DOCD: CPT | Mod: CPTII,S$GLB,, | Performed by: FAMILY MEDICINE

## 2025-03-06 PROCEDURE — 3074F SYST BP LT 130 MM HG: CPT | Mod: CPTII,S$GLB,, | Performed by: FAMILY MEDICINE

## 2025-03-06 PROCEDURE — 1159F MED LIST DOCD IN RCRD: CPT | Mod: CPTII,S$GLB,, | Performed by: FAMILY MEDICINE

## 2025-03-06 PROCEDURE — 1101F PT FALLS ASSESS-DOCD LE1/YR: CPT | Mod: CPTII,S$GLB,, | Performed by: FAMILY MEDICINE

## 2025-03-06 PROCEDURE — 99999 PR PBB SHADOW E&M-EST. PATIENT-LVL IV: CPT | Mod: PBBFAC,,, | Performed by: FAMILY MEDICINE

## 2025-03-06 PROCEDURE — 1126F AMNT PAIN NOTED NONE PRSNT: CPT | Mod: CPTII,S$GLB,, | Performed by: FAMILY MEDICINE

## 2025-03-06 PROCEDURE — 3078F DIAST BP <80 MM HG: CPT | Mod: CPTII,S$GLB,, | Performed by: FAMILY MEDICINE

## 2025-03-06 PROCEDURE — 99214 OFFICE O/P EST MOD 30 MIN: CPT | Mod: S$GLB,,, | Performed by: FAMILY MEDICINE

## 2025-03-06 PROCEDURE — 99213 OFFICE O/P EST LOW 20 MIN: CPT | Performed by: PHYSICIAN ASSISTANT

## 2025-03-06 RX ORDER — MIRTAZAPINE 7.5 MG/1
7.5 TABLET, FILM COATED ORAL NIGHTLY
Qty: 30 TABLET | Refills: 2 | Status: SHIPPED | OUTPATIENT
Start: 2025-03-06 | End: 2026-03-06

## 2025-03-06 RX ORDER — QUETIAPINE FUMARATE 100 MG/1
100 TABLET, FILM COATED ORAL NIGHTLY
COMMUNITY
Start: 2025-02-28

## 2025-03-06 NOTE — PROGRESS NOTES
FAMILY MEDICINE  New Visit Progress Note   Recent Hospital Discharge     PRESENTING HISTORY     Chief Complaint/Reason for Admission:  Follow up Hospital Discharge   Chief Complaint   Patient presents with    Follow-up     HFU     PCP: Suri Russell MD    History of Present Illness:  Nacho Gusman is a 69 y.o. male with PMH of major depression, generalized anxiety, bipolar disorder, hypertension, seizure disorder. He is here today for ED follow up. Recently evaluated in the ED with complaint of decreased appetite and anorexia. Reports a 20lb weight loss over the past few months. Denies any nausea, vomiting, abdominal pain, diarrhea. Just doesn't feel like eating.     He is followed by heme/onc, Dr. Avelar, and cyproheptadine was called in, but he never picked it up. Today he saw Dr. Dalton, Family Medicine, and Mirtazapine was called in as well. The patient thinks that his mood may be at least partially responsible for his decreased appetite. He is not sure what he is taking medication wise other than Seroquel 100mg nightly.     CRCS 2/2024, prep fair, but examined colon normal. Suggested repeat 5 years  LDCT: Smoked for 20 years. Quit around 15 years ago  PSA neg 11/2025    Review of Systems  General ROS: +weight loss  Psychological ROS: +depression, denies SI/HI/AVH  Ophthalmic ROS: negative for blurry vision, photophobia or eye pain  ENT ROS: negative for epistaxis, sore throat or rhinorrhea  Respiratory ROS: no cough, shortness of breath, or wheezing  Cardiovascular ROS: no chest pain or dyspnea on exertion  Gastrointestinal ROS: no abdominal pain, change in bowel habits, or black/ bloody stools  Genito-Urinary ROS: no dysuria, trouble voiding, or hematuria  Musculoskeletal ROS: negative for gait disturbance or muscular weakness  Neurological ROS: no syncope or seizures; no ataxia  Dermatological ROS: negative for pruritis, rash and jaundice    PAST HISTORY:     Past Medical History:   Diagnosis Date     Depression     [facility]    Gastric ulcer 1997    surgery in     Hypertension     Occult GI bleeding 2015    Pleural effusion 2023    Seizure disorder        Past Surgical History:   Procedure Laterality Date    COLONOSCOPY N/A 2023    Procedure: COLONOSCOPY;  Surgeon: Alberto Alfonso MD;  Location: [facility] ENDO;  Service: Endoscopy;  Laterality: N/A;    COLONOSCOPY N/A 2024    Procedure: COLONOSCOPY;  Surgeon: Alberto Alfonso MD;  Location: [facility] ENDO;  Service: Endoscopy;  Laterality: N/A;    ESOPHAGOGASTRODUODENOSCOPY N/A 2023    Procedure: EGD (ESOPHAGOGASTRODUODENOSCOPY);  Surgeon: Alberto Alfonso MD;  Location: [facility] ENDO;  Service: Endoscopy;  Laterality: N/A;    stomach surgery      for perforated gastric ulcer       Family History   Problem Relation Name Age of Onset    Hypertension Father      Hypertension Brother         Social History     Socioeconomic History    Marital status: Single   Tobacco Use    Smoking status: Former     Current packs/day: 0.00     Average packs/day: 1.5 packs/day for 20.0 years (30.0 ttl pk-yrs)     Types: Cigarettes     Start date: 3/2/1977     Quit date: 3/2/1997     Years since quittin.0     Passive exposure: Past   Substance and Sexual Activity    Alcohol use: No    Drug use: No    Sexual activity: Not Currently     Social Drivers of Health     Financial Resource Strain: Low Risk  (2023)    Overall Financial Resource Strain (CARDIA)     Difficulty of Paying Living Expenses: Not hard at all   Food Insecurity: No Food Insecurity (2023)    Hunger Vital Sign     Worried About Running Out of Food in the Last Year: Never true     Ran Out of Food in the Last Year: Never true   Transportation Needs: No Transportation Needs (2023)    PRAPARE - Transportation     Lack of Transportation (Medical): No     Lack of Transportation (Non-Medical): No   Stress: No Stress Concern Present (2023)    [employer]  "Occupational Health - Occupational Stress Questionnaire     Feeling of Stress : Not at all   Housing Stability: Low Risk  (5/19/2023)    Housing Stability Vital Sign     Unable to Pay for Housing in the Last Year: No     Number of Places Lived in the Last Year: 1     Unstable Housing in the Last Year: No       MEDICATIONS & ALLERGIES:     Medications Ordered Prior to Encounter[1]     Review of patient's allergies indicates:  No Known Allergies    OBJECTIVE:     Vital Signs:  Vitals:    03/06/25 0952 03/06/25 1022   BP: (!) 82/66 111/75   BP Location: Left arm    Patient Position: Sitting    Pulse: 80    Weight: 68.3 kg (150 lb 9.2 oz)    Height: 5' 10" (1.778 m)        Wt Readings from Last 3 Encounters:   03/06/25 0952 68.3 kg (150 lb 9.2 oz)   03/06/25 0902 68.1 kg (150 lb 2.1 oz)   02/13/25 1650 68 kg (150 lb)     Body mass index is 21.61 kg/m².        Physical Exam:  /75   Pulse 80   Ht 5' 10" (1.778 m)   Wt 68.3 kg (150 lb 9.2 oz)   BMI 21.61 kg/m²   General appearance: Alert, cooperative, no distress  Constitutional: Oriented to person, place, and time. +thin, a bit disheveled   HEENT: Normocephalic, atraumatic, neck symmetrical, no nasal discharge   Eyes: Conjunctivae/corneas clear, EOM's intact  Lungs: Clear to auscultation bilaterally  Heart: Regular rate and rhythm without rub  Abdomen: Soft, non-tender; bowel sounds normoactive  Extremities: extremities symmetric; no edema  Integument: Skin color, texture, turgor normal  Neurologic: Alert and oriented X 3, normal strength, normal coordination and gait  Psychiatric: no pressured speech; normal affect; no evidence of impaired cognition     Laboratory  Lab Results   Component Value Date    WBC 8.64 02/13/2025    HGB 14.4 02/13/2025    HCT 41.6 02/13/2025    MCV 87 02/13/2025     02/13/2025     BMP  Lab Results   Component Value Date     02/13/2025    K 4.0 02/13/2025     02/13/2025    CO2 18 (L) 02/13/2025    BUN 26 (H) " "02/13/2025    CREATININE 1.2 02/13/2025    CALCIUM 9.1 02/13/2025    ANIONGAP 13 02/13/2025    EGFRNORACEVR >60 02/13/2025     Lab Results   Component Value Date    ALT 22 02/13/2025    AST 19 02/13/2025    ALKPHOS 95 02/13/2025    BILITOT 0.3 02/13/2025     Lab Results   Component Value Date    INR 1.0 02/13/2025     Lab Results   Component Value Date    HGBA1C 5.8 (H) 08/06/2024     No results for input(s): "POCTGLUCOSE" in the last 72 hours.    ASSESSMENT & PLAN:     Unintentional weight loss   -UTD on CRCS, prostate cancer screen. Will get LDCT d/t smoking history>20 years. Seems likely related to uncontrolled depression. Advised to bring all psych medications with him to follow up. Will start nightly mirtazapine and follow up here with Dr. Russell (will be new PCP) in a few weeks to discuss improvement.     History of smoking 30 or more pack years  -     CT Chest Lung Screening Low Dose; Future; Expected date: 03/06/2025    Bipolar affective disorder, remission status unspecified  Depression, unspecified depression type   -Bring medications to follow up for verification. Advised to discuss with his psych provider as well.     Primary hypertension   -No medication at this time. BP a bit soft on presentation. Asymptomatic. Continue to monitor.     Scheduled Follow-up :  Future Appointments   Date Time Provider Department Center   3/31/2025  1:40 PM Suri Russell MD [facility]   5/14/2025  1:00 PM LAB, SPECIMEN [facility]   5/15/2025  2:40 PM Eddie Avelar MD [facility] ON[facility]       Post Visit Medication List:     Medication List            Accurate as of March 6, 2025 11:27 AM. If you have any questions, ask your nurse or doctor.                START taking these medications      mirtazapine 7.5 MG Tab  Commonly known as: REMERON  Take 1 tablet (7.5 mg total) by mouth every evening.  Started by: Marisol Dalton            CONTINUE taking these medications      ascorbic acid " (vitamin C) 500 MG tablet  Commonly known as: VITAMIN C     co-enzyme Q-10 30 mg capsule     ferrous sulfate 325 mg (65 mg iron) Tab tablet  Commonly known as: IRON  Take 1 tablet (325 mg total) by mouth daily with breakfast.     folic acid 400 MCG tablet  Commonly known as: FOLVITE     magnesium oxide 400 mg (241.3 mg magnesium) tablet  Commonly known as: MAG-OX     omega 3-dha-epa-fish oil 1,200 (144-216) mg Cap     pantoprazole 40 MG tablet  Commonly known as: PROTONIX  Take 1 tablet (40 mg total) by mouth once daily.     QUEtiapine 100 MG Tab  Commonly known as: SEROQUEL     sildenafiL 50 MG tablet  Commonly known as: VIAGRA  Take 1 tablet (50 mg total) by mouth daily as needed for Erectile Dysfunction.     vitamin E 400 UNIT capsule            STOP taking these medications      cyproheptadine 4 mg tablet  Commonly known as: PERIACTIN  Stopped by: Marisol Dalton               Where to Get Your Medications        These medications were sent to [facility]  [place] [address]  [address] [address]      Phone: [phone]   mirtazapine 7.5 MG Tab         Signing Provider:  Kishore Ospina PA-C           [1]   Current Outpatient Medications on File Prior to Visit   Medication Sig Dispense Refill    ascorbic acid, vitamin C, (VITAMIN C) 500 MG tablet Take 500 mg by mouth once daily.      co-enzyme Q-10 30 mg capsule Take 30 mg by mouth once daily.      ferrous sulfate (IRON) 325 mg (65 mg iron) Tab tablet Take 1 tablet (325 mg total) by mouth daily with breakfast. 90 tablet 3    folic acid (FOLVITE) 400 MCG tablet Take 400 mcg by mouth once daily.      magnesium oxide (MAG-OX) 400 mg (241.3 mg magnesium) tablet Take 400 mg by mouth once daily.      omega 3-dha-epa-fish oil 1,200 (144-216) mg Cap Take 1,200 mg by mouth 2 (two) times daily.      pantoprazole (PROTONIX) 40 MG tablet Take 1 tablet (40 mg total) by mouth once daily. 60 tablet 3    sildenafiL (VIAGRA) 50 MG tablet Take 1  tablet (50 mg total) by mouth daily as needed for Erectile Dysfunction. 30 tablet 1    vitamin E 400 UNIT capsule Take 400 Units by mouth once daily.      [DISCONTINUED] cyproheptadine (PERIACTIN) 4 mg tablet Take 1 tablet (4 mg total) by mouth 2 (two) times daily before meals. 60 tablet 3     Current Facility-Administered Medications on File Prior to Visit   Medication Dose Route Frequency Provider Last Rate Last Admin    0.9%  NaCl infusion   Intravenous Continuous Alberto Alfonso MD 20 mL/hr at 02/06/24 1011 New Bag at 02/06/24 1011    sodium chloride 0.9% flush 10 mL  10 mL Intravenous PRN Alberto Alfonso MD

## 2025-03-06 NOTE — PROGRESS NOTES
(Portions of this note were dictated using voice recognition software and may contain dictation related errors in spelling/grammar/syntax not found on text review)    CC:   Chief Complaint   Patient presents with    Anorexia     Loss of apettite       HPI: 69 y.o. male, pt of Dr Licona, presented today to discuss anorexia.  He has medical history significant major depression, generalized anxiety, bipolar disorder, hypertension, seizure disorder.    Pt has concerns about decreased appetite and anorexia, he states he is eating once a day typically in the morning and has to force himself for the rest of the meals. He reports losing 20 lbs weight, denies any other symptoms.     He was seen in ED with same concerns in feb and was advised to follow up with psychiatry.  Then he contacted hematology and cyproheptadine was called in, however, pt never picked it up, states he does not remember anything why he did not pick it up.    He reports struggling with depression and anxiety, he follows with psychiatrist nd takes Seroquel, pt states medication does not help and his symptoms are not controlled.    No other concerns.    Past Medical History:   Diagnosis Date    Depression     [facility]    Gastric ulcer 01/01/1997    surgery in 1997    Hypertension     Occult GI bleeding 02/13/2015    Pleural effusion 05/17/2023    Seizure disorder        Past Surgical History:   Procedure Laterality Date    COLONOSCOPY N/A 5/17/2023    Procedure: COLONOSCOPY;  Surgeon: Alberto Alfonso MD;  Location: [facility];  Service: Endoscopy;  Laterality: N/A;    COLONOSCOPY N/A 2/6/2024    Procedure: COLONOSCOPY;  Surgeon: Alberto Alfonso MD;  Location: [facility];  Service: Endoscopy;  Laterality: N/A;    ESOPHAGOGASTRODUODENOSCOPY N/A 5/17/2023    Procedure: EGD (ESOPHAGOGASTRODUODENOSCOPY);  Surgeon: Alberto Alfonso MD;  Location: [facility];  Service: Endoscopy;  Laterality: N/A;    stomach surgery  1997    for perforated gastric  ulcer       Family History   Problem Relation Name Age of Onset    Hypertension Father      Hypertension Brother         Social History[1]    Lab Results   Component Value Date    WBC 8.64 02/13/2025    HGB 14.4 02/13/2025    HCT 41.6 02/13/2025    MCV 87 02/13/2025     02/13/2025    CHOL 156 08/06/2024    TRIG 39 08/06/2024    HDL 66 08/06/2024    ALT 22 02/13/2025    AST 19 02/13/2025    BILITOT 0.3 02/13/2025    ALKPHOS 95 02/13/2025     02/13/2025    K 4.0 02/13/2025     02/13/2025    CREATININE 1.2 02/13/2025    ESTGFRAFRICA >60 08/02/2018    EGFRNONAA 59 (A) 08/02/2018    CALCIUM 9.1 02/13/2025    ALBUMIN 3.6 02/13/2025    BUN 26 (H) 02/13/2025    CO2 18 (L) 02/13/2025    TSH 1.691 08/06/2024    PSA 0.55 01/11/2025    INR 1.0 02/13/2025    HGBA1C 5.8 (H) 08/06/2024    LDLCALC 82.2 08/06/2024     (H) 02/13/2025    [id number] 42 08/02/2018             Vital signs reviewed  PE:   APPEARANCE: In no acute distress.    HEAD: Normocephalic, atraumatic.  EYES: EOMI.  Conjunctivae noninjected.  NOSE: Mucosa pink. Airway clear.  NECK: Supple with no cervical lymphadenopathy.    CHEST: Lungs clear to auscultation with no wheezes or crackles.  CARDIOVASCULAR: Normal S1, S2. No rubs, murmurs, or gallops.  ABDOMEN: Bowel sounds normal. Not distended. Soft. No tenderness or masses. No organomegaly.  EXTREMITIES: No edema, cyanosis, or clubbing.    Review of Systems   Constitutional:  Positive for activity change, appetite change and unexpected weight change. Negative for chills, fatigue, fever and night sweats.   HENT: Negative.     Respiratory:  Negative for cough, shortness of breath and wheezing.    Cardiovascular:  Negative for chest pain, palpitations and leg swelling.   Gastrointestinal: Negative.    Genitourinary: Negative.    Musculoskeletal: Negative.    Neurological: Negative.    Psychiatric/Behavioral:  Positive for agitation, decreased concentration, depressed mood and dysphoric mood.  Negative for behavioral problems, confusion, hallucinations, self-injury, sleep disturbance and suicidal ideas. The patient is not nervous/anxious and is not hyperactive.    All other systems reviewed and are negative.      IMPRESSION  1. Decreased appetite    2. Moderate episode of recurrent major depressive disorder            PLAN      1. Decreased appetite (Primary)    - mirtazapine (REMERON) 7.5 MG Tab; Take 1 tablet (7.5 mg total) by mouth every evening.  Dispense: 30 tablet; Refill: 2    Seems like his symptoms are due to un controlled depression, started Remeron to help with depression and appetite      2. Moderate episode of recurrent major depressive disorder    - mirtazapine (REMERON) 7.5 MG Tab; Take 1 tablet (7.5 mg total) by mouth every evening.  Dispense: 30 tablet; Refill: 2     Advised to follow up with psych and PCP        SCREENINGS        Age/demographic appropriate health maintenance:    Health Maintenance Due   Topic Date Due    Shingles Vaccine (2 of 2) 10/07/2024         Return ED/UC precautions given      Spent adequate time in obtaining history and explaining differentials     35 minutes spent during this visit of which greater than 50% devoted to face-face counseling and coordination of care regarding diagnosis and management plan       Marisol Dalton   3/6/2025         [1]   Social History  Tobacco Use    Smoking status: Former     Current packs/day: 0.00     Average packs/day: 1.5 packs/day for 20.0 years (30.0 ttl pk-yrs)     Types: Cigarettes     Start date: 3/2/1977     Quit date: 3/2/1997     Years since quittin.0     Passive exposure: Past   Substance Use Topics    Alcohol use: No    Drug use: No

## 2025-03-31 ENCOUNTER — OFFICE VISIT (OUTPATIENT)
Dept: FAMILY MEDICINE | Facility: HOSPITAL | Age: 70
End: 2025-03-31
Payer: MEDICARE

## 2025-03-31 VITALS
OXYGEN SATURATION: 99 % | WEIGHT: 150.81 LBS | HEART RATE: 85 BPM | SYSTOLIC BLOOD PRESSURE: 118 MMHG | DIASTOLIC BLOOD PRESSURE: 83 MMHG | HEIGHT: 70 IN | BODY MASS INDEX: 21.59 KG/M2

## 2025-03-31 DIAGNOSIS — R73.03 PRE-DIABETES: ICD-10-CM

## 2025-03-31 DIAGNOSIS — R63.4 UNINTENTIONAL WEIGHT LOSS: Primary | ICD-10-CM

## 2025-03-31 PROCEDURE — 99214 OFFICE O/P EST MOD 30 MIN: CPT

## 2025-03-31 RX ORDER — BUPROPION HYDROCHLORIDE 100 MG/1
100 TABLET, EXTENDED RELEASE ORAL 2 TIMES DAILY
COMMUNITY

## 2025-03-31 RX ORDER — VENLAFAXINE 100 MG/1
150 TABLET ORAL DAILY
COMMUNITY

## 2025-03-31 NOTE — PROGRESS NOTES
[facility]    Subjective:     Nacho Gusman is a 69 y.o. year old male with PMHx of major depression, generalized anxiety, bipolar disorder, hypertension, seizure disorder who presents to clinic for follow up.    Weight loss and appetite loss: Seen by Dr. Dalton and Kishore and started on Mirtazapine nightly 7.5 mg. However, he feels like the medication is not helping. States stopped smoking 28 years ago but smoked for 20 years prior to that. CT chest abdomen and pelvic ordered last visit due to concern for malignancy. He reports has appetite loss for many months now due to his depression, no longer enjoys food. Not currently working out but used to in the past. Eats healthy choice meals at [facility] ~300 calories per meal. He does not really snack at all. He recently bought creatine powder and protein powder to help him gain more weight. Denies any dysphagia, diarrhea, constipation, chest pain, SOB, wheezing, fever/chills or night sweats.     Psych: Saw Dr. Viera recently 1 week ago but states did not mention the weight changes to him. Now on Seroquel, bupropion, venlafaxine, and Ativan PRN (which he states has not been taking anymore). Buproprion is a recent medication and was started in January.     Patient Active Problem List    Diagnosis Date Noted    Iron deficiency anemia due to chronic blood loss 09/18/2024    History of peptic ulcer disease 05/17/2023    Hypoalbuminemia 05/17/2023    Bipolar disorder 01/30/2014    Microcytic anemia 01/21/2014    Hypertension 01/02/2014    Depression 01/02/2014    Erectile dysfunction 01/02/2014        Review of patient's allergies indicates:  No Known Allergies     Past Medical History:   Diagnosis Date    Depression     [facility]    Gastric ulcer 01/01/1997    surgery in 1997    Hypertension     Occult GI bleeding 02/13/2015    Pleural effusion 05/17/2023    Seizure disorder       Past Surgical History:   Procedure Laterality Date    COLONOSCOPY N/A  "2023    Procedure: COLONOSCOPY;  Surgeon: Alberto Alfonso MD;  Location: [facility] END[facility];  Service: Endoscopy;  Laterality: N/A;    COLONOSCOPY N/A 2024    Procedure: COLONOSCOPY;  Surgeon: Alberto Alfonso MD;  Location: [facility] ENDO;  Service: Endoscopy;  Laterality: N/A;    ESOPHAGOGASTRODUODENOSCOPY N/A 2023    Procedure: EGD (ESOPHAGOGASTRODUODENOSCOPY);  Surgeon: Alberto Alfonso MD;  Location: [facility] ENDO;  Service: Endoscopy;  Laterality: N/A;    stomach surgery      for perforated gastric ulcer      Family History   Problem Relation Name Age of Onset    Hypertension Father      Hypertension Brother        Social History     Tobacco Use    Smoking status: Former     Current packs/day: 0.00     Average packs/day: 1.5 packs/day for 20.0 years (30.0 ttl pk-yrs)     Types: Cigarettes     Start date: 3/2/1977     Quit date: 3/2/1997     Years since quittin.0     Passive exposure: Past    Smokeless tobacco: Not on file   Substance Use Topics    Alcohol use: No        Objective:     Vitals:    25 1353 25 1437   BP: (!) 156/98 118/83   BP Location:  Left arm   Pulse: 85    SpO2: 99%    Weight: 68.4 kg (150 lb 12.7 oz)    Height: 5' 10" (1.778 m)      Body mass index is 21.64 kg/m².    Physical Exam  Vitals reviewed.   Constitutional:       General: He is not in acute distress.     Appearance: He is not ill-appearing or toxic-appearing.   Eyes:      Extraocular Movements: Extraocular movements intact.   Cardiovascular:      Rate and Rhythm: Normal rate and regular rhythm.   Pulmonary:      Effort: Pulmonary effort is normal. No respiratory distress.      Breath sounds: Normal breath sounds. No wheezing, rhonchi or rales.   Musculoskeletal:      Right lower leg: No edema.      Left lower leg: No edema.   Skin:     General: Skin is warm.   Neurological:      Mental Status: He is alert. Mental status is at baseline.   Psychiatric:         Mood and Affect: Mood is anxious. "         Assessment/Plan:     Nacho Gusman is a 69 y.o. year old male who presents to clinic for follow up on weight loss.    1. Unintentional weight loss (Primary)  - Etiology worked out previously by another provider, all has been unrevealing thus far. Discussed with patient he is otherwise up-to-date on his cancer screenings. Prior colonoscopy as well as PSA and other labs for this have been unrevealing. Still has not gotten the CT chest abdomen and pelvis ordered by previous provider due to patient's concern for malignancy. I suspect that patient's cause is likely due to poor appetite 2/2 uncontrolled mood disorder and inadequate caloric intake based on food diary. Patient has noticed that his appetite has decreased over months.   - Recommended close follow up with his Psychiatrist, likely will need adjustments made to better control his mood disorder and inquire about stopping the bupropion  - Spent a significant time counseling patient on tracking calories via [employer] or another milla and keeping a food diary as he needs at least 2,000 calories per day to maintain his weight. He will need to eat an additional 300-500 calories to gain weight as he wants to get back to his weight of 160lbs.   - Handout given to patient on foods that will help minimize weight loss. OK to continue protein powder but discussed it is better for him to get it through whole foods and dietary changes  - Will rule out other causes such as thyroid disease with TSH. Re-check A1c as below  - Will discontinue mirtazapine at this visit as it has not shown any benefit based on studies (Choosing Wisely campaign and AGS recommendations)  - May benefit from a nutritionist if CT imaging is unremarkable to help him gain weight back    2. Pre-diabetes  - Last A1c 5.8, diet controlled  - Repeat Hemoglobin A1C; Future    Follow-up: 1 month for weight check    Case discussed with staff: SANDRA Russell MD  [facility],  PGY-3  03/31/2025

## 2025-04-01 ENCOUNTER — RESULTS FOLLOW-UP (OUTPATIENT)
Dept: HEPATOLOGY | Facility: HOSPITAL | Age: 70
End: 2025-04-01

## 2025-04-04 ENCOUNTER — HOSPITAL ENCOUNTER (OUTPATIENT)
Dept: RADIOLOGY | Facility: HOSPITAL | Age: 70
Discharge: HOME | End: 2025-04-04
Attending: PHYSICIAN ASSISTANT
Payer: MEDICARE

## 2025-04-04 DIAGNOSIS — R63.4 UNINTENTIONAL WEIGHT LOSS: ICD-10-CM

## 2025-04-04 PROCEDURE — 71250 CT THORAX DX C-: CPT | Mod: 26,,, | Performed by: RADIOLOGY

## 2025-04-04 PROCEDURE — 71250 CT THORAX DX C-: CPT | Mod: TC

## 2025-04-04 PROCEDURE — 74176 CT ABD & PELVIS W/O CONTRAST: CPT | Mod: 26,,, | Performed by: RADIOLOGY

## 2025-04-29 ENCOUNTER — OFFICE VISIT (OUTPATIENT)
Dept: FAMILY MEDICINE | Facility: HOSPITAL | Age: 70
End: 2025-04-29
Payer: MEDICARE

## 2025-04-29 VITALS
OXYGEN SATURATION: 98 % | SYSTOLIC BLOOD PRESSURE: 128 MMHG | HEART RATE: 84 BPM | BODY MASS INDEX: 22.54 KG/M2 | WEIGHT: 157.44 LBS | DIASTOLIC BLOOD PRESSURE: 80 MMHG | HEIGHT: 70 IN | RESPIRATION RATE: 18 BRPM

## 2025-04-29 DIAGNOSIS — R63.4 UNINTENTIONAL WEIGHT LOSS: ICD-10-CM

## 2025-04-29 DIAGNOSIS — R73.03 PRE-DIABETES: Primary | ICD-10-CM

## 2025-04-29 PROCEDURE — 99214 OFFICE O/P EST MOD 30 MIN: CPT

## 2025-04-29 NOTE — PROGRESS NOTES
[facility]    Subjective:     Nacho Gusman is a 69 y.o. year old male with PMHx of major depression, generalized anxiety, bipolar disorder, hypertension, seizure disorder who presents to clinic for follow up.    Weight loss: Has been eating more protein in his diet. Has gained 7lb since last visit 1 month ago, but reports he did not notice. Had CT chest abdomen and pelvis ordered by another provider which showed no clear etiology for weight loss. Extensive colonic bowel gas and stool. Prior gastric bypass. Patient states he was worried about having cancer because he did not receive a call about his imaging results. He states that if he did have cancer he is someone who would not go through chemotherapy or any treatment.    Patient Active Problem List    Diagnosis Date Noted    Iron deficiency anemia due to chronic blood loss 09/18/2024    History of peptic ulcer disease 05/17/2023    Hypoalbuminemia 05/17/2023    Bipolar disorder 01/30/2014    Microcytic anemia 01/21/2014    Hypertension 01/02/2014    Depression 01/02/2014    Erectile dysfunction 01/02/2014        Review of patient's allergies indicates:  No Known Allergies     Past Medical History:   Diagnosis Date    Depression     [facility]    Gastric ulcer 01/01/1997    surgery in 1997    Hypertension     Occult GI bleeding 02/13/2015    Pleural effusion 05/17/2023    Seizure disorder       Past Surgical History:   Procedure Laterality Date    COLONOSCOPY N/A 5/17/2023    Procedure: COLONOSCOPY;  Surgeon: Alberto Alfonso MD;  Location: [facility];  Service: Endoscopy;  Laterality: N/A;    COLONOSCOPY N/A 2/6/2024    Procedure: COLONOSCOPY;  Surgeon: Alberto Alfonso MD;  Location: [facility];  Service: Endoscopy;  Laterality: N/A;    ESOPHAGOGASTRODUODENOSCOPY N/A 5/17/2023    Procedure: EGD (ESOPHAGOGASTRODUODENOSCOPY);  Surgeon: Alberto Alfonso MD;  Location: [facility];  Service: Endoscopy;  Laterality: N/A;    stomach surgery  1997     "for perforated gastric ulcer      Family History   Problem Relation Name Age of Onset    Hypertension Father      Hypertension Brother        Social History     Tobacco Use    Smoking status: Former     Current packs/day: 0.00     Average packs/day: 1.5 packs/day for 20.0 years (30.0 ttl pk-yrs)     Types: Cigarettes     Start date: 3/2/1977     Quit date: 3/2/1997     Years since quittin.1     Passive exposure: Past    Smokeless tobacco: Not on file   Substance Use Topics    Alcohol use: No        Objective:     Vitals:    25 1510   BP: 128/80   BP Location: Right arm   Patient Position: Sitting   Pulse: 84   Resp: 18   SpO2: 98%   Weight: 71.4 kg (157 lb 6.5 oz)   Height: 5' 10" (1.778 m)     Body mass index is 22.59 kg/m².    Gen: No apparent distress, well nourished and developed, appears stated age  CV: RRR, S1 and S2 present, no LE edema  Resp: CTAB, normal respiratory effort    Assessment/Plan:     Nacho Gusman is a 69 y.o. year old male who presents to clinic for follow up.    1. Unintentional weight loss  - Improving since last visit  - Gaining weight back with increased caloric intake. Advised patient to continue to keep up the good work as he is a healthy weight. Continue with protein intake to help rebuild his muscle mass.  - Discussed with patient based on his imaging results and prior cancer screenings, there is no concern at this time for cancer    2. Pre-diabetes (Primary)  - Discussed with patient A1c still remains <7  - No indication to start treatment at this time especially given recent weight loss. Continue with dietary changes as discussed.  - Counseled patient on the importance maintaining a healthy diet (including at least one-half of food eaten should be whole fruits and vegetables with the core elements of the other half of food  should include grains, whole grains, dairy, protein, and oils with lower saturated fat, as well as minimizing alcohol use and consumption of foods with " added sugar, saturated fat, and sodium.)     Follow-up: 3 months    Case discussed with staff: TEDDY Russell MD  [facility], PGY-3  04/30/2025

## 2025-05-09 NOTE — PROGRESS NOTES
PATIENT: Nacho Gusman  MRN: [id number]  DATE: 5/15/2025    Diagnosis:   1. Iron deficiency    2. History of partial gastrectomy    3. Erectile dysfunction, unspecified erectile dysfunction type      Chief Complaint: iron deficiency anemia    Oncologic History:      Oncologic History     Oncologic Treatment     Pathology       Subjective:    History of Present Illness: Mr. Gusman is a 69 y.o. male who presented in September 2024 for evaluation and management of iron deficiency anemia. He was referred by Dr. Russell.    - labs since 2015 reveal a mild-to-severe microcytic anemia  - iron studies (8/6/24) revealed a decreased ferritin/iron with elevated total iron binding capacity.  - history of partial gastrectomy in 1997.    - he is taking oral iron  - he received ferric gluconate x 4 doses in September/October 2024.      Interval history:  - he presents for a follow-up appointment for his iron deficiency.  - today, he is doing well. He denies shortness of breath, chest pain, nausea, vomiting, diarrhea, constipation.         Past medical, surgical, family, and social histories have been reviewed and updated below.    Past Medical History:   Past Medical History:   Diagnosis Date    Depression     [facility]    Gastric ulcer 01/01/1997    surgery in 1997    Hypertension     Occult GI bleeding 02/13/2015    Pleural effusion 05/17/2023    Seizure disorder        Past Surgical History:   Past Surgical History:   Procedure Laterality Date    COLONOSCOPY N/A 5/17/2023    Procedure: COLONOSCOPY;  Surgeon: Alberto Alfonso MD;  Location: [facility];  Service: Endoscopy;  Laterality: N/A;    COLONOSCOPY N/A 2/6/2024    Procedure: COLONOSCOPY;  Surgeon: Alberto Alfonso MD;  Location: [facility];  Service: Endoscopy;  Laterality: N/A;    ESOPHAGOGASTRODUODENOSCOPY N/A 5/17/2023    Procedure: EGD (ESOPHAGOGASTRODUODENOSCOPY);  Surgeon: Alberto Alfonso MD;  Location: [facility];  Service: Endoscopy;  Laterality: N/A;     stomach surgery  1997    for perforated gastric ulcer       Family History:   Family History   Problem Relation Name Age of Onset    Hypertension Father      Hypertension Brother         Social History:  reports that he quit smoking about 28 years ago. His smoking use included cigarettes. He started smoking about 48 years ago. He has a 30 pack-year smoking history. He has been exposed to tobacco smoke. He does not have any smokeless tobacco history on file. He reports that he does not drink alcohol and does not use drugs.    Allergies:  Review of patient's allergies indicates:  No Known Allergies    Medications:  Current Outpatient Medications   Medication Sig Dispense Refill    ascorbic acid, vitamin C, (VITAMIN C) 500 MG tablet Take 500 mg by mouth once daily.      buPROPion (WELLBUTRIN SR) 100 MG TBSR 12 hr tablet Take 100 mg by mouth 2 (two) times daily.      co-enzyme Q-10 30 mg capsule Take 30 mg by mouth once daily.      ferrous sulfate (IRON) 325 mg (65 mg iron) Tab tablet Take 1 tablet (325 mg total) by mouth daily with breakfast. 90 tablet 3    folic acid (FOLVITE) 400 MCG tablet Take 400 mcg by mouth once daily.      magnesium oxide (MAG-OX) 400 mg (241.3 mg magnesium) tablet Take 400 mg by mouth once daily.      omega 3-dha-epa-fish oil 1,200 (144-216) mg Cap Take 1,200 mg by mouth 2 (two) times daily.      pantoprazole (PROTONIX) 40 MG tablet Take 1 tablet (40 mg total) by mouth once daily. 60 tablet 3    QUEtiapine (SEROQUEL) 100 MG Tab Take 100 mg by mouth every evening.      sildenafiL (VIAGRA) 50 MG tablet Take 1 tablet (50 mg total) by mouth daily as needed for Erectile Dysfunction. 30 tablet 1    venlafaxine (EFFEXOR) 100 MG Tab Take 150 mg by mouth once daily.      vitamin E 400 UNIT capsule Take 400 Units by mouth once daily.       No current facility-administered medications for this visit.     Facility-Administered Medications Ordered in Other Visits   Medication Dose Route Frequency Provider  Last Rate Last Admin    0.9%  NaCl infusion   Intravenous Continuous Alberto Alfonso MD 20 mL/hr at 02/06/24 1011 New Bag at 02/06/24 1011    sodium chloride 0.9% flush 10 mL  10 mL Intravenous PRN Alberto Alfonso MD           Review of Systems   Constitutional:  Negative for fatigue.   HENT:  Negative for sore throat.    Eyes:  Negative for visual disturbance.   Respiratory:  Negative for shortness of breath.    Cardiovascular:  Negative for chest pain.   Gastrointestinal:  Negative for abdominal pain.   Genitourinary:  Negative for dysuria.   Musculoskeletal:  Negative for back pain.   Skin:  Negative for rash.   Neurological:  Negative for headaches.   Hematological:  Negative for adenopathy.   Psychiatric/Behavioral:  The patient is not nervous/anxious.        ECOG Performance Status:   ECOG SCORE    0 - Fully active-able to carry on all pre-disease performance without restriction         Objective:      Vitals:   Vitals:    05/15/25 1438   BP: 94/68   BP Location: Left arm   Patient Position: Sitting   Pulse: 66   Resp: 18   Temp: 97.9 °F (36.6 °C)   TempSrc: Oral   SpO2: 95%   Weight: 72.4 kg (159 lb 9.8 oz)         BMI: Body mass index is 22.9 kg/m².    Physical Exam  Vitals and nursing note reviewed.   Constitutional:       Appearance: He is well-developed.   HENT:      Head: Normocephalic and atraumatic.   Eyes:      Pupils: Pupils are equal, round, and reactive to light.   Cardiovascular:      Rate and Rhythm: Normal rate and regular rhythm.   Pulmonary:      Effort: Pulmonary effort is normal.      Breath sounds: Normal breath sounds.   Abdominal:      General: Bowel sounds are normal.      Palpations: Abdomen is soft.   Musculoskeletal:         General: Normal range of motion.      Cervical back: Normal range of motion and neck supple.   Skin:     General: Skin is warm and dry.   Neurological:      Mental Status: He is alert and oriented to person, place, and time.   Psychiatric:         Behavior:  Behavior normal.         Thought Content: Thought content normal.         Judgment: Judgment normal.         Laboratory Data:  Labs have been reviewed.    Lab Results   Component Value Date    WBC 7.71 05/14/2025    HGB 14.7 05/14/2025    HCT 45.3 05/14/2025    MCV 96 05/14/2025     05/14/2025     Lab Results   Component Value Date    FERRITIN 16.6 (L) 05/14/2025     Lab Results   Component Value Date    IRON 120 05/14/2025    TRANSFERRIN 269 05/14/2025    TIBC 398 05/14/2025    LABIRON 30 05/14/2025    FESATURATED 18 (L) 01/14/2025            Diagnostics:   Colonoscopy (2/6/24): normal    Upper GI endoscopy (5/17/23):  - Normal esophagus.                          - A Billroth I anastomosis was found,                          characterized by friable mucosa and erythema.                          - Normal examined jejunum. Biopsied.                          Billroth 1 with erythema and friable gastric                          mucosa but without bleeding, but some oozing on                          contact. The rest of the exam normal     Imaging:    Assessment:       1. Iron deficiency    2. History of partial gastrectomy    3. Erectile dysfunction, unspecified erectile dysfunction type           Plan:     Iron deficiency / history of partial gastrectomy  - I have reviewed his chart  - labs since 2015 reveal a mild-to-severe microcytic anemia  - iron studies (8/6/24) revealed a decreased ferritin/iron with elevated total iron binding capacity.  - he had partial gastrectomy in 1997, so perhaps his iron deficiency is partially acquired secondary to poor absorption.  - colonoscopy (2/6/24) was normal.  - Upper GI endoscopy (5/17/23): Billroth 1 with erythema and friable gastric   mucosa but without bleeding, but some oozing on contact.   - he received ferric gluconate x 4 doses in September/October 2024.  - Labs have been reviewed. Hemoglobin is 14.7 g/dL. Ferritin decreased to 16.6 ng/mL.  - continue oral  iron.  - will arrange for IV iron  - return to clinic in 4 months with repeat labs.    2. Erectile dysfunction  - I had sent in viagra previously, but he has not tried it yet  - he has seen urology  - continue to monitor      - return to clinic in 4 months with repeat labs.    Eddie Avelar M.D.  Hematology/Oncology  [facility] - [address], [address]  [place] [address]  Phone: [phone]  Fax: [phone]

## 2025-05-14 ENCOUNTER — LAB VISIT (OUTPATIENT)
Dept: LAB | Facility: HOSPITAL | Age: 70
End: 2025-05-14
Attending: INTERNAL MEDICINE
Payer: MEDICARE

## 2025-05-14 DIAGNOSIS — D50.0 IRON DEFICIENCY ANEMIA DUE TO CHRONIC BLOOD LOSS: ICD-10-CM

## 2025-05-14 LAB
ABSOLUTE EOSINOPHIL (OHS): 0.16 K/UL
ABSOLUTE MONOCYTE (OHS): 0.89 K/UL (ref 0.3–1)
ABSOLUTE NEUTROPHIL COUNT (OHS): 5.59 K/UL (ref 1.8–7.7)
BASOPHILS # BLD AUTO: 0.04 K/UL
BASOPHILS NFR BLD AUTO: 0.5 %
ERYTHROCYTE [DISTWIDTH] IN BLOOD BY AUTOMATED COUNT: 13.5 % (ref 11.5–14.5)
FERRITIN SERPL-MCNC: 16.6 NG/ML (ref 20–300)
HCT VFR BLD AUTO: 45.3 % (ref 40–54)
HGB BLD-MCNC: 14.7 GM/DL (ref 14–18)
IMM GRANULOCYTES # BLD AUTO: 0.03 K/UL (ref 0–0.04)
IMM GRANULOCYTES NFR BLD AUTO: 0.4 % (ref 0–0.5)
IRON SATN MFR SERPL: 30 % (ref 20–50)
IRON SERPL-MCNC: 120 UG/DL (ref 45–160)
LYMPHOCYTES # BLD AUTO: 1 K/UL (ref 1–4.8)
MCH RBC QN AUTO: 31.3 PG (ref 27–31)
MCHC RBC AUTO-ENTMCNC: 32.5 G/DL (ref 32–36)
MCV RBC AUTO: 96 FL (ref 82–98)
NUCLEATED RBC (/100WBC) (OHS): 0 /100 WBC
PLATELET # BLD AUTO: 197 K/UL (ref 150–450)
PMV BLD AUTO: 10.1 FL (ref 9.2–12.9)
RBC # BLD AUTO: 4.7 M/UL (ref 4.6–6.2)
RELATIVE EOSINOPHIL (OHS): 2.1 %
RELATIVE LYMPHOCYTE (OHS): 13 % (ref 18–48)
RELATIVE MONOCYTE (OHS): 11.5 % (ref 4–15)
RELATIVE NEUTROPHIL (OHS): 72.5 % (ref 38–73)
TIBC SERPL-MCNC: 398 UG/DL (ref 250–450)
TRANSFERRIN SERPL-MCNC: 269 MG/DL (ref 200–375)
WBC # BLD AUTO: 7.71 K/UL (ref 3.9–12.7)

## 2025-05-14 PROCEDURE — 83540 ASSAY OF IRON: CPT

## 2025-05-14 PROCEDURE — 85025 COMPLETE CBC W/AUTO DIFF WBC: CPT

## 2025-05-14 PROCEDURE — 36415 COLL VENOUS BLD VENIPUNCTURE: CPT

## 2025-05-14 PROCEDURE — 82728 ASSAY OF FERRITIN: CPT

## 2025-05-15 ENCOUNTER — OFFICE VISIT (OUTPATIENT)
Dept: HEMATOLOGY/ONCOLOGY | Facility: CLINIC | Age: 70
End: 2025-05-15
Payer: MEDICARE

## 2025-05-15 VITALS
BODY MASS INDEX: 22.9 KG/M2 | TEMPERATURE: 98 F | SYSTOLIC BLOOD PRESSURE: 94 MMHG | DIASTOLIC BLOOD PRESSURE: 68 MMHG | WEIGHT: 159.63 LBS | OXYGEN SATURATION: 95 % | HEART RATE: 66 BPM | RESPIRATION RATE: 18 BRPM

## 2025-05-15 DIAGNOSIS — Z90.3 HISTORY OF PARTIAL GASTRECTOMY: ICD-10-CM

## 2025-05-15 DIAGNOSIS — E61.1 IRON DEFICIENCY: Primary | ICD-10-CM

## 2025-05-15 DIAGNOSIS — N52.9 ERECTILE DYSFUNCTION, UNSPECIFIED ERECTILE DYSFUNCTION TYPE: ICD-10-CM

## 2025-05-15 PROCEDURE — 1101F PT FALLS ASSESS-DOCD LE1/YR: CPT | Mod: CPTII,S$GLB,, | Performed by: INTERNAL MEDICINE

## 2025-05-15 PROCEDURE — 3044F HG A1C LEVEL LT 7.0%: CPT | Mod: CPTII,S$GLB,, | Performed by: INTERNAL MEDICINE

## 2025-05-15 PROCEDURE — 3008F BODY MASS INDEX DOCD: CPT | Mod: CPTII,S$GLB,, | Performed by: INTERNAL MEDICINE

## 2025-05-15 PROCEDURE — 3074F SYST BP LT 130 MM HG: CPT | Mod: CPTII,S$GLB,, | Performed by: INTERNAL MEDICINE

## 2025-05-15 PROCEDURE — 1126F AMNT PAIN NOTED NONE PRSNT: CPT | Mod: CPTII,S$GLB,, | Performed by: INTERNAL MEDICINE

## 2025-05-15 PROCEDURE — 1160F RVW MEDS BY RX/DR IN RCRD: CPT | Mod: CPTII,S$GLB,, | Performed by: INTERNAL MEDICINE

## 2025-05-15 PROCEDURE — 99999 PR PBB SHADOW E&M-EST. PATIENT-LVL III: CPT | Mod: PBBFAC,,, | Performed by: INTERNAL MEDICINE

## 2025-05-15 PROCEDURE — 1159F MED LIST DOCD IN RCRD: CPT | Mod: CPTII,S$GLB,, | Performed by: INTERNAL MEDICINE

## 2025-05-15 PROCEDURE — 99214 OFFICE O/P EST MOD 30 MIN: CPT | Mod: S$GLB,,, | Performed by: INTERNAL MEDICINE

## 2025-05-15 PROCEDURE — 3288F FALL RISK ASSESSMENT DOCD: CPT | Mod: CPTII,S$GLB,, | Performed by: INTERNAL MEDICINE

## 2025-05-15 PROCEDURE — 3078F DIAST BP <80 MM HG: CPT | Mod: CPTII,S$GLB,, | Performed by: INTERNAL MEDICINE

## 2025-05-15 RX ORDER — SODIUM CHLORIDE 0.9 % (FLUSH) 0.9 %
10 SYRINGE (ML) INJECTION
OUTPATIENT
Start: 2025-05-21

## 2025-05-15 RX ORDER — SODIUM FERRIC GLUCONATE COMPLEX IN SUCROSE 12.5 MG/ML
125 INJECTION INTRAVENOUS
OUTPATIENT
Start: 2025-05-21

## 2025-05-15 RX ORDER — EPINEPHRINE 0.3 MG/.3ML
0.3 INJECTION SUBCUTANEOUS ONCE AS NEEDED
OUTPATIENT
Start: 2025-05-21

## 2025-05-15 RX ORDER — HEPARIN 100 UNIT/ML
500 SYRINGE INTRAVENOUS
OUTPATIENT
Start: 2025-05-21

## 2025-05-15 NOTE — Clinical Note
Good afternoon,  You had seen him in January for erectile dysfunction. While it's most likely related to his antidepressant medication, he was asking about checking for nerve issues/blood vessel issues in the penis. I told him I'm not sure what the appropriate test is for that, but I would reach out to you. Can you reach out to him and discuss workup?  Thanks!

## 2025-05-21 ENCOUNTER — TELEPHONE (OUTPATIENT)
Dept: INFUSION THERAPY | Facility: HOSPITAL | Age: 70
End: 2025-05-21
Payer: MEDICARE

## 2025-05-21 NOTE — TELEPHONE ENCOUNTER
(1st attempt) called the patient to schedule iron infusions. Pt declined to schedule at this time due to car needing repair. Pt requested I call back in 1 week.

## 2025-05-28 ENCOUNTER — TELEPHONE (OUTPATIENT)
Dept: INFUSION THERAPY | Facility: HOSPITAL | Age: 70
End: 2025-05-28
Payer: MEDICARE

## 2025-05-30 ENCOUNTER — OFFICE VISIT (OUTPATIENT)
Dept: UROLOGY | Facility: CLINIC | Age: 70
End: 2025-05-30
Payer: MEDICARE

## 2025-05-30 VITALS
DIASTOLIC BLOOD PRESSURE: 79 MMHG | BODY MASS INDEX: 22.65 KG/M2 | WEIGHT: 158.19 LBS | HEART RATE: 60 BPM | HEIGHT: 70 IN | SYSTOLIC BLOOD PRESSURE: 128 MMHG

## 2025-05-30 DIAGNOSIS — N52.01 ERECTILE DYSFUNCTION DUE TO ARTERIAL INSUFFICIENCY: Primary | ICD-10-CM

## 2025-05-30 PROCEDURE — 99999 PR PBB SHADOW E&M-EST. PATIENT-LVL IV: CPT | Mod: PBBFAC,,,

## 2025-05-30 NOTE — PROGRESS NOTES
Subjective:       Patient ID: Nacho Gusman is a 70 y.o. male.    Chief Complaint: ED     This is a 70 y.o.  male patient that is new to me.  The patient was self referred  for Erectile dysfunction. Patient here with complaints of erectile dysfunction.  He endorses difficulty getting and maintaining erections.  He is not able to reliably achieve a strong enough erection for intercourse. Reports that he has had this problem for sometime and does not understand why he is having this issue. Reports a history of high blood pressure as well as 30 year smoking history and he quit about 20 years ago.  Denies use of nitroglycerin.  Total testosterone normal-- 551  Previous ED treatment: no, was prescribed viagra 50mg but has not tried taking the medication yet.     5/30/25-- Reports viagra is not effective. He started going to [facility] for injections to help with ED, not sure if this is helping him yet. He is wanting test done to see why he is having trouble with erections.      LAST PSA  Lab Results   Component Value Date    PSA 0.55 01/11/2025       Lab Results   Component Value Date    CREATININE 1.2 02/13/2025       ---  PMH/PSH/Medications/Allergies/Social history reviewed and as in chart.    Review of Systems   Constitutional:  Negative for activity change, chills and fever.   Respiratory:  Negative for shortness of breath.    Cardiovascular:  Negative for chest pain and palpitations.   Gastrointestinal:  Negative for abdominal pain and constipation.   Genitourinary:  Negative for difficulty urinating, dysuria, flank pain, frequency, hematuria and urgency.   Neurological:  Negative for dizziness and light-headedness.       Objective:      Physical Exam  HENT:      Head: Normocephalic.   Pulmonary:      Effort: Pulmonary effort is normal.   Musculoskeletal:         General: Normal range of motion.      Cervical back: Normal range of motion.   Skin:     General: Skin is warm and dry.   Neurological:       Mental Status: He is alert and oriented to person, place, and time.         Assessment:     Problem Noted   Erectile Dysfunction 1/2/2014    Viagra 50mg         Plan:     Erectile Dysfunction  - We discussed the etiology and management of ED, including organic and psychogenic causes. We discussed his pre diabetes as well as antidepressants playing a role in his ED.  Penile US ordered- will message with results  Follow-up PRN for issues and concerns    ETTA Rodriguez    I spent a total of 30 minutes on the day of the visit.This includes face to face time and non-face to face time preparing to see the patient (eg, review of tests), obtaining and/or reviewing separately obtained history, documenting clinical information in the electronic or other health record, independently interpreting results and communicating results to the patient/family/caregiver, or care coordinator.

## 2025-07-01 ENCOUNTER — TELEPHONE (OUTPATIENT)
Dept: INFUSION THERAPY | Facility: HOSPITAL | Age: 70
End: 2025-07-01
Payer: MEDICARE

## 2025-07-08 ENCOUNTER — INFUSION (OUTPATIENT)
Dept: INFUSION THERAPY | Facility: HOSPITAL | Age: 70
End: 2025-07-08
Attending: INTERNAL MEDICINE
Payer: MEDICARE

## 2025-07-08 VITALS
HEART RATE: 77 BPM | SYSTOLIC BLOOD PRESSURE: 104 MMHG | DIASTOLIC BLOOD PRESSURE: 58 MMHG | RESPIRATION RATE: 16 BRPM | OXYGEN SATURATION: 97 % | TEMPERATURE: 98 F

## 2025-07-08 DIAGNOSIS — E61.1 IRON DEFICIENCY: Primary | ICD-10-CM

## 2025-07-08 PROCEDURE — A4216 STERILE WATER/SALINE, 10 ML: HCPCS | Performed by: INTERNAL MEDICINE

## 2025-07-08 PROCEDURE — 63600175 PHARM REV CODE 636 W HCPCS: Performed by: INTERNAL MEDICINE

## 2025-07-08 PROCEDURE — 25000003 PHARM REV CODE 250: Performed by: INTERNAL MEDICINE

## 2025-07-08 PROCEDURE — 96374 THER/PROPH/DIAG INJ IV PUSH: CPT

## 2025-07-08 RX ORDER — SODIUM CHLORIDE 0.9 % (FLUSH) 0.9 %
10 SYRINGE (ML) INJECTION
Status: DISCONTINUED | OUTPATIENT
Start: 2025-07-08 | End: 2025-07-08 | Stop reason: HOSPADM

## 2025-07-08 RX ORDER — EPINEPHRINE 0.3 MG/.3ML
0.3 INJECTION SUBCUTANEOUS ONCE AS NEEDED
OUTPATIENT
Start: 2025-07-15

## 2025-07-08 RX ORDER — HEPARIN 100 UNIT/ML
500 SYRINGE INTRAVENOUS
Status: DISCONTINUED | OUTPATIENT
Start: 2025-07-08 | End: 2025-07-08 | Stop reason: HOSPADM

## 2025-07-08 RX ORDER — SODIUM FERRIC GLUCONATE COMPLEX IN SUCROSE 12.5 MG/ML
125 INJECTION INTRAVENOUS
OUTPATIENT
Start: 2025-07-15

## 2025-07-08 RX ORDER — SODIUM CHLORIDE 0.9 % (FLUSH) 0.9 %
10 SYRINGE (ML) INJECTION
OUTPATIENT
Start: 2025-07-15

## 2025-07-08 RX ORDER — SODIUM FERRIC GLUCONATE COMPLEX IN SUCROSE 12.5 MG/ML
125 INJECTION INTRAVENOUS
Status: COMPLETED | OUTPATIENT
Start: 2025-07-08 | End: 2025-07-08

## 2025-07-08 RX ORDER — HEPARIN 100 UNIT/ML
500 SYRINGE INTRAVENOUS
OUTPATIENT
Start: 2025-07-15

## 2025-07-08 RX ADMIN — Medication 10 ML: at 01:07

## 2025-07-08 RX ADMIN — SODIUM FERRIC GLUCONATE COMPLEX IN SUCROSE 125 MG: 12.5 INJECTION INTRAVENOUS at 01:07

## 2025-07-08 RX ADMIN — SODIUM CHLORIDE: 9 INJECTION, SOLUTION INTRAVENOUS at 01:07

## 2025-07-08 RX ADMIN — Medication 10 ML: at 02:07

## 2025-07-08 NOTE — NURSING
Ferrlecit 1/4 given, tolerated well. VSS. Schedule of future appointments printed and given to patient upon discharge.

## 2025-07-15 ENCOUNTER — INFUSION (OUTPATIENT)
Dept: INFUSION THERAPY | Facility: HOSPITAL | Age: 70
End: 2025-07-15
Attending: INTERNAL MEDICINE
Payer: MEDICARE

## 2025-07-15 VITALS
OXYGEN SATURATION: 96 % | TEMPERATURE: 98 F | SYSTOLIC BLOOD PRESSURE: 108 MMHG | RESPIRATION RATE: 16 BRPM | DIASTOLIC BLOOD PRESSURE: 59 MMHG | HEART RATE: 69 BPM

## 2025-07-15 DIAGNOSIS — E61.1 IRON DEFICIENCY: Primary | ICD-10-CM

## 2025-07-15 PROCEDURE — 63600175 PHARM REV CODE 636 W HCPCS: Performed by: INTERNAL MEDICINE

## 2025-07-15 PROCEDURE — 25000003 PHARM REV CODE 250: Performed by: INTERNAL MEDICINE

## 2025-07-15 PROCEDURE — A4216 STERILE WATER/SALINE, 10 ML: HCPCS | Performed by: INTERNAL MEDICINE

## 2025-07-15 PROCEDURE — 96374 THER/PROPH/DIAG INJ IV PUSH: CPT

## 2025-07-15 RX ORDER — SODIUM FERRIC GLUCONATE COMPLEX IN SUCROSE 12.5 MG/ML
125 INJECTION INTRAVENOUS
Status: COMPLETED | OUTPATIENT
Start: 2025-07-15 | End: 2025-07-15

## 2025-07-15 RX ORDER — HEPARIN 100 UNIT/ML
500 SYRINGE INTRAVENOUS
OUTPATIENT
Start: 2025-07-22

## 2025-07-15 RX ORDER — SODIUM FERRIC GLUCONATE COMPLEX IN SUCROSE 12.5 MG/ML
125 INJECTION INTRAVENOUS
OUTPATIENT
Start: 2025-07-22

## 2025-07-15 RX ORDER — EPINEPHRINE 0.3 MG/.3ML
0.3 INJECTION SUBCUTANEOUS ONCE AS NEEDED
OUTPATIENT
Start: 2025-07-22

## 2025-07-15 RX ORDER — EPINEPHRINE 0.3 MG/.3ML
0.3 INJECTION SUBCUTANEOUS ONCE AS NEEDED
Status: DISCONTINUED | OUTPATIENT
Start: 2025-07-15 | End: 2025-07-15 | Stop reason: HOSPADM

## 2025-07-15 RX ORDER — SODIUM CHLORIDE 0.9 % (FLUSH) 0.9 %
10 SYRINGE (ML) INJECTION
OUTPATIENT
Start: 2025-07-22

## 2025-07-15 RX ORDER — SODIUM CHLORIDE 0.9 % (FLUSH) 0.9 %
10 SYRINGE (ML) INJECTION
Status: DISCONTINUED | OUTPATIENT
Start: 2025-07-15 | End: 2025-07-15 | Stop reason: HOSPADM

## 2025-07-15 RX ADMIN — SODIUM FERRIC GLUCONATE COMPLEX IN SUCROSE 125 MG: 12.5 INJECTION INTRAVENOUS at 01:07

## 2025-07-15 RX ADMIN — Medication 10 ML: at 01:07

## 2025-07-15 NOTE — NURSING
Patient tolerated Ferrlecit #2 well today. PIV removed, catheter tip intact. Calendar given and reviewed with patient. NAD noted upon discharge. Discharged home, ambulated independently.

## 2025-07-22 ENCOUNTER — INFUSION (OUTPATIENT)
Dept: INFUSION THERAPY | Facility: HOSPITAL | Age: 70
End: 2025-07-22
Attending: INTERNAL MEDICINE
Payer: MEDICARE

## 2025-07-22 VITALS
SYSTOLIC BLOOD PRESSURE: 108 MMHG | RESPIRATION RATE: 17 BRPM | OXYGEN SATURATION: 97 % | TEMPERATURE: 98 F | HEART RATE: 63 BPM | DIASTOLIC BLOOD PRESSURE: 63 MMHG

## 2025-07-22 DIAGNOSIS — E61.1 IRON DEFICIENCY: Primary | ICD-10-CM

## 2025-07-22 PROCEDURE — 63600175 PHARM REV CODE 636 W HCPCS: Performed by: INTERNAL MEDICINE

## 2025-07-22 PROCEDURE — A4216 STERILE WATER/SALINE, 10 ML: HCPCS | Performed by: INTERNAL MEDICINE

## 2025-07-22 PROCEDURE — 25000003 PHARM REV CODE 250: Performed by: INTERNAL MEDICINE

## 2025-07-22 PROCEDURE — 96374 THER/PROPH/DIAG INJ IV PUSH: CPT

## 2025-07-22 RX ORDER — SODIUM CHLORIDE 0.9 % (FLUSH) 0.9 %
10 SYRINGE (ML) INJECTION
OUTPATIENT
Start: 2025-07-29

## 2025-07-22 RX ORDER — SODIUM FERRIC GLUCONATE COMPLEX IN SUCROSE 12.5 MG/ML
125 INJECTION INTRAVENOUS
Status: COMPLETED | OUTPATIENT
Start: 2025-07-22 | End: 2025-07-22

## 2025-07-22 RX ORDER — EPINEPHRINE 0.3 MG/.3ML
0.3 INJECTION SUBCUTANEOUS ONCE AS NEEDED
OUTPATIENT
Start: 2025-07-29

## 2025-07-22 RX ORDER — SODIUM CHLORIDE 0.9 % (FLUSH) 0.9 %
10 SYRINGE (ML) INJECTION
Status: DISCONTINUED | OUTPATIENT
Start: 2025-07-22 | End: 2025-07-22 | Stop reason: HOSPADM

## 2025-07-22 RX ORDER — HEPARIN 100 UNIT/ML
500 SYRINGE INTRAVENOUS
OUTPATIENT
Start: 2025-07-29

## 2025-07-22 RX ORDER — SODIUM FERRIC GLUCONATE COMPLEX IN SUCROSE 12.5 MG/ML
125 INJECTION INTRAVENOUS
OUTPATIENT
Start: 2025-07-29

## 2025-07-22 RX ADMIN — Medication 10 ML: at 01:07

## 2025-07-22 RX ADMIN — SODIUM FERRIC GLUCONATE COMPLEX IN SUCROSE 125 MG: 12.5 INJECTION INTRAVENOUS at 01:07

## 2025-07-22 NOTE — PLAN OF CARE
Patient tolerated Ferrlecit IVP well. No s/s adverse reaction. PIV removed, next appt reviewed, and patient ambulated out of clinic in NAD.

## 2025-07-29 ENCOUNTER — INFUSION (OUTPATIENT)
Dept: INFUSION THERAPY | Facility: HOSPITAL | Age: 70
End: 2025-07-29
Attending: INTERNAL MEDICINE
Payer: MEDICARE

## 2025-07-29 VITALS
RESPIRATION RATE: 18 BRPM | TEMPERATURE: 98 F | HEART RATE: 61 BPM | SYSTOLIC BLOOD PRESSURE: 99 MMHG | DIASTOLIC BLOOD PRESSURE: 60 MMHG | OXYGEN SATURATION: 97 %

## 2025-07-29 DIAGNOSIS — E61.1 IRON DEFICIENCY: Primary | ICD-10-CM

## 2025-07-29 PROCEDURE — A4216 STERILE WATER/SALINE, 10 ML: HCPCS | Performed by: INTERNAL MEDICINE

## 2025-07-29 PROCEDURE — 96374 THER/PROPH/DIAG INJ IV PUSH: CPT

## 2025-07-29 PROCEDURE — 25000003 PHARM REV CODE 250: Performed by: INTERNAL MEDICINE

## 2025-07-29 PROCEDURE — 63600175 PHARM REV CODE 636 W HCPCS: Performed by: INTERNAL MEDICINE

## 2025-07-29 RX ORDER — SODIUM FERRIC GLUCONATE COMPLEX IN SUCROSE 12.5 MG/ML
125 INJECTION INTRAVENOUS
Status: CANCELLED | OUTPATIENT
Start: 2025-07-29

## 2025-07-29 RX ORDER — SODIUM CHLORIDE 0.9 % (FLUSH) 0.9 %
10 SYRINGE (ML) INJECTION
Status: DISCONTINUED | OUTPATIENT
Start: 2025-07-29 | End: 2025-07-29 | Stop reason: HOSPADM

## 2025-07-29 RX ORDER — SODIUM FERRIC GLUCONATE COMPLEX IN SUCROSE 12.5 MG/ML
125 INJECTION INTRAVENOUS
Status: COMPLETED | OUTPATIENT
Start: 2025-07-29 | End: 2025-07-29

## 2025-07-29 RX ORDER — EPINEPHRINE 0.3 MG/.3ML
0.3 INJECTION SUBCUTANEOUS ONCE AS NEEDED
OUTPATIENT
Start: 2025-07-29

## 2025-07-29 RX ORDER — SODIUM CHLORIDE 0.9 % (FLUSH) 0.9 %
10 SYRINGE (ML) INJECTION
OUTPATIENT
Start: 2025-07-29

## 2025-07-29 RX ORDER — HEPARIN 100 UNIT/ML
500 SYRINGE INTRAVENOUS
OUTPATIENT
Start: 2025-07-29

## 2025-07-29 RX ADMIN — SODIUM FERRIC GLUCONATE COMPLEX IN SUCROSE 125 MG: 12.5 INJECTION INTRAVENOUS at 01:07

## 2025-07-29 RX ADMIN — Medication 10 ML: at 01:07

## 2025-07-29 NOTE — PLAN OF CARE
Problem: Anemia  Goal: Anemia Symptom Improvement  7/29/2025 1316 by Olivia Grigsby, RN  Outcome: Progressing  7/29/2025 1315 by Olivia Grigsby, RN  Outcome: Progressing

## 2025-07-29 NOTE — NURSING
Pt received IV Ferrlecit infusion dose 4/4. Pt tolerated it well. AVS given. Next appointment scheduled/ Pt will follow up with MD. Discharged with no acute distress.